# Patient Record
Sex: MALE | Race: WHITE | HISPANIC OR LATINO | Employment: OTHER | URBAN - METROPOLITAN AREA
[De-identification: names, ages, dates, MRNs, and addresses within clinical notes are randomized per-mention and may not be internally consistent; named-entity substitution may affect disease eponyms.]

---

## 2020-09-17 ENCOUNTER — HOSPITAL ENCOUNTER (INPATIENT)
Facility: HOSPITAL | Age: 55
LOS: 1 days | Discharge: HOME | DRG: 392 | End: 2020-09-19
Attending: EMERGENCY MEDICINE | Admitting: INTERNAL MEDICINE
Payer: MEDICARE

## 2020-09-17 ENCOUNTER — APPOINTMENT (EMERGENCY)
Dept: CT IMAGING | Facility: HOSPITAL | Age: 55
DRG: 392 | End: 2020-09-17
Payer: MEDICARE

## 2020-09-17 DIAGNOSIS — R10.9 ABDOMINAL PAIN: ICD-10-CM

## 2020-09-17 DIAGNOSIS — E86.0 DEHYDRATION: ICD-10-CM

## 2020-09-17 DIAGNOSIS — F12.90 MARIJUANA USE: ICD-10-CM

## 2020-09-17 DIAGNOSIS — R11.2 INTRACTABLE NAUSEA AND VOMITING: Primary | ICD-10-CM

## 2020-09-17 DIAGNOSIS — R10.13 EPIGASTRIC PAIN: ICD-10-CM

## 2020-09-17 DIAGNOSIS — R77.8 ELEVATED TROPONIN: ICD-10-CM

## 2020-09-17 LAB
ALBUMIN SERPL BCP-MCNC: 4.3 G/DL (ref 3.5–5)
ALP SERPL-CCNC: 68 U/L (ref 46–116)
ALT SERPL W P-5'-P-CCNC: 21 U/L (ref 12–78)
ANION GAP SERPL CALCULATED.3IONS-SCNC: 15 MMOL/L (ref 4–13)
APTT PPP: 27 SECONDS (ref 23–37)
AST SERPL W P-5'-P-CCNC: 25 U/L (ref 5–45)
BASE EX.OXY STD BLDV CALC-SCNC: 95.2 % (ref 60–80)
BASE EXCESS BLDV CALC-SCNC: 4.5 MMOL/L
BASOPHILS # BLD AUTO: 0.02 THOUSANDS/ΜL (ref 0–0.1)
BASOPHILS NFR BLD AUTO: 0 % (ref 0–1)
BETA-HYDROXYBUTYRATE: 1.6 MMOL/L
BILIRUB SERPL-MCNC: 0.52 MG/DL (ref 0.2–1)
BUN SERPL-MCNC: 10 MG/DL (ref 5–25)
CALCIUM SERPL-MCNC: 9.4 MG/DL (ref 8.3–10.1)
CHLORIDE SERPL-SCNC: 100 MMOL/L (ref 100–108)
CO2 SERPL-SCNC: 24 MMOL/L (ref 21–32)
CREAT SERPL-MCNC: 0.94 MG/DL (ref 0.6–1.3)
EOSINOPHIL # BLD AUTO: 0.01 THOUSAND/ΜL (ref 0–0.61)
EOSINOPHIL NFR BLD AUTO: 0 % (ref 0–6)
ERYTHROCYTE [DISTWIDTH] IN BLOOD BY AUTOMATED COUNT: 12.2 % (ref 11.6–15.1)
GFR SERPL CREATININE-BSD FRML MDRD: 92 ML/MIN/1.73SQ M
GLUCOSE SERPL-MCNC: 128 MG/DL (ref 65–140)
GLUCOSE SERPL-MCNC: 141 MG/DL (ref 65–140)
HCO3 BLDV-SCNC: 25.4 MMOL/L (ref 24–30)
HCT VFR BLD AUTO: 42.9 % (ref 36.5–49.3)
HGB BLD-MCNC: 14.7 G/DL (ref 12–17)
IMM GRANULOCYTES # BLD AUTO: 0.02 THOUSAND/UL (ref 0–0.2)
IMM GRANULOCYTES NFR BLD AUTO: 0 % (ref 0–2)
INR PPP: 1.05 (ref 0.84–1.19)
LACTATE SERPL-SCNC: 1.9 MMOL/L (ref 0.5–2)
LIPASE SERPL-CCNC: 74 U/L (ref 73–393)
LYMPHOCYTES # BLD AUTO: 0.87 THOUSANDS/ΜL (ref 0.6–4.47)
LYMPHOCYTES NFR BLD AUTO: 11 % (ref 14–44)
MAGNESIUM SERPL-MCNC: 1.6 MG/DL (ref 1.6–2.6)
MCH RBC QN AUTO: 31.7 PG (ref 26.8–34.3)
MCHC RBC AUTO-ENTMCNC: 34.3 G/DL (ref 31.4–37.4)
MCV RBC AUTO: 93 FL (ref 82–98)
MONOCYTES # BLD AUTO: 0.44 THOUSAND/ΜL (ref 0.17–1.22)
MONOCYTES NFR BLD AUTO: 6 % (ref 4–12)
NEUTROPHILS # BLD AUTO: 6.55 THOUSANDS/ΜL (ref 1.85–7.62)
NEUTS SEG NFR BLD AUTO: 83 % (ref 43–75)
NRBC BLD AUTO-RTO: 0 /100 WBCS
NT-PROBNP SERPL-MCNC: 743 PG/ML
O2 CT BLDV-SCNC: 20.8 ML/DL
PCO2 BLDV: 28.2 MM HG (ref 42–50)
PH BLDV: 7.57 [PH] (ref 7.3–7.4)
PLATELET # BLD AUTO: 225 THOUSANDS/UL (ref 149–390)
PMV BLD AUTO: 10 FL (ref 8.9–12.7)
PO2 BLDV: 77.3 MM HG (ref 35–45)
POTASSIUM SERPL-SCNC: 3.5 MMOL/L (ref 3.5–5.3)
PROT SERPL-MCNC: 8.4 G/DL (ref 6.4–8.2)
PROTHROMBIN TIME: 13.8 SECONDS (ref 11.6–14.5)
RBC # BLD AUTO: 4.63 MILLION/UL (ref 3.88–5.62)
SODIUM SERPL-SCNC: 139 MMOL/L (ref 136–145)
TROPONIN I SERPL-MCNC: 0.05 NG/ML
WBC # BLD AUTO: 7.91 THOUSAND/UL (ref 4.31–10.16)

## 2020-09-17 PROCEDURE — 87040 BLOOD CULTURE FOR BACTERIA: CPT | Performed by: PHYSICIAN ASSISTANT

## 2020-09-17 PROCEDURE — G1004 CDSM NDSC: HCPCS

## 2020-09-17 PROCEDURE — 85610 PROTHROMBIN TIME: CPT | Performed by: PHYSICIAN ASSISTANT

## 2020-09-17 PROCEDURE — 96372 THER/PROPH/DIAG INJ SC/IM: CPT

## 2020-09-17 PROCEDURE — 83880 ASSAY OF NATRIURETIC PEPTIDE: CPT | Performed by: PHYSICIAN ASSISTANT

## 2020-09-17 PROCEDURE — 99285 EMERGENCY DEPT VISIT HI MDM: CPT

## 2020-09-17 PROCEDURE — 71275 CT ANGIOGRAPHY CHEST: CPT

## 2020-09-17 PROCEDURE — 74177 CT ABD & PELVIS W/CONTRAST: CPT

## 2020-09-17 PROCEDURE — 93005 ELECTROCARDIOGRAM TRACING: CPT

## 2020-09-17 PROCEDURE — 83735 ASSAY OF MAGNESIUM: CPT | Performed by: PHYSICIAN ASSISTANT

## 2020-09-17 PROCEDURE — 83605 ASSAY OF LACTIC ACID: CPT | Performed by: PHYSICIAN ASSISTANT

## 2020-09-17 PROCEDURE — 82948 REAGENT STRIP/BLOOD GLUCOSE: CPT

## 2020-09-17 PROCEDURE — 82010 KETONE BODYS QUAN: CPT | Performed by: PHYSICIAN ASSISTANT

## 2020-09-17 PROCEDURE — 82805 BLOOD GASES W/O2 SATURATION: CPT | Performed by: PHYSICIAN ASSISTANT

## 2020-09-17 PROCEDURE — 96361 HYDRATE IV INFUSION ADD-ON: CPT

## 2020-09-17 PROCEDURE — 96374 THER/PROPH/DIAG INJ IV PUSH: CPT

## 2020-09-17 PROCEDURE — 36415 COLL VENOUS BLD VENIPUNCTURE: CPT | Performed by: PHYSICIAN ASSISTANT

## 2020-09-17 PROCEDURE — 84484 ASSAY OF TROPONIN QUANT: CPT | Performed by: PHYSICIAN ASSISTANT

## 2020-09-17 PROCEDURE — 80053 COMPREHEN METABOLIC PANEL: CPT | Performed by: PHYSICIAN ASSISTANT

## 2020-09-17 PROCEDURE — 85730 THROMBOPLASTIN TIME PARTIAL: CPT | Performed by: PHYSICIAN ASSISTANT

## 2020-09-17 PROCEDURE — 85025 COMPLETE CBC W/AUTO DIFF WBC: CPT | Performed by: PHYSICIAN ASSISTANT

## 2020-09-17 PROCEDURE — C9113 INJ PANTOPRAZOLE SODIUM, VIA: HCPCS | Performed by: PHYSICIAN ASSISTANT

## 2020-09-17 PROCEDURE — 87147 CULTURE TYPE IMMUNOLOGIC: CPT | Performed by: PHYSICIAN ASSISTANT

## 2020-09-17 PROCEDURE — 96375 TX/PRO/DX INJ NEW DRUG ADDON: CPT

## 2020-09-17 PROCEDURE — 83690 ASSAY OF LIPASE: CPT | Performed by: PHYSICIAN ASSISTANT

## 2020-09-17 RX ORDER — PANTOPRAZOLE SODIUM 40 MG/1
40 INJECTION, POWDER, FOR SOLUTION INTRAVENOUS ONCE
Status: COMPLETED | OUTPATIENT
Start: 2020-09-17 | End: 2020-09-17

## 2020-09-17 RX ORDER — OLANZAPINE 10 MG/1
3.5 INJECTION, POWDER, LYOPHILIZED, FOR SOLUTION INTRAMUSCULAR ONCE
Status: COMPLETED | OUTPATIENT
Start: 2020-09-18 | End: 2020-09-17

## 2020-09-17 RX ORDER — ONDANSETRON 2 MG/ML
4 INJECTION INTRAMUSCULAR; INTRAVENOUS ONCE
Status: COMPLETED | OUTPATIENT
Start: 2020-09-17 | End: 2020-09-17

## 2020-09-17 RX ORDER — OLANZAPINE 10 MG/1
2.5 INJECTION, POWDER, LYOPHILIZED, FOR SOLUTION INTRAMUSCULAR ONCE
Status: DISCONTINUED | OUTPATIENT
Start: 2020-09-17 | End: 2020-09-17

## 2020-09-17 RX ORDER — SUCRALFATE 1 G/1
1 TABLET ORAL ONCE
Status: COMPLETED | OUTPATIENT
Start: 2020-09-17 | End: 2020-09-17

## 2020-09-17 RX ORDER — MORPHINE SULFATE 4 MG/ML
4 INJECTION, SOLUTION INTRAMUSCULAR; INTRAVENOUS ONCE
Status: COMPLETED | OUTPATIENT
Start: 2020-09-17 | End: 2020-09-17

## 2020-09-17 RX ADMIN — WATER 2.1 ML: 1 INJECTION INTRAMUSCULAR; INTRAVENOUS; SUBCUTANEOUS at 23:48

## 2020-09-17 RX ADMIN — PANTOPRAZOLE SODIUM 40 MG: 40 INJECTION, POWDER, FOR SOLUTION INTRAVENOUS at 21:29

## 2020-09-17 RX ADMIN — MORPHINE SULFATE 4 MG: 4 INJECTION INTRAVENOUS at 22:41

## 2020-09-17 RX ADMIN — OLANZAPINE 3.5 MG: 10 INJECTION, POWDER, FOR SOLUTION INTRAMUSCULAR at 23:48

## 2020-09-17 RX ADMIN — ONDANSETRON 4 MG: 2 INJECTION INTRAMUSCULAR; INTRAVENOUS at 21:29

## 2020-09-17 RX ADMIN — IOHEXOL 100 ML: 350 INJECTION, SOLUTION INTRAVENOUS at 23:23

## 2020-09-17 RX ADMIN — SODIUM CHLORIDE 1000 ML: 0.9 INJECTION, SOLUTION INTRAVENOUS at 21:27

## 2020-09-17 RX ADMIN — SUCRALFATE 1 G: 1 TABLET ORAL at 22:13

## 2020-09-17 RX ADMIN — SODIUM CHLORIDE 1000 ML: 0.9 INJECTION, SOLUTION INTRAVENOUS at 22:15

## 2020-09-18 PROBLEM — E10.9 TYPE 1 DIABETES MELLITUS (HCC): Status: ACTIVE | Noted: 2020-09-18

## 2020-09-18 PROBLEM — R10.13 EPIGASTRIC PAIN: Status: ACTIVE | Noted: 2020-09-18

## 2020-09-18 PROBLEM — R11.2 INTRACTABLE NAUSEA AND VOMITING: Status: ACTIVE | Noted: 2020-09-18

## 2020-09-18 LAB
ANION GAP SERPL CALCULATED.3IONS-SCNC: 11 MMOL/L (ref 4–13)
ATRIAL RATE: 50 BPM
BACTERIA UR QL AUTO: ABNORMAL /HPF
BILIRUB UR QL STRIP: NEGATIVE
BUN SERPL-MCNC: 7 MG/DL (ref 5–25)
CALCIUM SERPL-MCNC: 8.1 MG/DL (ref 8.3–10.1)
CHLORIDE SERPL-SCNC: 101 MMOL/L (ref 100–108)
CLARITY UR: CLEAR
CO2 SERPL-SCNC: 24 MMOL/L (ref 21–32)
COLOR UR: ABNORMAL
CREAT SERPL-MCNC: 0.8 MG/DL (ref 0.6–1.3)
ERYTHROCYTE [DISTWIDTH] IN BLOOD BY AUTOMATED COUNT: 12.1 % (ref 11.6–15.1)
GFR SERPL CREATININE-BSD FRML MDRD: 102 ML/MIN/1.73SQ M
GLUCOSE P FAST SERPL-MCNC: 150 MG/DL (ref 65–99)
GLUCOSE SERPL-MCNC: 127 MG/DL (ref 65–140)
GLUCOSE SERPL-MCNC: 150 MG/DL (ref 65–140)
GLUCOSE SERPL-MCNC: 185 MG/DL (ref 65–140)
GLUCOSE SERPL-MCNC: 206 MG/DL (ref 65–140)
GLUCOSE SERPL-MCNC: 93 MG/DL (ref 65–140)
GLUCOSE UR STRIP-MCNC: NEGATIVE MG/DL
HCT VFR BLD AUTO: 39.8 % (ref 36.5–49.3)
HGB BLD-MCNC: 13.3 G/DL (ref 12–17)
HGB UR QL STRIP.AUTO: ABNORMAL
KETONES UR STRIP-MCNC: ABNORMAL MG/DL
LEUKOCYTE ESTERASE UR QL STRIP: NEGATIVE
MCH RBC QN AUTO: 31.8 PG (ref 26.8–34.3)
MCHC RBC AUTO-ENTMCNC: 33.4 G/DL (ref 31.4–37.4)
MCV RBC AUTO: 95 FL (ref 82–98)
NITRITE UR QL STRIP: NEGATIVE
NON-SQ EPI CELLS URNS QL MICRO: ABNORMAL /HPF
P AXIS: 28 DEGREES
PH UR STRIP.AUTO: 7.5 [PH]
PLATELET # BLD AUTO: 183 THOUSANDS/UL (ref 149–390)
PMV BLD AUTO: 10.1 FL (ref 8.9–12.7)
POTASSIUM SERPL-SCNC: 3.9 MMOL/L (ref 3.5–5.3)
PR INTERVAL: 144 MS
PROT UR STRIP-MCNC: NEGATIVE MG/DL
QRS AXIS: -60 DEGREES
QRSD INTERVAL: 104 MS
QT INTERVAL: 460 MS
QTC INTERVAL: 419 MS
RBC # BLD AUTO: 4.18 MILLION/UL (ref 3.88–5.62)
RBC #/AREA URNS AUTO: ABNORMAL /HPF
SODIUM SERPL-SCNC: 136 MMOL/L (ref 136–145)
SP GR UR STRIP.AUTO: 1.01 (ref 1–1.03)
T WAVE AXIS: -28 DEGREES
TROPONIN I SERPL-MCNC: 0.04 NG/ML
TROPONIN I SERPL-MCNC: 0.04 NG/ML
UROBILINOGEN UR QL STRIP.AUTO: 0.2 E.U./DL
VENTRICULAR RATE: 50 BPM
WBC # BLD AUTO: 7.12 THOUSAND/UL (ref 4.31–10.16)
WBC #/AREA URNS AUTO: ABNORMAL /HPF

## 2020-09-18 PROCEDURE — 84484 ASSAY OF TROPONIN QUANT: CPT | Performed by: NURSE PRACTITIONER

## 2020-09-18 PROCEDURE — 84484 ASSAY OF TROPONIN QUANT: CPT | Performed by: PHYSICIAN ASSISTANT

## 2020-09-18 PROCEDURE — 93010 ELECTROCARDIOGRAM REPORT: CPT | Performed by: INTERNAL MEDICINE

## 2020-09-18 PROCEDURE — 85027 COMPLETE CBC AUTOMATED: CPT | Performed by: NURSE PRACTITIONER

## 2020-09-18 PROCEDURE — 81001 URINALYSIS AUTO W/SCOPE: CPT | Performed by: PHYSICIAN ASSISTANT

## 2020-09-18 PROCEDURE — 80048 BASIC METABOLIC PNL TOTAL CA: CPT | Performed by: NURSE PRACTITIONER

## 2020-09-18 PROCEDURE — 36415 COLL VENOUS BLD VENIPUNCTURE: CPT | Performed by: PHYSICIAN ASSISTANT

## 2020-09-18 PROCEDURE — 82948 REAGENT STRIP/BLOOD GLUCOSE: CPT

## 2020-09-18 PROCEDURE — 99219 PR INITIAL OBSERVATION CARE/DAY 50 MINUTES: CPT | Performed by: INTERNAL MEDICINE

## 2020-09-18 RX ORDER — SUCRALFATE 1 G/1
1 TABLET ORAL
Status: DISCONTINUED | OUTPATIENT
Start: 2020-09-18 | End: 2020-09-19 | Stop reason: HOSPADM

## 2020-09-18 RX ORDER — ONDANSETRON 2 MG/ML
4 INJECTION INTRAMUSCULAR; INTRAVENOUS EVERY 6 HOURS PRN
Status: DISCONTINUED | OUTPATIENT
Start: 2020-09-18 | End: 2020-09-19 | Stop reason: HOSPADM

## 2020-09-18 RX ORDER — MAGNESIUM HYDROXIDE/ALUMINUM HYDROXICE/SIMETHICONE 120; 1200; 1200 MG/30ML; MG/30ML; MG/30ML
30 SUSPENSION ORAL EVERY 6 HOURS PRN
Status: DISCONTINUED | OUTPATIENT
Start: 2020-09-18 | End: 2020-09-19 | Stop reason: HOSPADM

## 2020-09-18 RX ORDER — LIDOCAINE HYDROCHLORIDE 20 MG/ML
15 SOLUTION OROPHARYNGEAL ONCE
Status: COMPLETED | OUTPATIENT
Start: 2020-09-18 | End: 2020-09-18

## 2020-09-18 RX ORDER — SODIUM CHLORIDE 9 MG/ML
125 INJECTION, SOLUTION INTRAVENOUS CONTINUOUS
Status: DISCONTINUED | OUTPATIENT
Start: 2020-09-18 | End: 2020-09-19 | Stop reason: HOSPADM

## 2020-09-18 RX ORDER — SUCRALFATE ORAL 1 G/10ML
1000 SUSPENSION ORAL EVERY 6 HOURS SCHEDULED
Status: DISCONTINUED | OUTPATIENT
Start: 2020-09-18 | End: 2020-09-18 | Stop reason: CLARIF

## 2020-09-18 RX ORDER — MAGNESIUM HYDROXIDE/ALUMINUM HYDROXICE/SIMETHICONE 120; 1200; 1200 MG/30ML; MG/30ML; MG/30ML
30 SUSPENSION ORAL ONCE
Status: COMPLETED | OUTPATIENT
Start: 2020-09-18 | End: 2020-09-18

## 2020-09-18 RX ADMIN — SUCRALFATE 1 G: 1 TABLET ORAL at 21:31

## 2020-09-18 RX ADMIN — SODIUM CHLORIDE 125 ML/HR: 0.9 INJECTION, SOLUTION INTRAVENOUS at 03:01

## 2020-09-18 RX ADMIN — SODIUM CHLORIDE 125 ML/HR: 0.9 INJECTION, SOLUTION INTRAVENOUS at 11:11

## 2020-09-18 RX ADMIN — SUCRALFATE 1 G: 1 TABLET ORAL at 06:19

## 2020-09-18 RX ADMIN — SODIUM CHLORIDE 125 ML/HR: 0.9 INJECTION, SOLUTION INTRAVENOUS at 18:45

## 2020-09-18 RX ADMIN — SUCRALFATE 1 G: 1 TABLET ORAL at 17:10

## 2020-09-18 RX ADMIN — ALUMINUM HYDROXIDE, MAGNESIUM HYDROXIDE, AND SIMETHICONE 30 ML: 200; 200; 20 SUSPENSION ORAL at 00:26

## 2020-09-18 RX ADMIN — LIDOCAINE HYDROCHLORIDE 15 ML: 20 SOLUTION ORAL; TOPICAL at 00:26

## 2020-09-18 RX ADMIN — SUCRALFATE 1 G: 1 TABLET ORAL at 11:08

## 2020-09-18 NOTE — ASSESSMENT & PLAN NOTE
· Presentation: Patient reports intractable nausea and vomiting began around 1800 last night  Patient denies sick contacts or food intake of possible cause  · Suspect in the setting of marijuana use  · IV hydration  · Antiemetics  · Pain control  · Monitor electrolytes and replace

## 2020-09-18 NOTE — PLAN OF CARE
Problem: Potential for Falls  Goal: Patient will remain free of falls  Description: INTERVENTIONS:  - Assess patient frequently for physical needs  -  Identify cognitive and physical deficits and behaviors that affect risk of falls    -  [place] fall precautions as indicated by assessment   - Educate patient/family on patient safety including physical limitations  - Instruct patient to call for assistance with activity based on assessment  - Modify environment to reduce risk of injury  - Consider OT/PT consult to assist with strengthening/mobility  Outcome: Progressing

## 2020-09-18 NOTE — PLAN OF CARE
Problem: Potential for Falls  Goal: Patient will remain free of falls  Description: INTERVENTIONS:  - Assess patient frequently for physical needs  -  Identify cognitive and physical deficits and behaviors that affect risk of falls    -  [place] fall precautions as indicated by assessment   - Educate patient/family on patient safety including physical limitations  - Instruct patient to call for assistance with activity based on assessment  - Modify environment to reduce risk of injury  - Consider OT/PT consult to assist with strengthening/mobility  Outcome: Progressing

## 2020-09-18 NOTE — ASSESSMENT & PLAN NOTE
Lab Results   Component Value Date    HGBA1C 8 4 (H) 09/14/2020       Recent Labs     09/17/20 2105   POCGLU 128       Blood Sugar Average: Last 72 hrs:  (P) 128   · Patient has insulin pump on person  · Have patient fill out initial assessment of insulin pump form  · Hypoglycemia protocol  · QID accu-checks

## 2020-09-18 NOTE — H&P
[facility]    H&P- José Torres 1965, 50 y o  male MRN: [id number]    Unit/Bed#: S -01 Encounter: [id number]    Primary Care Provider: No primary care provider on file  Date and time admitted to hospital: 9/17/2020  8:37 PM        * Intractable nausea and vomiting  Assessment & Plan  · Presentation: Patient reports intractable nausea and vomiting began around 1800 last night  Patient denies sick contacts or food intake of possible cause  · Suspect in the setting of marijuana use  · IV hydration  · Antiemetics  · Pain control  · Monitor electrolytes and replace  Epigastric pain  Assessment & Plan  · ABG non acidotic  Not in DKA  · CT of abdomen pelvis:  "No evidence of pulmonary embolism  No significant lymphadenopathy within the chest, abdomen or pelvis  Mild urinary bladder wall thickening which is likely due to the underdistention however correlate with urinalysis    · Pain control  Type 1 diabetes mellitus [facility])  Assessment & Plan  Lab Results   Component Value Date    HGBA1C 8 4 (H) 09/14/2020       Recent Labs     09/17/20  2105   POCGLU 128       Blood Sugar Average: Last 72 hrs:  (P) 128   · Patient has insulin pump on person  · Have patient fill out initial assessment of insulin pump form  · Hypoglycemia protocol  · QID accu-checks  VTE Prophylaxis: low risk  / reason for no mechanical VTE prophylaxis low risk   Code Status: Full  POLST: POLST form is not discussed and not completed at this time  Anticipated Length of Stay:  Patient will be admitted on an Observation basis with an anticipated length of stay of  < 2 midnights  Justification for Hospital Stay: Pain control, antiemetics, IV hydration    Total Time for Visit, including Counseling / Coordination of Care: 45 minutes  Greater than 50% of this total time spent on direct patient counseling and coordination of care  Chief Complaint:   Nausea, vomiting, and epigastric pain      History of Present Illness:    Donna Rg is a 50 y o  male with a past medical history of non Hodgkin's lymphoma and type 1 diabetes on a insulin pump who presents with nausea, vomiting and epigastric pain  Patient reports intractable nausea and vomiting began around 1800 last night  Patient denies sick contacts or food intake as possible cause  The patient was unable to tolerate oral intake in the ER  Patient will be admitted for IV hydration, antiemetics, and pain control  Review of Systems:    Review of Systems   Constitutional: Positive for chills  Negative for fever  Respiratory: Negative for cough and shortness of breath  Cardiovascular: Negative for chest pain  Gastrointestinal: Positive for abdominal pain, nausea and vomiting  Neurological: Negative for dizziness, light-headedness and numbness  All other systems reviewed and are negative  Past Medical and Surgical History:     History reviewed  No pertinent past medical history  History reviewed  No pertinent surgical history  Meds/Allergies:    Prior to Admission medications    Not on File     I have reviewed home medications with patient personally  Allergies:    Allergies   Allergen Reactions    Shellfish-Derived Products Hives       Social History:     Marital Status:    Occupation:  Unknown  Patient Pre-hospital Living Situation:  Private residence  Patient Pre-hospital Level of Mobility:  Independent  Patient Pre-hospital Diet Restrictions:  1st diabetic  Substance Use History:   Social History     Substance and Sexual Activity   Alcohol Use None     Social History     Tobacco Use   Smoking Status Never Smoker   Smokeless Tobacco Never Used     Social History     Substance and Sexual Activity   Drug Use Yes    Types: Marijuana    Comment: last use 2 days ago       Family History:    non-contributory    Physical Exam:     Vitals:   Blood Pressure: 159/68 (09/18/20 0110)  Pulse: (!) 49 (09/18/20 0110)  Temperature: 98 3 °F (36 8 °C) (09/18/20 0110)  Temp Source: Oral (09/18/20 0110)  Respirations: 16 (09/18/20 0110)  Height: 5' 6" (167 6 cm) (09/18/20 0030)  Weight - Scale: 72 6 kg (160 lb 0 9 oz) (09/18/20 0030)  SpO2: 95 % (09/18/20 0110)    Physical Exam  Vitals signs and nursing note reviewed  Constitutional:       General: He is in acute distress  Appearance: Normal appearance  HENT:      Head: Normocephalic and atraumatic  Eyes:      General: No scleral icterus  Conjunctiva/sclera: Conjunctivae normal    Cardiovascular:      Rate and Rhythm: Normal rate and regular rhythm  Pulses: Normal pulses  Heart sounds: Normal heart sounds  No murmur  Pulmonary:      Effort: Pulmonary effort is normal  No respiratory distress  Breath sounds: Normal breath sounds  No wheezing, rhonchi or rales  Abdominal:      General: Bowel sounds are normal  There is no distension  Tenderness: There is abdominal tenderness in the epigastric area  Musculoskeletal:         General: No swelling or deformity  Skin:     General: Skin is dry  Capillary Refill: Capillary refill takes 2 to 3 seconds  Neurological:      Mental Status: He is alert and oriented to person, place, and time  Additional Data:     Lab Results: I have personally reviewed pertinent reports        Results from last 7 days   Lab Units 09/17/20 2109   WBC Thousand/uL 7 91   HEMOGLOBIN g/dL 14 7   HEMATOCRIT % 42 9   PLATELETS Thousands/uL 225   NEUTROS PCT % 83*   LYMPHS PCT % 11*   MONOS PCT % 6   EOS PCT % 0     Results from last 7 days   Lab Units 09/17/20 2109   SODIUM mmol/L 139   POTASSIUM mmol/L 3 5   CHLORIDE mmol/L 100   CO2 mmol/L 24   BUN mg/dL 10   CREATININE mg/dL 0 94   ANION GAP mmol/L 15*   CALCIUM mg/dL 9 4   ALBUMIN g/dL 4 3   TOTAL BILIRUBIN mg/dL 0 52   ALK PHOS U/L 68   ALT U/L 21   AST U/L 25   GLUCOSE RANDOM mg/dL 141*     Results from last 7 days   Lab Units 09/17/20 2108   INR 1 05     Results from last 7 days   Lab Units 09/17/20  2105   POC GLUCOSE mg/dl 128     Results from last 7 days   Lab Units 09/14/20  1248   HEMOGLOBIN A1C % 8 4*     Results from last 7 days   Lab Units 09/17/20  2108   LACTIC ACID mmol/L 1 9       Imaging: I have personally reviewed pertinent reports  CT pe study w abdomen pelvis w contrast   Final Result by Cierra Chirinos MD (09/17 2328)      1  No evidence of pulmonary embolism  2   No significant lymphadenopathy within the chest, abdomen, or pelvis  3   Mild urinary bladder wall thickening which is likely due to underdistention however correlate with urinalysis  Workstation performed: [id number]             EKG, Pathology, and Other Studies Reviewed on Admission:   · EKG: None    Allscripts / Epic Records Reviewed: Yes     ** Please Note: This note has been constructed using a voice recognition system   **

## 2020-09-18 NOTE — UTILIZATION REVIEW
Initial Clinical Review    Admitted OBS status on  9/18  @  0022      CHANGED to INPT status on   9/18  @  1657  Due to need for continued hospital services     Inpatient Admission  Once      Transfer Service: Hospitalist       Question  Answer    Admitting Physician  YESI Javier    Level of Care  Med Surg    Estimated length of stay  More than 2 Midnights    Certification  I certify that inpatient services are medically necessary for this patient for a duration of greater than two midnights  See H&P and MD Progress Notes for additional information about the patient's course of treatment  ED Arrival Information     Expected Arrival Acuity Means of Arrival Escorted By Service Admission Type    - 9/17/2020 20:29 Urgent Walk-In Self Hospitalist Urgent    Arrival Complaint    VOMITING 2XDAY,WEAKNESS        Chief Complaint   Patient presents with    Epigastric Pain     Pt reports epigastric pain that shoots to his back and is burning  Reports Nausea and vomiting x2days   Vomiting     Assessment/Plan:       50 y o  male with a past medical history of non Hodgkin's lymphoma and type 1 diabetes on a insulin pump who presents with nausea, vomiting and epigastric pain  Patient reports intractable nausea and vomiting began around 1800 last night  Patient denies sick contacts or food intake as possible cause  The patient was unable to tolerate oral intake in the ER  Patient will be admitted for IV hydration, antiemetics, and pain control  * Intractable nausea and vomiting  Assessment & Plan  · Presentation: Patient reports intractable nausea and vomiting began around 1800 last night  Patient denies sick contacts or food intake of possible cause  · Suspect in the setting of marijuana use  · IV hydration  · Antiemetics  · Pain control  · Monitor electrolytes and replace  Epigastric pain  Assessment & Plan  · ABG non acidotic  Not in DKA    · CT of abdomen pelvis:  "No evidence of pulmonary embolism  No significant lymphadenopathy within the chest, abdomen or pelvis  Mild urinary bladder wall thickening which is likely due to the underdistention however correlate with urinalysis    · Pain control    Type 1 diabetes mellitus [facility])  Assessment & Plan        Lab Results   Component Value Date     HGBA1C 8 4 (H) 09/14/2020         Recent Labs     09/17/20  2105   POCGLU 128         Blood Sugar Average: Last 72 hrs:  (P) 128   · Patient has insulin pump on person  · Have patient fill out initial assessment of insulin pump form  · Hypoglycemia protocol  · QID accu-checks     Justification for Hospital Stay: Pain control, antiemetics, IV hydration          9/18  @  1635   IVF cont @  125 cc/hr - minimal po intake         ED Triage Vitals   Temperature Pulse Respirations Blood Pressure SpO2   09/17/20 2045 09/17/20 2045 09/17/20 2045 09/17/20 2045 09/17/20 2045   98 1 °F (36 7 °C) (!) 49 18 (!) 171/74 100 %      Temp Source Heart Rate Source Patient Position - Orthostatic VS BP Location FiO2 (%)   09/17/20 2045 09/17/20 2045 09/17/20 2242 09/17/20 2045 --   Oral Monitor Lying Right arm       Pain Score       09/17/20 2241       Worst Possible Pain          Wt Readings from Last 1 Encounters:   09/18/20 72 6 kg (160 lb 0 9 oz)     Additional Vital Signs:   09/18/20 0030   98 5 °F (36 9 °C)   54Abnormal     18   119/62      99 %   None (Room air)   Sitting    09/17/20 2242      53Abnormal     18   122/58      100 %          09/18/20 0657   98 8 °F (37 1 °C)   53Abnormal        111/56   81   96 %           09/16 0701   09/17 0700  09/17 0701   09/18 0700  09/18 0701   09/19 0700    I V  (mL/kg)    1020 8 (14 1)    IV Piggyback   2000     Total Intake(mL/kg)   2000 (27 5)  1020 8 (14 1)    Urine (mL/kg/hr)   250     Total Output   250     Net   +1750  +1020 8          Pertinent Labs/Diagnostic Test Results:   9/17  ekg -  Sinus bradycardia    9/17  ctap -   1   No evidence of pulmonary embolism      2   No significant lymphadenopathy within the chest, abdomen, or pelvis  3   Mild urinary bladder wall thickening which is likely due to underdistention however correlate with urinalysis         Results from last 7 days   Lab Units 09/18/20  0431 09/17/20 2109   WBC Thousand/uL 7 12 7 91   HEMOGLOBIN g/dL 13 3 14 7   HEMATOCRIT % 39 8 42 9   PLATELETS Thousands/uL 183 225   NEUTROS ABS Thousands/µL  --  6 55         Results from last 7 days   Lab Units 09/18/20  0431 09/17/20  2109   SODIUM mmol/L 136 139   POTASSIUM mmol/L 3 9 3 5   CHLORIDE mmol/L 101 100   CO2 mmol/L 24 24   ANION GAP mmol/L 11 15*   BUN mg/dL 7 10   CREATININE mg/dL 0 80 0 94   EGFR ml/min/1 73sq m 102 92   CALCIUM mg/dL 8 1* 9 4   MAGNESIUM mg/dL  --  1 6     Results from last 7 days   Lab Units 09/17/20 2109   AST U/L 25   ALT U/L 21   ALK PHOS U/L 68   TOTAL PROTEIN g/dL 8 4*   ALBUMIN g/dL 4 3   TOTAL BILIRUBIN mg/dL 0 52     Results from last 7 days   Lab Units 09/18/20  0708 09/17/20  2105   POC GLUCOSE mg/dl 127 128     Results from last 7 days   Lab Units 09/18/20 0431 09/17/20 2109   GLUCOSE RANDOM mg/dL 150* 141*         Results from last 7 days   Lab Units 09/14/20  1248   HEMOGLOBIN A1C % 8 4*   EAG mg/dL 194*     BETA-HYDROXYBUTYRATE   Date Value Ref Range Status   09/17/2020 1 6 (H) <0 6 mmol/L Final          Results from last 7 days   Lab Units 09/17/20 2108   PH LYRIC  7 573*   PCO2 LYRIC mm Hg 28 2*   PO2 LYRIC mm Hg 77 3*   HCO3 LYRIC mmol/L 25 4   BASE EXC LYRIC mmol/L 4 5   O2 CONTENT LYRIC ml/dL 20 8   O2 HGB, VENOUS % 95 2*             Results from last 7 days   Lab Units 09/18/20  0431 09/18/20  0035 09/17/20 2132   TROPONIN I ng/mL 0 04 0 04 0 05*         Results from last 7 days   Lab Units 09/17/20 2108   PROTIME seconds 13 8   INR  1 05   PTT seconds 27             Results from last 7 days   Lab Units 09/17/20  2108   LACTIC ACID mmol/L 1 9             Results from last 7 days   Lab Units 09/17/20  2109   NT-PRO BNP pg/mL 743*             Results from last 7 days   Lab Units 09/17/20  2109   LIPASE u/L 74             Results from last 7 days   Lab Units 09/18/20  0019   CLARITY UA  Clear   COLOR UA  Light Yellow   SPEC GRAV UA  1 010   PH UA  7 5   GLUCOSE UA mg/dl Negative   KETONES UA mg/dl 15 (1+)*   BLOOD UA  Trace-Intact*   PROTEIN UA mg/dl Negative   NITRITE UA  Negative   BILIRUBIN UA  Negative   UROBILINOGEN UA E U /dl 0 2   LEUKOCYTES UA  Negative   WBC UA /hpf None Seen   RBC UA /hpf 0-1*   BACTERIA UA /hpf None Seen   EPITHELIAL CELLS WET PREP /hpf None Seen     Results from last 7 days   Lab Units 09/17/20  2209 09/17/20 2132   BLOOD CULTURE  Received in Microbiology Lab  Culture in Progress  Received in Microbiology Lab  Culture in Progress       ED Treatment:   Medication Administration from 09/17/2020 2029 to 09/18/2020 0109       Date/Time Order Dose Route Action     09/17/2020 2129 ondansetron (ZOFRAN) injection 4 mg 4 mg Intravenous Given     09/17/2020 2127 sodium chloride 0 9 % bolus 1,000 mL 1,000 mL Intravenous New Bag     09/17/2020 2129 pantoprazole (PROTONIX) injection 40 mg 40 mg Intravenous Given     09/17/2020 2213 sucralfate (CARAFATE) tablet 1 g 1 g Oral Given     09/17/2020 2215 sodium chloride 0 9 % bolus 1,000 mL 1,000 mL Intravenous New Bag     09/17/2020 2241 morphine (PF) 4 mg/mL injection 4 mg 4 mg Intravenous Given     09/17/2020 2348 sterile water injection **ADS Override Pull** 2 1 mL  Given     09/17/2020 2348 OLANZapine (ZyPREXA) IM injection 3 5 mg 3 5 mg Intramuscular Given     09/18/2020 0026 aluminum-magnesium hydroxide-simethicone (MYLANTA) 200-200-20 mg/5 mL oral suspension 30 mL 30 mL Oral Given     09/18/2020 0026 Lidocaine Viscous HCl (XYLOCAINE) 2 % mucosal solution 15 mL 15 mL Swish & Swallow Given        Admitting Diagnosis: Dehydration [E86 0]  Vomiting [R11 10]  Epigastric pain [R10 13]  Abdominal pain [R10 9]  Elevated troponin [R79 89]  Intractable nausea and vomiting [R11 2]  Marijuana use [F12 90]  Age/Sex: 50 y o  male  Admission Orders:  accucks qid w/SSI:  Ambulate q shift;   Clear liquid diet;   IVF    Scheduled Medications:  sucralfate, 1 g, Oral, 4x Daily (AC & HS)      Continuous IV Infusions:  sodium chloride, 125 mL/hr, Intravenous, Continuous      PRN Meds:  aluminum-magnesium hydroxide-simethicone, 30 mL, Oral, Q6H PRN  insulin aspart, 1 mL, Subcutaneous Insulin Pump, Daily PRN  morphine injection, 2 mg, Intravenous, Q3H PRN  ondansetron, 4 mg, Intravenous, Q6H PRN        Network Utilization Review Department  [email or web address]  ATTENTION: Please call with any questions or concerns to [phone] and carefully listen to the prompts so that you are directed to the right person  All voicemails are confidential   Lars Shin all requests for admission clinical reviews, approved or denied determinations and any other requests to dedicated fax number below belonging to the campus where the patient is receiving treatment   List of dedicated fax numbers for the Facilities:  [address] [place] [address] DENIALS (Administrative/Medical Necessity) [phone]   [facility] [address] [place] [address] (Maternity/NICU/Pediatrics) [phone]   [facility] [phone]   [facility] [phone]   Darrius Patricia [phone]   Yoselin Pozo [phone]   [address] [facility] [address] [facility] [phone]   [facility] [phone]   [address] [facility] [address]  [address] [facility] [address] [facility] [phone]

## 2020-09-18 NOTE — ASSESSMENT & PLAN NOTE
· ABG non acidotic  Not in DKA  · CT of abdomen pelvis:  "No evidence of pulmonary embolism  No significant lymphadenopathy within the chest, abdomen or pelvis  Mild urinary bladder wall thickening which is likely due to the underdistention however correlate with urinalysis    · Pain control

## 2020-09-19 VITALS
SYSTOLIC BLOOD PRESSURE: 106 MMHG | DIASTOLIC BLOOD PRESSURE: 60 MMHG | RESPIRATION RATE: 16 BRPM | HEIGHT: 66 IN | BODY MASS INDEX: 25.72 KG/M2 | TEMPERATURE: 98.6 F | WEIGHT: 160.05 LBS | HEART RATE: 63 BPM | OXYGEN SATURATION: 95 %

## 2020-09-19 PROBLEM — R11.2 INTRACTABLE NAUSEA AND VOMITING: Status: RESOLVED | Noted: 2020-09-18 | Resolved: 2020-09-19

## 2020-09-19 PROBLEM — R10.13 EPIGASTRIC PAIN: Status: RESOLVED | Noted: 2020-09-18 | Resolved: 2020-09-19

## 2020-09-19 PROBLEM — F12.90 MARIJUANA USE: Status: ACTIVE | Noted: 2020-09-19

## 2020-09-19 PROBLEM — R78.81 POSITIVE BLOOD CULTURE: Status: ACTIVE | Noted: 2020-09-19

## 2020-09-19 PROBLEM — R10.9 ABDOMINAL PAIN: Status: ACTIVE | Noted: 2020-09-19

## 2020-09-19 LAB
ANION GAP SERPL CALCULATED.3IONS-SCNC: 4 MMOL/L (ref 4–13)
BASOPHILS # BLD AUTO: 0.02 THOUSANDS/ΜL (ref 0–0.1)
BASOPHILS NFR BLD AUTO: 0 % (ref 0–1)
BUN SERPL-MCNC: 9 MG/DL (ref 5–25)
CALCIUM SERPL-MCNC: 7.6 MG/DL (ref 8.3–10.1)
CHLORIDE SERPL-SCNC: 108 MMOL/L (ref 100–108)
CO2 SERPL-SCNC: 27 MMOL/L (ref 21–32)
CREAT SERPL-MCNC: 0.87 MG/DL (ref 0.6–1.3)
EOSINOPHIL # BLD AUTO: 0.04 THOUSAND/ΜL (ref 0–0.61)
EOSINOPHIL NFR BLD AUTO: 1 % (ref 0–6)
ERYTHROCYTE [DISTWIDTH] IN BLOOD BY AUTOMATED COUNT: 12.2 % (ref 11.6–15.1)
GFR SERPL CREATININE-BSD FRML MDRD: 99 ML/MIN/1.73SQ M
GLUCOSE SERPL-MCNC: 131 MG/DL (ref 65–140)
GLUCOSE SERPL-MCNC: 141 MG/DL (ref 65–140)
GLUCOSE SERPL-MCNC: 97 MG/DL (ref 65–140)
HCT VFR BLD AUTO: 37.5 % (ref 36.5–49.3)
HGB BLD-MCNC: 12.4 G/DL (ref 12–17)
IMM GRANULOCYTES # BLD AUTO: 0.01 THOUSAND/UL (ref 0–0.2)
IMM GRANULOCYTES NFR BLD AUTO: 0 % (ref 0–2)
LYMPHOCYTES # BLD AUTO: 2.06 THOUSANDS/ΜL (ref 0.6–4.47)
LYMPHOCYTES NFR BLD AUTO: 35 % (ref 14–44)
MCH RBC QN AUTO: 31.6 PG (ref 26.8–34.3)
MCHC RBC AUTO-ENTMCNC: 33.1 G/DL (ref 31.4–37.4)
MCV RBC AUTO: 96 FL (ref 82–98)
MONOCYTES # BLD AUTO: 0.43 THOUSAND/ΜL (ref 0.17–1.22)
MONOCYTES NFR BLD AUTO: 7 % (ref 4–12)
NEUTROPHILS # BLD AUTO: 3.3 THOUSANDS/ΜL (ref 1.85–7.62)
NEUTS SEG NFR BLD AUTO: 57 % (ref 43–75)
NRBC BLD AUTO-RTO: 0 /100 WBCS
PLATELET # BLD AUTO: 175 THOUSANDS/UL (ref 149–390)
PMV BLD AUTO: 9.9 FL (ref 8.9–12.7)
POTASSIUM SERPL-SCNC: 3.9 MMOL/L (ref 3.5–5.3)
RBC # BLD AUTO: 3.92 MILLION/UL (ref 3.88–5.62)
SODIUM SERPL-SCNC: 139 MMOL/L (ref 136–145)
WBC # BLD AUTO: 5.86 THOUSAND/UL (ref 4.31–10.16)

## 2020-09-19 PROCEDURE — 82948 REAGENT STRIP/BLOOD GLUCOSE: CPT

## 2020-09-19 PROCEDURE — 80048 BASIC METABOLIC PNL TOTAL CA: CPT | Performed by: INTERNAL MEDICINE

## 2020-09-19 PROCEDURE — 99285 EMERGENCY DEPT VISIT HI MDM: CPT | Performed by: PHYSICIAN ASSISTANT

## 2020-09-19 PROCEDURE — 99238 HOSP IP/OBS DSCHRG MGMT 30/<: CPT | Performed by: INTERNAL MEDICINE

## 2020-09-19 PROCEDURE — 85025 COMPLETE CBC W/AUTO DIFF WBC: CPT | Performed by: INTERNAL MEDICINE

## 2020-09-19 RX ORDER — ONDANSETRON 4 MG/1
4 TABLET, ORALLY DISINTEGRATING ORAL EVERY 8 HOURS PRN
Qty: 15 TABLET | Refills: 0 | Status: SHIPPED | OUTPATIENT
Start: 2020-09-19 | End: 2021-05-06 | Stop reason: HOSPADM

## 2020-09-19 RX ORDER — SIMETHICONE 180 MG
180 CAPSULE ORAL EVERY 12 HOURS PRN
Qty: 10 CAPSULE | Refills: 0 | Status: SHIPPED | OUTPATIENT
Start: 2020-09-19 | End: 2020-09-24

## 2020-09-19 RX ORDER — PANTOPRAZOLE SODIUM 20 MG/1
20 TABLET, DELAYED RELEASE ORAL DAILY
Qty: 30 TABLET | Refills: 0 | Status: SHIPPED | OUTPATIENT
Start: 2020-09-19 | End: 2021-05-06 | Stop reason: HOSPADM

## 2020-09-19 RX ADMIN — SUCRALFATE 1 G: 1 TABLET ORAL at 05:25

## 2020-09-19 RX ADMIN — SODIUM CHLORIDE 125 ML/HR: 0.9 INJECTION, SOLUTION INTRAVENOUS at 02:07

## 2020-09-19 NOTE — DISCHARGE SUMMARY
Discharge- You Formosa 1965, 50 y o  male MRN: [id number]    Unit/Bed#: S -01 Encounter: [id number]    Primary Care Provider: No primary care provider on file  Date and time admitted to hospital: 9/17/2020  8:37 PM        * Intractable nausea and vomiting  Assessment & Plan  Improved with IV hydration, antiemetics and marijuana abstinence  He is ok to be discharged home and follow up with a primary care doctor and GI of his preference  Abdominal pain  Assessment & Plan  He did have dyspeptic symptoms with nausea, vomiting and flatulence  They have significantly improved as above  May benefit form follow up but since he is stable this can be done as an outpatient  Marijuana use  Assessment & Plan  This can potentially be contributing to his nausea symptoms of admission  He was oriented to stop  Type 1 diabetes mellitus (HCC)  Assessment & Plan    Stable, patient controls his own insulin pump  No signs of acidosis  Positive blood culture  Assessment & Plan  He did have 1 out of 2 blood cultures collected on 09/17/2020 positive for gram-positive cocci in clusters and Gram-negative rods, the other blood culture however has remained negative to date  Patient is afebrile, nontoxic, there is no white count, there is no heart murmur  The positive culture seems to be a contaminant at this point given his clinical picture  He is aware of the results and that he should monitor himself closely for any signs of sepsis, but still requests discharge  Discharging Physician / Practitioner: Nitin Perea MD  PCP: No primary care provider on file    Admission Date:   Admission Orders (From admission, onward)     Ordered        09/18/20 1657  Inpatient Admission  Once         09/18/20 0022  Place in Observation (expected length of stay for this patient is less than two midnights)  Once                   Discharge Date: 09/19/20    Resolved Problems  Date Reviewed: 9/18/2020 Resolved    Epigastric pain 9/19/2020     Resolved by  Rg Brizuela MD              Significant Findings / Test Results:   · 1 out of 2 blood cultures positive for gram-positive cocci in clusters and Gram-negative rods, the other blood culture however has remained negative to date, however patient has no signs of sepsis, no fever, no murmur, nontoxic, no white count  Most likely a contaminant given the clinical picture  Patient wishes to be discharged regardless of the results  Test Results Pending at Discharge (will require follow up): · Final blood culture results  Reason for Admission: Intractable nausea and vomiting  HPI: Kingsley Morgan is a 50 y o  male with a past medical history of non Hodgkin's lymphoma and type 1 diabetes on a insulin pump who presents with nausea, vomiting and epigastric pain  Patient reports intractable nausea and vomiting began around 1800 last night  Patient denies sick contacts or food intake as possible cause  The patient was unable to tolerate oral intake in the ER  Patient will be admitted for IV hydration, antiemetics, and pain control  Hospital Course:     Patient was treated symptomatically with IV metoclopramide and IV hydration  Marijuana cessation was achieved while the hospital   With that his symptoms improved significantly  He was able to tolerate diet without any significant nausea vomiting  Of note, patient remained afebrile throughout the hospitalization  1 out of 2 blood cultures collected during admission workup came back positive for gram-positive cocci in clusters and Gram-negative rods, the other blood culture however has remained negative to date  Patient is afebrile, nontoxic, there is no white count, there is no heart murmur  Since there was no other sign of active infection no antibiotic treatment was pursued and the culture was deemed a contaminant due to its polymicrobial nature    He is aware of the results and that he should monitor himself closely for any signs of sepsis  On 09/19/2020 in the morning he feels much better, able to tolerate diet  Nontoxic, exam normal  He asked if he could go home  As per request he is being discharged home but oriented to come back to the hospital if any signs of sepsis appear or if he deteriorates  He is supposed to follow up with primary care physician and gastroenterologist as an outpatient  PRN medicines pantoprazole, simethicone and zofran have been provided regarding his initial abdominal discomfort  He is recommended to abstain from marijuana  Cassandra Servin is a 50 y o  male patient who originally presented to the hospital on 9/17/2020 due to nausea and vomiting    The patient, initially admitted to the hospital as inpatient, was discharged earlier than expected given the following: significant clinical improvement       Please see above list of diagnoses and related plan for additional information  Condition at Discharge: good     Discharge Day Visit / Exam:     Subjective:  Patient feels much better, tolerating diet, no nausea, vomiting, diarrhea, constipation  Vitals: Blood Pressure: 106/60 (09/19/20 0727)  Pulse: 63 (09/19/20 0727)  Temperature: 98 6 °F (37 °C) (09/19/20 0727)  Temp Source: Oral (09/19/20 0727)  Respirations: 16 (09/19/20 0727)  Height: 5' 6" (167 6 cm) (09/18/20 0030)  Weight - Scale: 72 6 kg (160 lb 0 9 oz) (09/18/20 0030)  SpO2: 95 % (09/19/20 0727)  Exam:   Physical Exam  Constitutional:       General: He is not in acute distress  Appearance: Normal appearance  He is normal weight  He is not ill-appearing, toxic-appearing or diaphoretic  HENT:      Head: Normocephalic and atraumatic  Right Ear: External ear normal       Left Ear: External ear normal       Nose: Nose normal  No congestion or rhinorrhea  Mouth/Throat:      Mouth: Mucous membranes are moist       Pharynx: Oropharynx is clear   No oropharyngeal exudate or posterior oropharyngeal erythema  Eyes:      General: No scleral icterus  Right eye: No discharge  Left eye: No discharge  Extraocular Movements: Extraocular movements intact  Conjunctiva/sclera: Conjunctivae normal       Pupils: Pupils are equal, round, and reactive to light  Neck:      Musculoskeletal: Normal range of motion  No neck rigidity or muscular tenderness  Vascular: No carotid bruit  Cardiovascular:      Rate and Rhythm: Normal rate and regular rhythm  Pulses: Normal pulses  Heart sounds: Normal heart sounds  No murmur  No friction rub  No gallop  Pulmonary:      Effort: Pulmonary effort is normal  No respiratory distress  Breath sounds: Normal breath sounds  No stridor  No wheezing or rhonchi  Abdominal:      General: Abdomen is flat  Bowel sounds are normal  There is no distension  Palpations: Abdomen is soft  There is no mass  Tenderness: There is no abdominal tenderness  There is no right CVA tenderness, left CVA tenderness, guarding or rebound  Musculoskeletal: Normal range of motion  General: No swelling  Lymphadenopathy:      Cervical: No cervical adenopathy  Skin:     General: Skin is warm and dry  Capillary Refill: Capillary refill takes less than 2 seconds  Coloration: Skin is not jaundiced  Findings: No bruising or rash  Neurological:      General: No focal deficit present  Mental Status: He is alert and oriented to person, place, and time  Mental status is at baseline  Cranial Nerves: No cranial nerve deficit  Sensory: No sensory deficit  Motor: No weakness  Psychiatric:         Mood and Affect: Mood normal            Discharge instructions/Information to patient and family:   See after visit summary for information provided to patient and family  Provisions for Follow-Up Care:  See after visit summary for information related to follow-up care and any pertinent home health orders  Disposition:     Home    For Discharges to [facility] SNF:   · Not Applicable to this Patient - Not Applicable to this Patient    Planned Readmission: No     Discharge Statement:  I spent 30 minutes discharging the patient  This time was spent on the day of discharge  I had direct contact with the patient on the day of discharge  Greater than 50% of the total time was spent examining patient, answering all patient questions, arranging and discussing plan of care with patient as well as directly providing post-discharge instructions  Additional time then spent on discharge activities  Discharge Medications:  See after visit summary for reconciled discharge medications provided to patient and family        ** Please Note: This note has been constructed using a voice recognition system **

## 2020-09-19 NOTE — ASSESSMENT & PLAN NOTE
He did have dyspeptic symptoms with nausea, vomiting and flatulence  They have significantly improved as above  May benefit form follow up but since he is stable this can be done as an outpatient

## 2020-09-19 NOTE — ASSESSMENT & PLAN NOTE
Improved with IV hydration, antiemetics and marijuana abstinence  He is ok to be discharged home and follow up with a primary care doctor and GI of his preference

## 2020-09-19 NOTE — ASSESSMENT & PLAN NOTE
He did have 1 out of 2 blood cultures collected on 09/17/2020 positive for gram-positive cocci in clusters and Gram-negative rods, the other blood culture however has remained negative to date  Patient is afebrile, nontoxic, there is no white count, there is no heart murmur  The positive culture seems to be a contaminant at this point given his clinical picture  He is aware of the results and that he should monitor himself closely for any signs of sepsis, but still requests discharge

## 2020-09-19 NOTE — PLAN OF CARE
Problem: Potential for Falls  Goal: Patient will remain free of falls  Description: INTERVENTIONS:  - Assess patient frequently for physical needs  -  Identify cognitive and physical deficits and behaviors that affect risk of falls    -  [place] fall precautions as indicated by assessment   - Educate patient/family on patient safety including physical limitations  - Instruct patient to call for assistance with activity based on assessment  - Modify environment to reduce risk of injury  - Consider OT/PT consult to assist with strengthening/mobility  Outcome: Progressing     Problem: PAIN - ADULT  Goal: Verbalizes/displays adequate comfort level or baseline comfort level  Description: Interventions:  - Encourage patient to monitor pain and request assistance  - Assess pain using appropriate pain scale  - Administer analgesics based on type and severity of pain and evaluate response  - Implement non-pharmacological measures as appropriate and evaluate response  - Consider cultural and social influences on pain and pain management  - Notify physician/advanced practitioner if interventions unsuccessful or patient reports new pain  Outcome: Progressing     Problem: INFECTION - ADULT  Goal: Absence or prevention of progression during hospitalization  Description: INTERVENTIONS:  - Assess and monitor for signs and symptoms of infection  - Monitor lab/diagnostic results  - Monitor all insertion sites, i e  indwelling lines, tubes, and drains  - Monitor endotracheal if appropriate and nasal secretions for changes in amount and color  - [place] appropriate cooling/warming therapies per order  - Administer medications as ordered  - Instruct and encourage patient and family to use good hand hygiene technique  - Identify and instruct in appropriate isolation precautions for identified infection/condition  Outcome: Progressing  Goal: Absence of fever/infection during neutropenic period  Description: INTERVENTIONS:  - Monitor WBC    Outcome: Progressing     Problem: SAFETY ADULT  Goal: Maintain or return to baseline ADL function  Description: INTERVENTIONS:  -  Assess patient's ability to carry out ADLs; assess patient's baseline for ADL function and identify physical deficits which impact ability to perform ADLs (bathing, care of mouth/teeth, toileting, grooming, dressing, etc )  - Assess/evaluate cause of self-care deficits   - Assess range of motion  - Assess patient's mobility; develop plan if impaired  - Assess patient's need for assistive devices and provide as appropriate  - Encourage maximum independence but intervene and supervise when necessary  - Involve family in performance of ADLs  - Assess for home care needs following discharge   - Consider OT consult to assist with ADL evaluation and planning for discharge  - Provide patient education as appropriate  Outcome: Progressing  Goal: Maintain or return mobility status to optimal level  Description: INTERVENTIONS:  - Assess patient's baseline mobility status (ambulation, transfers, stairs, etc )    - Identify cognitive and physical deficits and behaviors that affect mobility  - Identify mobility aids required to assist with transfers and/or ambulation (gait belt, sit-to-stand, lift, walker, cane, etc )  - [place] fall precautions as indicated by assessment  - Record patient progress and toleration of activity level on Mobility SBAR; progress patient to next Phase/Stage  - Instruct patient to call for assistance with activity based on assessment  - Consider rehabilitation consult to assist with strengthening/weightbearing, etc   Outcome: Progressing     Problem: DISCHARGE PLANNING  Goal: Discharge to home or other facility with appropriate resources  Description: INTERVENTIONS:  - Identify barriers to discharge w/patient and caregiver  - Arrange for needed discharge resources and transportation as appropriate  - Identify discharge learning needs (meds, wound care, etc )  - Arrange for interpretive services to assist at discharge as needed  - Refer to Case Management Department for coordinating discharge planning if the patient needs post-hospital services based on physician/advanced practitioner order or complex needs related to functional status, cognitive ability, or social support system  Outcome: Progressing     Problem: Knowledge Deficit  Goal: Patient/family/caregiver demonstrates understanding of disease process, treatment plan, medications, and discharge instructions  Description: Complete learning assessment and assess knowledge base    Interventions:  - Provide teaching at level of understanding  - Provide teaching via preferred learning methods  Outcome: Progressing

## 2020-09-19 NOTE — DISCHARGE INSTR - AVS FIRST PAGE
Dear Michell Enrique,     It was our pleasure to care for you here at [facility]  It is our hope that we were always able to exceed the expected standards for your care during your stay  You were hospitalized due to intractable nausea and abdominal discomfort  You were cared for on the 4th floor under the service of Patricia Bermudez MD with the [facility]  If you have any questions or concerns related to this hospitalization, you may contact us at [phone]  For follow up as well as medication refills, we recommend that you follow up with your primary care physician  A registered nurse will reach out to you by phone within a few days after your discharge to answer any additional questions that you may have after going home  However, at this time we provide for you here, the most important instructions / recommendations at discharge:     · Notable Medication Adjustments -   · Take pantoprazole 20 mg tablet once a day  May also take Zofran 4 mg by mouth up to every 8 hours as needed for nausea  If flatulence persists may also take simethicone as needed  Scripts sent yo your pharmacy  · Avoid marijuana as it may worsen your nausea  · Important follow up information -   · Follow-up with her primary care physician and a gastroenterologist because recent hospitalization  · Other Instructions -   · Continue to use your insulin pump as prescribed by your endocrinologist  Have a consistent carbohydrate intake  · Please review this entire after visit summary as additional general instructions including medication list, appointments, activity, diet, any pertinent wound care, and other additional recommendations from your care team that may be provided for you        Sincerely,     Patricia Bermudez MD

## 2020-09-21 LAB
BACTERIA BLD CULT: ABNORMAL
BACTERIA BLD CULT: ABNORMAL
GRAM STN SPEC: ABNORMAL
GRAM STN SPEC: ABNORMAL

## 2020-09-22 NOTE — ED PROVIDER NOTES
EMERGENCY MEDICINE NOTE        PATIENT IDENTIFICATION PHYSICIAN/SERVICE INFORMATION   Name: Lidia Sharif  MRN: [id number]  YOB: 1965  Age/Sex: 50 y o  male  Preferred Language: English  Code Status: Prior  Encounter Date: 9/17/2020  Attending Physician: Jazz Souza DO  Admitting Physician: Kristopher Perea MD   Primary Care Physician: No primary care provider on file  Primary Care Phone: None       CHIEF COMPLAINT     Chief Complaint   Patient presents with    Epigastric Pain     Pt reports epigastric pain that shoots to his back and is burning  Reports Nausea and vomiting x2days        Vomiting         HISTORY OF PRESENT ILLNESS       History provided by:  Patient   used: No    Epigastric Pain   Pain location:  Epigastric  Pain quality: aching and burning    Pain radiates to:  Upper back  Pain radiates to the back: yes    Pain severity:  Moderate  Onset quality:  Gradual  Timing:  Constant  Progression:  Waxing and waning  Chronicity:  New  Context: stress (pt admits to stressful argument with son, who recently was released from incarceration)    Context: not breathing, no drug use, not eating, no intercourse, not lifting, no movement, not raising an arm, not at rest and no trauma    Relieved by:  None tried  Worsened by:  Nothing tried  Ineffective treatments:  None tried  Associated symptoms: abdominal pain, anorexia, anxiety, heartburn, nausea and vomiting    Associated symptoms: no AICD problem, no altered mental status, no back pain, no claudication, no cough, no diaphoresis, no dizziness, no dysphagia, no fatigue, no fever, no headache, no lower extremity edema, no near-syncope, no numbness, no orthopnea, no palpitations, no PND, no shortness of breath, no syncope and no weakness    Risk factors: hypertension and male sex    Risk factors: no aortic disease, no coronary artery disease, no diabetes mellitus, no Emmanuel-Danlos syndrome, no high cholesterol, no immobilization, no Marfan's syndrome, not obese, no prior DVT/PE, no smoking and no surgery    Vomiting   Associated symptoms: abdominal pain    Associated symptoms: no arthralgias, no chills, no cough, no diarrhea, no fever, no headaches and no sore throat          PAST MEDICAL AND SURGICAL HISTORY     History reviewed  No pertinent past medical history  History reviewed  No pertinent surgical history  History reviewed  No pertinent family history  E-Cigarette/Vaping    E-Cigarette Use Never User      E-Cigarette/Vaping Substances    Nicotine No     THC Yes     CBD No      Social History     Tobacco Use    Smoking status: Never Smoker    Smokeless tobacco: Never Used   Substance Use Topics    Alcohol use: Not on file    Drug use: Yes     Types: Marijuana     Comment: last use 2 days ago         ALLERGIES     Allergies   Allergen Reactions    Shellfish-Derived Products Hives         HOME MEDICATIONS     None         REVIEW OF SYSTEMS     Review of Systems   Constitutional: Negative for activity change, appetite change, chills, diaphoresis, fatigue and fever  HENT: Negative for congestion, drooling, ear discharge, ear pain, facial swelling, postnasal drip, rhinorrhea, sinus pressure, sinus pain, sore throat, trouble swallowing and voice change  Eyes: Negative for discharge and visual disturbance  Respiratory: Negative for apnea, cough, choking, chest tightness, shortness of breath, wheezing and stridor  Cardiovascular: Negative for chest pain, palpitations, orthopnea, claudication, leg swelling, syncope, PND and near-syncope  Gastrointestinal: Positive for abdominal pain, anorexia, heartburn, nausea and vomiting  Negative for abdominal distention, anal bleeding, blood in stool, constipation, diarrhea and rectal pain  Endocrine: Negative for polydipsia, polyphagia and polyuria     Genitourinary: Negative for decreased urine volume, difficulty urinating, dysuria, flank pain, frequency, genital sores, hematuria and urgency  Musculoskeletal: Negative for arthralgias, back pain, joint swelling and neck pain  Skin: Negative for rash and wound  Neurological: Negative for dizziness, weakness, light-headedness, numbness and headaches  Hematological: Negative for adenopathy  Psychiatric/Behavioral: The patient is not nervous/anxious  All other systems reviewed and are negative  PHYSICAL EXAMINATION     ED Triage Vitals   Temperature Pulse Respirations Blood Pressure SpO2   09/17/20 2045 09/17/20 2045 09/17/20 2045 09/17/20 2045 09/17/20 2045   98 1 °F (36 7 °C) (!) 49 18 (!) 171/74 100 %      Temp Source Heart Rate Source Patient Position - Orthostatic VS BP Location FiO2 (%)   09/17/20 2045 09/17/20 2045 09/17/20 2242 09/17/20 2045 --   Oral Monitor Lying Right arm       Pain Score       09/17/20 2241       Worst Possible Pain         Wt Readings from Last 3 Encounters:   09/18/20 72 6 kg (160 lb 0 9 oz)   06/03/15 82 8 kg (182 lb 7 oz)   04/02/15 82 7 kg (182 lb 4 9 oz)         Physical Exam  Vitals signs and nursing note reviewed  Constitutional:       General: He is not in acute distress  Appearance: He is well-developed and normal weight  He is not ill-appearing, toxic-appearing or diaphoretic  HENT:      Head: Normocephalic and atraumatic  Mouth/Throat:      Mouth: Mucous membranes are moist    Eyes:      Conjunctiva/sclera: Conjunctivae normal       Pupils: Pupils are equal, round, and reactive to light  Neck:      Musculoskeletal: Normal range of motion and neck supple  Vascular: No carotid bruit, hepatojugular reflux or JVD  Cardiovascular:      Rate and Rhythm: Normal rate and regular rhythm  No extrasystoles are present  Chest Wall: PMI is not displaced  Pulses: Normal pulses  Radial pulses are 2+ on the right side and 2+ on the left side  Dorsalis pedis pulses are 2+ on the right side and 2+ on the left side          Posterior tibial pulses are 2+ on the right side and 2+ on the left side  Heart sounds: Normal heart sounds  No murmur  No friction rub  No gallop  Pulmonary:      Effort: Pulmonary effort is normal  No respiratory distress  Breath sounds: Normal breath sounds  No stridor  No wheezing or rales  Chest:      Chest wall: No tenderness  Abdominal:      General: Bowel sounds are normal  There is no distension  Palpations: Abdomen is soft  Abdomen is not rigid  There is no mass  Tenderness: There is abdominal tenderness (epigastric, ruq)  There is no right CVA tenderness, left CVA tenderness, guarding or rebound  Negative signs include Kay's sign and McBurney's sign  Hernia: No hernia is present  Comments: Negative Kay's  Negative Appendiceal signs (Psoas, Rovsing's, Obturator)  Negative Peritoneal Signs   Musculoskeletal: Normal range of motion  Skin:     General: Skin is warm and dry  Capillary Refill: Capillary refill takes less than 2 seconds  Findings: No rash  Neurological:      General: No focal deficit present  Mental Status: He is alert and oriented to person, place, and time             DIAGNOSTIC RESULTS     Laboratory results:    Labs Reviewed   CBC AND DIFFERENTIAL - Abnormal       Result Value Ref Range Status    WBC 7 91  4 31 - 10 16 Thousand/uL Final    RBC 4 63  3 88 - 5 62 Million/uL Final    Hemoglobin 14 7  12 0 - 17 0 g/dL Final    Hematocrit 42 9  36 5 - 49 3 % Final    MCV 93  82 - 98 fL Final    MCH 31 7  26 8 - 34 3 pg Final    MCHC 34 3  31 4 - 37 4 g/dL Final    RDW 12 2  11 6 - 15 1 % Final    MPV 10 0  8 9 - 12 7 fL Final    Platelets 016  459 - 390 Thousands/uL Final    nRBC 0  /100 WBCs Final    Neutrophils Relative 83 (*) 43 - 75 % Final    Immat GRANS % 0  0 - 2 % Final    Lymphocytes Relative 11 (*) 14 - 44 % Final    Monocytes Relative 6  4 - 12 % Final    Eosinophils Relative 0  0 - 6 % Final    Basophils Relative 0  0 - 1 % Final Neutrophils Absolute 6 55  1 85 - 7 62 Thousands/µL Final    Immature Grans Absolute 0 02  0 00 - 0 20 Thousand/uL Final    Lymphocytes Absolute 0 87  0 60 - 4 47 Thousands/µL Final    Monocytes Absolute 0 44  0 17 - 1 22 Thousand/µL Final    Eosinophils Absolute 0 01  0 00 - 0 61 Thousand/µL Final    Basophils Absolute 0 02  0 00 - 0 10 Thousands/µL Final   COMPREHENSIVE METABOLIC PANEL - Abnormal    Sodium 139  136 - 145 mmol/L Final    Potassium 3 5  3 5 - 5 3 mmol/L Final    Chloride 100  100 - 108 mmol/L Final    CO2 24  21 - 32 mmol/L Final    ANION GAP 15 (*) 4 - 13 mmol/L Final    BUN 10  5 - 25 mg/dL Final    Creatinine 0 94  0 60 - 1 30 mg/dL Final    Comment: Standardized to IDMS reference method    Glucose 141 (*) 65 - 140 mg/dL Final    Comment: If the patient is fasting, the ADA then defines impaired fasting glucose as > 100 mg/dL and diabetes as > or equal to 123 mg/dL  Specimen collection should occur prior to Sulfasalazine administration due to the potential for falsely depressed results  Specimen collection should occur prior to Sulfapyridine administration due to the potential for falsely elevated results  Calcium 9 4  8 3 - 10 1 mg/dL Final    AST 25  5 - 45 U/L Final    Comment: Specimen collection should occur prior to Sulfasalazine administration due to the potential for falsely depressed results  ALT 21  12 - 78 U/L Final    Comment: Specimen collection should occur prior to Sulfasalazine administration due to the potential for falsely depressed results  Alkaline Phosphatase 68  46 - 116 U/L Final    Total Protein 8 4 (*) 6 4 - 8 2 g/dL Final    Albumin 4 3  3 5 - 5 0 g/dL Final    Total Bilirubin 0 52  0 20 - 1 00 mg/dL Final    Comment: Use of this assay is not recommended for patients undergoing treatment with eltrombopag due to the potential for falsely elevated results      eGFR 92  ml/min/1 73sq m Final    Narrative:     [place] guidelines for Chronic Kidney Disease (CKD):     Stage 1 with normal or high GFR (GFR > 90 mL/min/1 73 square meters)    Stage 2 Mild CKD (GFR = 60-89 mL/min/1 73 square meters)    Stage 3A Moderate CKD (GFR = 45-59 mL/min/1 73 square meters)    Stage 3B Moderate CKD (GFR = 30-44 mL/min/1 73 square meters)    Stage 4 Severe CKD (GFR = 15-29 mL/min/1 73 square meters)    Stage 5 End Stage CKD (GFR <15 mL/min/1 73 square meters)  Note: GFR calculation is accurate only with a steady state creatinine   BLOOD GAS, VENOUS - Abnormal    pH, [place] 7 573 (*) 7 300 - 7 400 Final    pCO2, [place] 28 2 (*) 42 0 - 50 0 mm Hg Final    pO2, [place] 77 3 (*) 35 0 - 45 0 mm Hg Final    HCO3, [place] 25 4  24 - 30 mmol/L Final    Base Excess, [place] 4 5  mmol/L Final    O2 Content, [place] 20 8  ml/dL Final    O2 HGB, VENOUS 95 2 (*) 60 0 - 80 0 % Final   BETA HYDROXYBUTYRATE - Abnormal    BETA-HYDROXYBUTYRATE 1 6 (*) <0 6 mmol/L Final   TROPONIN I - Abnormal    Troponin I 0 05 (*) <=0 04 ng/mL Final    Comment: Siemens Chemistry analyzer 99% cutoff is > 0 04 ng/mL in network labs     o cTnI 99% cutoff is useful only when applied to patients in the clinical setting of myocardial ischemia   o cTnI 99% cutoff should be interpreted in the context of clinical history, ECG findings and possibly cardiac imaging to establish correct diagnosis  o cTnI 99% cutoff may be suggestive but clearly not indicative of a coronary event without the clinical setting of myocardial ischemia       URINALYSIS WITH REFLEX TO SCOPE - Abnormal    Color, UA Light Yellow   Final    Clarity, UA Clear   Final    Specific [place], UA 1 010  1 003 - 1 030 Final    pH, UA 7 5  4 5, 5 0, 5 5, 6 0, 6 5, 7 0, 7 5, 8 0 Final    Leukocytes, UA Negative  Negative Final    Nitrite, UA Negative  Negative Final    Protein, UA Negative  Negative mg/dl Final    Glucose, UA Negative  Negative mg/dl Final    Ketones, UA 15 (1+) (*) Negative mg/dl Final    Urobilinogen, UA 0 2  0 2, 1 0 E U /dl E U /dl Final Bilirubin, UA Negative  Negative Final    Blood, UA Trace-Intact (*) Negative Final   NT-BNP PRO (BRAIN NATRIURETIC PEPTIDE) - Abnormal    NT-proBNP 743 (*) <125 pg/mL Final   URINE MICROSCOPIC - Abnormal    RBC, UA 0-1 (*) None Seen, 0-5 /hpf Final    WBC, UA None Seen  None Seen, 0-5, 5-55, 5-65 /hpf Final    Epithelial Cells None Seen  None Seen, Occasional /hpf Final    Bacteria, UA None Seen  None Seen, Occasional /hpf Final   LIPASE - Normal    Lipase 74  73 - 393 u/L Final   LACTIC ACID, PLASMA - Normal    LACTIC ACID 1 9  0 5 - 2 0 mmol/L Final    Narrative:     Result may be elevated if tourniquet was used during collection  MAGNESIUM - Normal    Magnesium 1 6  1 6 - 2 6 mg/dL Final   PROTIME-INR - Normal    Protime 13 8  11 6 - 14 5 seconds Final    INR 1 05  0 84 - 1 19 Final   APTT - Normal    PTT 27  23 - 37 seconds Final    Comment: Therapeutic Heparin Range =  60-90 seconds   TROPONIN I - Normal    Troponin I 0 04  <=0 04 ng/mL Final    Comment: Siemens Chemistry analyzer 99% cutoff is > 0 04 ng/mL in network labs     o cTnI 99% cutoff is useful only when applied to patients in the clinical setting of myocardial ischemia   o cTnI 99% cutoff should be interpreted in the context of clinical history, ECG findings and possibly cardiac imaging to establish correct diagnosis  o cTnI 99% cutoff may be suggestive but clearly not indicative of a coronary event without the clinical setting of myocardial ischemia  POCT GLUCOSE - Normal    POC Glucose 128  65 - 140 mg/dl Final       All labs reviewed and utilized in the medical decision making process    Radiology results:    CT pe study w abdomen pelvis w contrast   Final Result      1  No evidence of pulmonary embolism  2   No significant lymphadenopathy within the chest, abdomen, or pelvis  3   Mild urinary bladder wall thickening which is likely due to underdistention however correlate with urinalysis        Workstation performed: [id number] All radiology studies independently viewed by me and interpreted by the radiologist       PROCEDURES     ECG 12 Lead Documentation Only    Date/Time: 9/19/2020 8:45 PM  Performed by: Colby Parham PA-C  Authorized by: Colby Parham PA-C     Indications / Diagnosis:  Epigastric pain  ECG reviewed by me, the ED Provider: yes    Patient location:  ED  Previous ECG:     Previous ECG:  Unavailable    Comparison to cardiac monitor: Yes    Interpretation:     Interpretation: abnormal    Rate:     ECG rate assessment: normal    Rhythm:     Rhythm: sinus rhythm    Ectopy:     Ectopy: none    QRS:     QRS axis:  Left    QRS intervals:  Normal  Conduction:     Conduction: normal    ST segments:     ST segments:  Non-specific  T waves:     T waves: normal            Invasive Devices     None                 ASSESSMENT AND PLAN     MDM    Initial ED assessment:  Prince Campbell is a 50 y o  male with significant PMH for HTN who presents with Abdominal Pain  Vitals signs reviewed and WNL  Physical examination is remarkable for ttp ruq, epigastric region    Initial Ddx  includes but is not limited to:    appendicitis, gastroenteritis, gastritis, PUD, GERD, gastroparesis, hepatitis, pancreatitis, colitis, enteritis, diverticulitis, food poisoning, mesenteric adenitis, mesenteric ischemia, IBD, IBS, ileus, bowel obstruction, volvulus, internal hernia, cholecystitis, biliary colic, choledocholithiasis, perforated viscus, splenic etiology, constipation, AAA, testicular torsion, renal colic, pyelonephritis, UTI; doubt cardiac etiology  Initial ED plan:   Plan will be to perform diagnostic testing of Blood labs, Urinalysis, EKG, XR of chest and RUQ and treat symptomatically   Final ED summary/disposition: ADMIT MEDICINE: Discussed course of care with Patient and Family with Permission who is agreeable to admission for further eval, treatment   Called inpatient   [facility] Hospitalist who is aware of the patient, case discussed including HPI, pertinent PMH, ED Course, and workup   Hospitalist agreed with plan and will accept for admission/observation under the medicine service    MDM  Reviewed: previous chart, nursing note and vitals  Interpretation: ECG, labs, x-ray and ultrasound          ED COURSE OF CARE AND REASSESSMENT                                          Medications   ondansetron (ZOFRAN) injection 4 mg (4 mg Intravenous Given 9/17/20 2129)   sodium chloride 0 9 % bolus 1,000 mL (0 mL Intravenous Stopped 9/17/20 2348)   pantoprazole (PROTONIX) injection 40 mg (40 mg Intravenous Given 9/17/20 2129)   sucralfate (CARAFATE) tablet 1 g (1 g Oral Given 9/17/20 2213)   sodium chloride 0 9 % bolus 1,000 mL (0 mL Intravenous Stopped 9/18/20 0028)   morphine (PF) 4 mg/mL injection 4 mg (4 mg Intravenous Given 9/17/20 2241)   iohexol (OMNIPAQUE) 350 MG/ML injection (MULTI-DOSE) 100 mL (100 mL Intravenous Given 9/17/20 2323)   sterile water injection **ADS Override Pull** (2 1 mL  Given 9/17/20 2348)   OLANZapine (ZyPREXA) IM injection 3 5 mg (3 5 mg Intramuscular Given 9/17/20 2348)   aluminum-magnesium hydroxide-simethicone (MYLANTA) 200-200-20 mg/5 mL oral suspension 30 mL (30 mL Oral Given 9/18/20 0026)   Lidocaine Viscous HCl (XYLOCAINE) 2 % mucosal solution 15 mL (15 mL Swish & Swallow Given 9/18/20 0026)         FINAL IMPRESSION     Final diagnoses:   Intractable nausea and vomiting   Abdominal pain   Elevated troponin   Marijuana use   Dehydration         DISPOSITION AND PLANNING     Time reflects when diagnosis was documented in both MDM as applicable and the Disposition within this note     Time User Action Codes Description Comment    9/18/2020 12:16 AM Autumn Callander Add [R11 2] Intractable nausea and vomiting     9/18/2020 12:16 AM Autumn Callander Add [R10 9] Abdominal pain     9/18/2020 12:16 AM Autumn Callander Add [R79 89] Elevated troponin     9/18/2020 12:16 AM Autumn Callander Add [F12 90] Marijuana use     9/18/2020 12:17 AM Alyx Garnica Add [E86 0] Dehydration     9/19/2020  9:57 AM Jackito Amanda Recinos Necessary Add [R10 13] Epigastric pain       ED Disposition     ED Disposition Condition Date/Time Comment    Admit Stable Fri Sep 18, 2020 12:17 AM Case was discussed with [facility] and the patient's admission status was agreed to be Admission Status: observation status to the service of Dr Herminio Neri   Follow-up Information     Follow up With Specialties Details Why Contact Laurent Beaulieu MD Internal Medicine Follow up Follow up with primary care physician, I recommend Dr Zac Beaulieu if you do not have one [address]  [address]      Kat Lafleur MD Gastroenterology Follow up Follow up with a gastroenterologist, I recommend Dr Carmen Flynn if you dont have one [address]  [address] [facility]  [phone]                Via Monty Holman MD   [facility] [address]  [phone]    Follow up  Follow up with primary care physician, I recommend Dr Zac Beaulieu if you do not have one    Kat Lafleur MD  [address]    [place] [address]  [phone]    Follow up  Follow up with a gastroenterologist, I recommend Dr Carmen Flynn if you dont have one        DISCHARGE MEDICATIONS     Discharge Medication List as of 9/19/2020 10:52 AM      START taking these medications    Details   ondansetron (ZOFRAN-ODT) 4 mg disintegrating tablet Take 1 tablet (4 mg total) by mouth every 8 (eight) hours as needed for nausea or vomiting for up to 5 days, Starting Sat 9/19/2020, Until Thu 9/24/2020, Normal      pantoprazole (PROTONIX) 20 mg tablet Take 1 tablet (20 mg total) by mouth daily, Starting Sat 9/19/2020, Until Mon 10/19/2020, Normal      simethicone (MYLICON,GAS-X) 812 MG capsule Take 1 capsule (180 mg total) by mouth every 12 (twelve) hours as needed for flatulence for up to 5 days, Starting Sat 9/19/2020, Until u 9/24/2020, Normal             Outpatient Discharge Orders   Discharge Diet     Activity as tolerated       PDMP Review     None          ROSELIA Rivas, [place]  09/22/20 1975 Patient/Caregiver provided printed discharge information.

## 2020-09-23 LAB — BACTERIA BLD CULT: NORMAL

## 2020-09-23 NOTE — PHYSICIAN ADVISOR
Current patient class: Inpatient  The patient is currently on Hospital Day: 3 at [facility]      This patient was originally admitted to the hospital under observation class  After admission the patient was reevaluated and determined to require further hospitalization  The patient was then documented to require at least a 2nd midnight in the hospital  As such the patient was then expected to satisfy the 2 midnight benchmark and was therefore eligible for inpatient admission  After review of the relevant documentation, labs, vital signs and test results, the patient is appropriate for INPATIENT ADMISSION  Admission to the hospital as an inpatient is a complex decision making process which requires the practitioner to consider the patients presenting complaint, history and physical examination and all relevant testing  With this in mind, in this case, the patient was deemed appropriate for INPATIENT ADMISSION  After review of the documentation and testing available at the time of the admission I concur with this clinical determination of medical necessity  Rationale is as follows: The patient is a 21-year-old male who began care in the emergency department on September 17, 2020  He has type 1 diabetes with an insulin pump and reported intractable nausea and vomiting  The initial plan was for observation along with antiemetics and intravenous fluids  He was evaluated again in the early afternoon on September 18, 2020  Plan was to attempt a clear liquid diet and advanced as tolerated  It was felt that he required continued hospital stay at least for a second midnight  He was changed to inpatient class  He ultimately improved and it was recommended he abstain from marijuana use  He was also having some signs of dyspepsia  1/2 blood cultures were positive  He was evaluated for this however it was felt to be contamination based on his clinical picture    The discharge summary indicates that it was positive for both Gram-positive cocci and Gram-negative rods however the other blood cultures were negative  Based on all of these factors it was appropriate to hospitalize the patient for the length of time noted  He was hospitalized for two midnights given his symptoms in the setting of uncontrolled type 1 diabetes along with the need to exclude true bacteremia  The patients vitals on arrival were   ED Triage Vitals   Temperature Pulse Respirations Blood Pressure SpO2   09/17/20 2045 09/17/20 2045 09/17/20 2045 09/17/20 2045 09/17/20 2045   98 1 °F (36 7 °C) (!) 49 18 (!) 171/74 100 %      Temp Source Heart Rate Source Patient Position - Orthostatic VS BP Location FiO2 (%)   09/17/20 2045 09/17/20 2045 09/17/20 2242 09/17/20 2045 --   Oral Monitor Lying Right arm       Pain Score       09/17/20 2241       Worst Possible Pain           History reviewed  No pertinent past medical history  History reviewed  No pertinent surgical history  The patient was admitted to the hospital at 95 76 89 on 9/18/20 for the following diagnosis:  Dehydration [E86 0]  Vomiting [R11 10]  Epigastric pain [R10 13]  Abdominal pain [R10 9]  Elevated troponin [R79 89]  Intractable nausea and vomiting [R11 2]  Marijuana use [F12 90]    Consults have been placed to:   None    Vitals:    09/18/20 0657 09/18/20 1455 09/18/20 2143 09/19/20 0727   BP: 111/56 117/53 99/53 106/60   BP Location: Right arm Right arm Right arm Right arm   Pulse: (!) 53 68 61 63   Resp:  18 17 16   Temp: 98 8 °F (37 1 °C) 98 1 °F (36 7 °C) 98 4 °F (36 9 °C) 98 6 °F (37 °C)   TempSrc: Oral Oral Oral Oral   SpO2: 96% 96% 96% 95%   Weight:       Height:           Most recent labs:    No results for input(s): WBC, HGB, HCT, PLT, K, NA, CALCIUM, BUN, CREATININE, LIPASE, AMYLASE, INR, TROPONINI, CKTOTAL, AST, ALT, ALKPHOS, BILITOT in the last 72 hours      Scheduled Meds:  Continuous Infusions:No current facility-administered medications for this encounter  PRN Meds:      Surgical procedures (if appropriate):

## 2020-10-05 ENCOUNTER — HOSPITAL ENCOUNTER (EMERGENCY)
Facility: HOSPITAL | Age: 55
Discharge: HOME | End: 2020-10-05
Attending: EMERGENCY MEDICINE
Payer: MEDICARE

## 2020-10-05 VITALS
WEIGHT: 160 LBS | DIASTOLIC BLOOD PRESSURE: 66 MMHG | RESPIRATION RATE: 18 BRPM | BODY MASS INDEX: 25.82 KG/M2 | SYSTOLIC BLOOD PRESSURE: 112 MMHG | TEMPERATURE: 98.5 F | HEART RATE: 60 BPM | OXYGEN SATURATION: 98 %

## 2020-10-05 DIAGNOSIS — S61.411A LACERATION OF RIGHT HAND: Primary | ICD-10-CM

## 2020-10-05 PROCEDURE — 12002 RPR S/N/AX/GEN/TRNK2.6-7.5CM: CPT | Performed by: EMERGENCY MEDICINE

## 2020-10-05 PROCEDURE — 99282 EMERGENCY DEPT VISIT SF MDM: CPT

## 2020-10-05 PROCEDURE — 99282 EMERGENCY DEPT VISIT SF MDM: CPT | Performed by: EMERGENCY MEDICINE

## 2020-10-05 RX ORDER — LIDOCAINE HYDROCHLORIDE 10 MG/ML
5 INJECTION, SOLUTION EPIDURAL; INFILTRATION; INTRACAUDAL; PERINEURAL ONCE
Status: COMPLETED | OUTPATIENT
Start: 2020-10-05 | End: 2020-10-05

## 2020-10-05 RX ADMIN — LIDOCAINE HYDROCHLORIDE 5 ML: 10 INJECTION, SOLUTION EPIDURAL; INFILTRATION; INTRACAUDAL; PERINEURAL at 08:51

## 2021-05-02 ENCOUNTER — APPOINTMENT (OUTPATIENT)
Dept: RADIOLOGY | Facility: HOSPITAL | Age: 56
DRG: 392 | End: 2021-05-02
Payer: MEDICARE

## 2021-05-02 ENCOUNTER — HOSPITAL ENCOUNTER (INPATIENT)
Facility: HOSPITAL | Age: 56
LOS: 3 days | Discharge: HOME | DRG: 392 | End: 2021-05-06
Attending: EMERGENCY MEDICINE | Admitting: INTERNAL MEDICINE
Payer: MEDICARE

## 2021-05-02 ENCOUNTER — APPOINTMENT (EMERGENCY)
Dept: CT IMAGING | Facility: HOSPITAL | Age: 56
DRG: 392 | End: 2021-05-02
Payer: MEDICARE

## 2021-05-02 DIAGNOSIS — R11.2 NAUSEA & VOMITING: ICD-10-CM

## 2021-05-02 DIAGNOSIS — R10.13 EPIGASTRIC PAIN: ICD-10-CM

## 2021-05-02 DIAGNOSIS — R10.9 ABDOMINAL PAIN: ICD-10-CM

## 2021-05-02 DIAGNOSIS — K57.92 DIVERTICULITIS: ICD-10-CM

## 2021-05-02 DIAGNOSIS — E10.9 TYPE 1 DIABETES MELLITUS (HCC): Primary | ICD-10-CM

## 2021-05-02 DIAGNOSIS — K57.90 DIVERTICULOSIS: ICD-10-CM

## 2021-05-02 DIAGNOSIS — R11.2 INTRACTABLE NAUSEA AND VOMITING: ICD-10-CM

## 2021-05-02 DIAGNOSIS — K29.70 GASTRITIS: ICD-10-CM

## 2021-05-02 PROBLEM — R79.89 ELEVATED TROPONIN LEVEL NOT DUE MYOCARDIAL INFARCTION: Status: ACTIVE | Noted: 2021-05-02

## 2021-05-02 PROBLEM — Z95.820 STATUS POST ANGIOPLASTY WITH STENT: Status: ACTIVE | Noted: 2021-05-02

## 2021-05-02 PROBLEM — E03.9 HYPOTHYROIDISM: Status: ACTIVE | Noted: 2021-05-02

## 2021-05-02 PROBLEM — J44.9 COPD (CHRONIC OBSTRUCTIVE PULMONARY DISEASE) (HCC): Status: ACTIVE | Noted: 2021-05-02

## 2021-05-02 PROBLEM — R77.8 ELEVATED TROPONIN LEVEL NOT DUE MYOCARDIAL INFARCTION: Status: ACTIVE | Noted: 2021-05-02

## 2021-05-02 LAB
ALBUMIN SERPL BCP-MCNC: 4.1 G/DL (ref 3.5–5)
ALP SERPL-CCNC: 78 U/L (ref 46–116)
ALT SERPL W P-5'-P-CCNC: 25 U/L (ref 12–78)
AMPHETAMINES SERPL QL SCN: NEGATIVE
ANION GAP SERPL CALCULATED.3IONS-SCNC: 9 MMOL/L (ref 4–13)
AST SERPL W P-5'-P-CCNC: 21 U/L (ref 5–45)
BARBITURATES UR QL: NEGATIVE
BASOPHILS # BLD AUTO: 0.02 THOUSANDS/ΜL (ref 0–0.1)
BASOPHILS NFR BLD AUTO: 0 % (ref 0–1)
BENZODIAZ UR QL: NEGATIVE
BILIRUB SERPL-MCNC: 0.61 MG/DL (ref 0.2–1)
BUN SERPL-MCNC: 10 MG/DL (ref 5–25)
CALCIUM SERPL-MCNC: 8.9 MG/DL (ref 8.3–10.1)
CHLORIDE SERPL-SCNC: 101 MMOL/L (ref 100–108)
CO2 SERPL-SCNC: 26 MMOL/L (ref 21–32)
COCAINE UR QL: NEGATIVE
CREAT SERPL-MCNC: 0.9 MG/DL (ref 0.6–1.3)
EOSINOPHIL # BLD AUTO: 0.01 THOUSAND/ΜL (ref 0–0.61)
EOSINOPHIL NFR BLD AUTO: 0 % (ref 0–6)
ERYTHROCYTE [DISTWIDTH] IN BLOOD BY AUTOMATED COUNT: 12.1 % (ref 11.6–15.1)
GFR SERPL CREATININE-BSD FRML MDRD: 97 ML/MIN/1.73SQ M
GLUCOSE SERPL-MCNC: 217 MG/DL (ref 65–140)
HCT VFR BLD AUTO: 42.1 % (ref 36.5–49.3)
HGB BLD-MCNC: 14.1 G/DL (ref 12–17)
IMM GRANULOCYTES # BLD AUTO: 0.04 THOUSAND/UL (ref 0–0.2)
IMM GRANULOCYTES NFR BLD AUTO: 1 % (ref 0–2)
LIPASE SERPL-CCNC: 48 U/L (ref 73–393)
LYMPHOCYTES # BLD AUTO: 0.79 THOUSANDS/ΜL (ref 0.6–4.47)
LYMPHOCYTES NFR BLD AUTO: 9 % (ref 14–44)
MCH RBC QN AUTO: 31.6 PG (ref 26.8–34.3)
MCHC RBC AUTO-ENTMCNC: 33.5 G/DL (ref 31.4–37.4)
MCV RBC AUTO: 94 FL (ref 82–98)
METHADONE UR QL: NEGATIVE
MONOCYTES # BLD AUTO: 0.38 THOUSAND/ΜL (ref 0.17–1.22)
MONOCYTES NFR BLD AUTO: 4 % (ref 4–12)
NEUTROPHILS # BLD AUTO: 7.55 THOUSANDS/ΜL (ref 1.85–7.62)
NEUTS SEG NFR BLD AUTO: 86 % (ref 43–75)
NRBC BLD AUTO-RTO: 0 /100 WBCS
OPIATES UR QL SCN: POSITIVE
OXYCODONE+OXYMORPHONE UR QL SCN: NEGATIVE
PCP UR QL: NEGATIVE
PLATELET # BLD AUTO: 234 THOUSANDS/UL (ref 149–390)
PMV BLD AUTO: 10.2 FL (ref 8.9–12.7)
POTASSIUM SERPL-SCNC: 3.6 MMOL/L (ref 3.5–5.3)
PROT SERPL-MCNC: 7.9 G/DL (ref 6.4–8.2)
RBC # BLD AUTO: 4.46 MILLION/UL (ref 3.88–5.62)
SODIUM SERPL-SCNC: 136 MMOL/L (ref 136–145)
THC UR QL: POSITIVE
TROPONIN I SERPL-MCNC: 0.07 NG/ML
TROPONIN I SERPL-MCNC: 0.08 NG/ML
TSH SERPL DL<=0.05 MIU/L-ACNC: 0.94 UIU/ML (ref 0.36–3.74)
WBC # BLD AUTO: 8.79 THOUSAND/UL (ref 4.31–10.16)

## 2021-05-02 PROCEDURE — 96375 TX/PRO/DX INJ NEW DRUG ADDON: CPT

## 2021-05-02 PROCEDURE — 85025 COMPLETE CBC W/AUTO DIFF WBC: CPT | Performed by: EMERGENCY MEDICINE

## 2021-05-02 PROCEDURE — 36415 COLL VENOUS BLD VENIPUNCTURE: CPT | Performed by: EMERGENCY MEDICINE

## 2021-05-02 PROCEDURE — 99285 EMERGENCY DEPT VISIT HI MDM: CPT | Performed by: EMERGENCY MEDICINE

## 2021-05-02 PROCEDURE — 80307 DRUG TEST PRSMV CHEM ANLYZR: CPT | Performed by: FAMILY MEDICINE

## 2021-05-02 PROCEDURE — 80053 COMPREHEN METABOLIC PANEL: CPT | Performed by: EMERGENCY MEDICINE

## 2021-05-02 PROCEDURE — 83690 ASSAY OF LIPASE: CPT | Performed by: EMERGENCY MEDICINE

## 2021-05-02 PROCEDURE — 71045 X-RAY EXAM CHEST 1 VIEW: CPT

## 2021-05-02 PROCEDURE — 74176 CT ABD & PELVIS W/O CONTRAST: CPT

## 2021-05-02 PROCEDURE — 84484 ASSAY OF TROPONIN QUANT: CPT | Performed by: EMERGENCY MEDICINE

## 2021-05-02 PROCEDURE — 99285 EMERGENCY DEPT VISIT HI MDM: CPT

## 2021-05-02 PROCEDURE — 84443 ASSAY THYROID STIM HORMONE: CPT | Performed by: EMERGENCY MEDICINE

## 2021-05-02 PROCEDURE — 96365 THER/PROPH/DIAG IV INF INIT: CPT

## 2021-05-02 PROCEDURE — G1004 CDSM NDSC: HCPCS

## 2021-05-02 PROCEDURE — 93005 ELECTROCARDIOGRAM TRACING: CPT

## 2021-05-02 PROCEDURE — 96361 HYDRATE IV INFUSION ADD-ON: CPT

## 2021-05-02 RX ORDER — LEVOTHYROXINE SODIUM 0.1 MG/1
100 TABLET ORAL
Status: DISCONTINUED | OUTPATIENT
Start: 2021-05-03 | End: 2021-05-06 | Stop reason: HOSPADM

## 2021-05-02 RX ORDER — ALBUTEROL SULFATE 90 UG/1
2 AEROSOL, METERED RESPIRATORY (INHALATION) EVERY 6 HOURS PRN
COMMUNITY
Start: 2020-10-07 | End: 2021-10-07

## 2021-05-02 RX ORDER — UMECLIDINIUM 62.5 UG/1
1 AEROSOL, POWDER ORAL DAILY
COMMUNITY
Start: 2021-04-19

## 2021-05-02 RX ORDER — PANTOPRAZOLE SODIUM 40 MG/1
40 INJECTION, POWDER, FOR SOLUTION INTRAVENOUS DAILY
Status: DISCONTINUED | OUTPATIENT
Start: 2021-05-03 | End: 2021-05-03

## 2021-05-02 RX ORDER — MAGNESIUM HYDROXIDE/ALUMINUM HYDROXICE/SIMETHICONE 120; 1200; 1200 MG/30ML; MG/30ML; MG/30ML
30 SUSPENSION ORAL EVERY 6 HOURS PRN
Status: DISCONTINUED | OUTPATIENT
Start: 2021-05-02 | End: 2021-05-06 | Stop reason: HOSPADM

## 2021-05-02 RX ORDER — MORPHINE SULFATE 4 MG/ML
4 INJECTION, SOLUTION INTRAMUSCULAR; INTRAVENOUS ONCE
Status: COMPLETED | OUTPATIENT
Start: 2021-05-02 | End: 2021-05-02

## 2021-05-02 RX ORDER — ASPIRIN 81 MG/1
81 TABLET, CHEWABLE ORAL DAILY
Status: DISCONTINUED | OUTPATIENT
Start: 2021-05-03 | End: 2021-05-06 | Stop reason: HOSPADM

## 2021-05-02 RX ORDER — LEVOTHYROXINE SODIUM 0.1 MG/1
100 TABLET ORAL DAILY
COMMUNITY

## 2021-05-02 RX ORDER — HYDROMORPHONE HCL/PF 1 MG/ML
0.5 SYRINGE (ML) INJECTION ONCE
Status: COMPLETED | OUTPATIENT
Start: 2021-05-02 | End: 2021-05-02

## 2021-05-02 RX ORDER — METOCLOPRAMIDE HYDROCHLORIDE 5 MG/ML
10 INJECTION INTRAMUSCULAR; INTRAVENOUS ONCE
Status: COMPLETED | OUTPATIENT
Start: 2021-05-02 | End: 2021-05-02

## 2021-05-02 RX ORDER — ACETAMINOPHEN 325 MG/1
650 TABLET ORAL EVERY 6 HOURS PRN
Status: DISCONTINUED | OUTPATIENT
Start: 2021-05-02 | End: 2021-05-06 | Stop reason: HOSPADM

## 2021-05-02 RX ORDER — ASPIRIN 81 MG/1
81 TABLET, CHEWABLE ORAL DAILY
COMMUNITY

## 2021-05-02 RX ORDER — ALBUTEROL SULFATE 90 UG/1
2 AEROSOL, METERED RESPIRATORY (INHALATION) EVERY 6 HOURS PRN
Status: DISCONTINUED | OUTPATIENT
Start: 2021-05-02 | End: 2021-05-06 | Stop reason: HOSPADM

## 2021-05-02 RX ORDER — DICYCLOMINE HCL 20 MG
20 TABLET ORAL 2 TIMES DAILY
COMMUNITY
Start: 2021-01-11

## 2021-05-02 RX ORDER — CALCIUM CARBONATE 200(500)MG
1000 TABLET,CHEWABLE ORAL DAILY PRN
Status: DISCONTINUED | OUTPATIENT
Start: 2021-05-02 | End: 2021-05-06 | Stop reason: HOSPADM

## 2021-05-02 RX ORDER — DICYCLOMINE HCL 20 MG
20 TABLET ORAL 2 TIMES DAILY
Status: DISCONTINUED | OUTPATIENT
Start: 2021-05-03 | End: 2021-05-04

## 2021-05-02 RX ORDER — DIPHENHYDRAMINE HYDROCHLORIDE 50 MG/ML
50 INJECTION INTRAMUSCULAR; INTRAVENOUS ONCE
Status: COMPLETED | OUTPATIENT
Start: 2021-05-02 | End: 2021-05-02

## 2021-05-02 RX ORDER — CIPROFLOXACIN 2 MG/ML
400 INJECTION, SOLUTION INTRAVENOUS EVERY 12 HOURS
Status: DISCONTINUED | OUTPATIENT
Start: 2021-05-02 | End: 2021-05-03

## 2021-05-02 RX ORDER — METOCLOPRAMIDE HYDROCHLORIDE 5 MG/ML
10 INJECTION INTRAMUSCULAR; INTRAVENOUS EVERY 6 HOURS PRN
Status: DISCONTINUED | OUTPATIENT
Start: 2021-05-03 | End: 2021-05-06 | Stop reason: HOSPADM

## 2021-05-02 RX ORDER — CLOPIDOGREL BISULFATE 75 MG/1
75 TABLET ORAL DAILY
COMMUNITY
Start: 2020-12-23 | End: 2021-12-23

## 2021-05-02 RX ORDER — ONDANSETRON 2 MG/ML
4 INJECTION INTRAMUSCULAR; INTRAVENOUS ONCE
Status: COMPLETED | OUTPATIENT
Start: 2021-05-02 | End: 2021-05-02

## 2021-05-02 RX ADMIN — HYDROMORPHONE HYDROCHLORIDE 0.5 MG: 1 INJECTION, SOLUTION INTRAMUSCULAR; INTRAVENOUS; SUBCUTANEOUS at 19:58

## 2021-05-02 RX ADMIN — MORPHINE SULFATE 4 MG: 4 INJECTION INTRAVENOUS at 18:36

## 2021-05-02 RX ADMIN — DIPHENHYDRAMINE HYDROCHLORIDE 50 MG: 50 INJECTION, SOLUTION INTRAMUSCULAR; INTRAVENOUS at 18:35

## 2021-05-02 RX ADMIN — METRONIDAZOLE 500 MG: 500 INJECTION, SOLUTION INTRAVENOUS at 22:30

## 2021-05-02 RX ADMIN — FAMOTIDINE 20 MG: 10 INJECTION, SOLUTION INTRAVENOUS at 18:48

## 2021-05-02 RX ADMIN — SODIUM CHLORIDE 1000 ML: 0.9 INJECTION, SOLUTION INTRAVENOUS at 19:59

## 2021-05-02 RX ADMIN — ONDANSETRON 4 MG: 2 INJECTION INTRAMUSCULAR; INTRAVENOUS at 19:58

## 2021-05-02 RX ADMIN — CIPROFLOXACIN 400 MG: 2 INJECTION, SOLUTION INTRAVENOUS at 21:13

## 2021-05-02 RX ADMIN — METOCLOPRAMIDE HYDROCHLORIDE 10 MG: 5 INJECTION INTRAMUSCULAR; INTRAVENOUS at 18:37

## 2021-05-02 RX ADMIN — SODIUM CHLORIDE, SODIUM LACTATE, POTASSIUM CHLORIDE, AND CALCIUM CHLORIDE 1000 ML: .6; .31; .03; .02 INJECTION, SOLUTION INTRAVENOUS at 23:10

## 2021-05-02 RX ADMIN — SODIUM CHLORIDE 1000 ML: 0.9 INJECTION, SOLUTION INTRAVENOUS at 18:35

## 2021-05-03 PROBLEM — E83.42 HYPOMAGNESEMIA: Status: ACTIVE | Noted: 2021-05-03

## 2021-05-03 LAB
ANION GAP SERPL CALCULATED.3IONS-SCNC: 9 MMOL/L (ref 4–13)
ATRIAL RATE: 59 BPM
ATRIAL RATE: 69 BPM
BUN SERPL-MCNC: 10 MG/DL (ref 5–25)
CALCIUM SERPL-MCNC: 7.7 MG/DL (ref 8.3–10.1)
CHLORIDE SERPL-SCNC: 105 MMOL/L (ref 100–108)
CO2 SERPL-SCNC: 27 MMOL/L (ref 21–32)
CREAT SERPL-MCNC: 0.83 MG/DL (ref 0.6–1.3)
ERYTHROCYTE [DISTWIDTH] IN BLOOD BY AUTOMATED COUNT: 12.6 % (ref 11.6–15.1)
GFR SERPL CREATININE-BSD FRML MDRD: 100 ML/MIN/1.73SQ M
GLUCOSE P FAST SERPL-MCNC: 156 MG/DL (ref 65–99)
GLUCOSE SERPL-MCNC: 156 MG/DL (ref 65–140)
HCT VFR BLD AUTO: 37.5 % (ref 36.5–49.3)
HGB BLD-MCNC: 12.3 G/DL (ref 12–17)
MAGNESIUM SERPL-MCNC: 1.3 MG/DL (ref 1.6–2.6)
MCH RBC QN AUTO: 31.5 PG (ref 26.8–34.3)
MCHC RBC AUTO-ENTMCNC: 32.8 G/DL (ref 31.4–37.4)
MCV RBC AUTO: 96 FL (ref 82–98)
P AXIS: 55 DEGREES
P AXIS: 69 DEGREES
PHOSPHATE SERPL-MCNC: 3.4 MG/DL (ref 2.7–4.5)
PLATELET # BLD AUTO: 184 THOUSANDS/UL (ref 149–390)
PMV BLD AUTO: 9.9 FL (ref 8.9–12.7)
POTASSIUM SERPL-SCNC: 3.6 MMOL/L (ref 3.5–5.3)
PR INTERVAL: 134 MS
PR INTERVAL: 142 MS
QRS AXIS: -32 DEGREES
QRS AXIS: -50 DEGREES
QRSD INTERVAL: 100 MS
QRSD INTERVAL: 104 MS
QT INTERVAL: 382 MS
QT INTERVAL: 430 MS
QTC INTERVAL: 401 MS
QTC INTERVAL: 415 MS
RBC # BLD AUTO: 3.91 MILLION/UL (ref 3.88–5.62)
SODIUM SERPL-SCNC: 141 MMOL/L (ref 136–145)
T WAVE AXIS: 19 DEGREES
T WAVE AXIS: 2 DEGREES
TROPONIN I SERPL-MCNC: 0.08 NG/ML
VENTRICULAR RATE: 56 BPM
VENTRICULAR RATE: 66 BPM
WBC # BLD AUTO: 7.74 THOUSAND/UL (ref 4.31–10.16)

## 2021-05-03 PROCEDURE — 99222 1ST HOSP IP/OBS MODERATE 55: CPT | Performed by: INTERNAL MEDICINE

## 2021-05-03 PROCEDURE — 84100 ASSAY OF PHOSPHORUS: CPT | Performed by: FAMILY MEDICINE

## 2021-05-03 PROCEDURE — 84484 ASSAY OF TROPONIN QUANT: CPT | Performed by: FAMILY MEDICINE

## 2021-05-03 PROCEDURE — C9113 INJ PANTOPRAZOLE SODIUM, VIA: HCPCS | Performed by: FAMILY MEDICINE

## 2021-05-03 PROCEDURE — 85027 COMPLETE CBC AUTOMATED: CPT | Performed by: FAMILY MEDICINE

## 2021-05-03 PROCEDURE — 36415 COLL VENOUS BLD VENIPUNCTURE: CPT | Performed by: FAMILY MEDICINE

## 2021-05-03 PROCEDURE — 93010 ELECTROCARDIOGRAM REPORT: CPT | Performed by: INTERNAL MEDICINE

## 2021-05-03 PROCEDURE — 83735 ASSAY OF MAGNESIUM: CPT | Performed by: FAMILY MEDICINE

## 2021-05-03 PROCEDURE — 80048 BASIC METABOLIC PNL TOTAL CA: CPT | Performed by: FAMILY MEDICINE

## 2021-05-03 PROCEDURE — 99223 1ST HOSP IP/OBS HIGH 75: CPT | Performed by: INTERNAL MEDICINE

## 2021-05-03 RX ORDER — CLOPIDOGREL BISULFATE 75 MG/1
75 TABLET ORAL DAILY
Status: DISCONTINUED | OUTPATIENT
Start: 2021-05-03 | End: 2021-05-06 | Stop reason: HOSPADM

## 2021-05-03 RX ORDER — MAGNESIUM SULFATE HEPTAHYDRATE 40 MG/ML
2 INJECTION, SOLUTION INTRAVENOUS ONCE
Status: COMPLETED | OUTPATIENT
Start: 2021-05-03 | End: 2021-05-03

## 2021-05-03 RX ORDER — PANTOPRAZOLE SODIUM 40 MG/1
40 INJECTION, POWDER, FOR SOLUTION INTRAVENOUS DAILY
Status: DISCONTINUED | OUTPATIENT
Start: 2021-05-04 | End: 2021-05-04

## 2021-05-03 RX ORDER — PANTOPRAZOLE SODIUM 40 MG/1
40 INJECTION, POWDER, FOR SOLUTION INTRAVENOUS DAILY
Status: DISCONTINUED | OUTPATIENT
Start: 2021-05-03 | End: 2021-05-03

## 2021-05-03 RX ADMIN — IPRATROPIUM BROMIDE 1 PUFF: 17 AEROSOL, METERED RESPIRATORY (INHALATION) at 09:30

## 2021-05-03 RX ADMIN — DICYCLOMINE HYDROCHLORIDE 20 MG: 20 TABLET ORAL at 09:30

## 2021-05-03 RX ADMIN — ASPIRIN 81 MG: 81 TABLET, CHEWABLE ORAL at 09:30

## 2021-05-03 RX ADMIN — DICYCLOMINE HYDROCHLORIDE 20 MG: 20 TABLET ORAL at 18:19

## 2021-05-03 RX ADMIN — CLOPIDOGREL BISULFATE 75 MG: 75 TABLET ORAL at 09:30

## 2021-05-03 RX ADMIN — ANTACID TABLETS 1000 MG: 500 TABLET, CHEWABLE ORAL at 11:07

## 2021-05-03 RX ADMIN — MAGNESIUM SULFATE HEPTAHYDRATE 2 G: 40 INJECTION, SOLUTION INTRAVENOUS at 08:10

## 2021-05-03 RX ADMIN — METOCLOPRAMIDE HYDROCHLORIDE 10 MG: 5 INJECTION INTRAMUSCULAR; INTRAVENOUS at 22:35

## 2021-05-03 RX ADMIN — ANTACID TABLETS 1000 MG: 500 TABLET, CHEWABLE ORAL at 02:40

## 2021-05-03 RX ADMIN — LEVOTHYROXINE SODIUM 100 MCG: 100 TABLET ORAL at 05:20

## 2021-05-03 RX ADMIN — METOCLOPRAMIDE HYDROCHLORIDE 10 MG: 5 INJECTION INTRAMUSCULAR; INTRAVENOUS at 11:07

## 2021-05-03 RX ADMIN — ALUMINA, MAGNESIA, AND SIMETHICONE ORAL SUSPENSION REGULAR STRENGTH 30 ML: 1200; 1200; 120 SUSPENSION ORAL at 11:07

## 2021-05-03 RX ADMIN — PANTOPRAZOLE SODIUM 40 MG: 40 INJECTION, POWDER, FOR SOLUTION INTRAVENOUS at 01:20

## 2021-05-03 NOTE — ASSESSMENT & PLAN NOTE
POA   Unclear etiology at this time but some suspicion of relation to chronic THC use (last use 4/30)    S/p 2L NS, Dilaudid, Zofran, Reglan, Benadryl in ED     Improved early in the am but had returned closer to lunch time   UDS positive for opiates, THC    Plan:  · AM CBC, BMP   · Additional 1L bolus LR over 4hrs  · Bentyl and Reglan   · IV protonix 40mg   · Mylanta PRN    · Diet clears, will advance as tolerated  · GI consulted, appreciate recs

## 2021-05-03 NOTE — ASSESSMENT & PLAN NOTE
Lab Results   Component Value Date    WBC 8 79 05/02/2021     POA: most predominant in mid epigastric area  Described as "burning" 8/10  PE is not characteristic of diverticular origin - BS+ normoactive in all 4 quadrants, pain is generalized epigastric area, no tenderness lower abdomen  Patient having regular BMs w/ mild mucus and no bleeding or dark stools  Etiology: suspected to be secondary to cessation of marijuana (last use 4/30) rather than diverticulitis  Plan:   · S/p 2L NS, Dilaudid, Zofran, Reglan, Benadryl in ED   · CT A/P: "Colonic diverticulosis with minimal inflammatory changes around the sigmoid colon which may represent acute diverticulitis   If not already performed recently, colonoscopy after the acute illness is recommended to rule out possibility of colon cancer mimicking diverticulitis "     Plan:  · Received 1 dose of cipro and flagyl in ED, will d/c at this time and will continue to monitor clincially  · Serial abdominal exams  · IV PPI, bentyl and reglan  · Rpt labs in am - monitor WBC, vitals

## 2021-05-03 NOTE — ASSESSMENT & PLAN NOTE
Hx of stent placement in Dec 2019  Home med asprin 81mg daily    · Will continue ASA, plavix  · VTE: Lovenox

## 2021-05-03 NOTE — ASSESSMENT & PLAN NOTE
CT A/P: "Colonic diverticulosis with minimal inflammatory changes around the sigmoid colon which may represent acute diverticulitis   If not already performed recently, colonoscopy after the acute illness is recommended to rule out possibility of colon cancer mimicking diverticulitis "   · Follows regularly with GI - Dr Ella canada/ [facility]  · Afebrile - clinical suspicion for diverticulitis low    Plan:  · As above  · Serial abdominal exams

## 2021-05-03 NOTE — CONSULTS
Consultation - [facility]  Elizabeth Gastelum 50 y o  male MRN: [id number]  Unit/Bed#: ED 06 Encounter: [id number]        Inpatient consult to gastroenterology  Consult performed by: Danica Hope PA-C  Consult ordered by: Citlali Alaniz MD          Reason for Consult / Principal Problem:     ASSESSMENT and PLAN:      1  Nausea, vomiting  2  Epigastric pain  Patient reports 4 year history epigastric abdominal pain after eating  He presented to the ER this time due to the nausea and vomiting  He does report having a gastric emptying study in being diagnosed gastroparesis  He reports he has been admitted to the hospital 4 times in the past few years for this  He is not on any medications such as Zofran or Reglan at home but was encouraged to eat small meals  Likely secondary to gastroparesis, marijuana use,  Less likely peptic ulcer disease, gastritis  CBC and CMP unremarkable  Patient is on Plavix for stent placement 11/2020     -Continue supportive care with Zofran, Reglan   -Continue Protonix BID   -EGD can likely be performed outpatient as his vital signs are stable, no evidence of bleeding at this time  Will reasses tomorrow  3  Diverticulitis  Patient had CT abdomen and pelvis without contrast which was notable for "colonic diverticulosis with minimal inflammatory change around the sigmoid colon which may represent acute diverticulitis "  He was started on Cipro Flagyl however this has since been discontinued  Patient denies any change in bowel habits, left lower quadrant pain  Patient had a colonoscopy in 2018 with unknown results      -Patient encouraged to follow up outpatient for colonoscopy in 6-8 weeks   -Continue low residue diet as tolerated    -Patient can increase fiber intake in the next coming week     -------------------------------------------------------------------------------------------------------------------    HPI:     This is a 47-year-old male with past medical history of large B-cell lymphoma treated with chemotherapy, GERD, gastroparesis, recent stent placement on Plavix and aspirin who presented to the emergency room for nausea and vomiting  His vital signs were stable on arrival   CBC and CMP unremarkable  Troponin was slightly elevated at 0 08 on arrival   GI was consulted for nausea, vomiting  Patient reports that his symptoms have been going on for 4 years  He reports that every time he eats he gets increased burping followed by vomiting  This happens every time he eats with solids and liquids  He has followed with a GI provider at [facility] performed endoscopy 2018 which showed some inflammation but without ulcers  He does remember having a gastric emptying study test and being told to eat small meals throughout the day  Does not report any medications like Zofran or Reglan at home  He does take Bentyl 20 mg twice a day  He also takes Protonix 40 mg once a day  He reports being hospitalized 4 times for the symptoms of nausea and vomiting in the past few years  He denies any hematemesis  He takes Aleve 2 times a day for the past 2 years for arthritis  He reports 5 lb weight loss in the past week due to decreased oral intake  He reports his bowel movements are normal and with occasional constipation  He goes every other day with some straining and bloating  He had recent stent placement in November 2020 was placed on Plavix  Chart review shows there is a cardiology request to hold his Plavix which was denied  He stated he needed to be on Plavix for 6 months until around May 2021  He reports daily marijuana use for the past 4 years  He denies any alcohol use  Denies any family history of GI malignancies  Abdominal surgeries significant for hernia repair  Last endoscopy and colonoscopy were in 2018  REVIEW OF SYSTEMS:    CONSTITUTIONAL: +five pound weight loss in 1 week due to decreased appetite   Denies any fever, chills, or rigors  HEENT: No earache or tinnitus  Denies hearing loss or visual disturbances  CARDIOVASCULAR: No chest pain or palpitations  RESPIRATORY: Denies any cough, hemoptysis, shortness of breath or dyspnea on exertion  GASTROINTESTINAL: As noted in the History of Present Illness  GENITOURINARY: No problems with urination  Denies any hematuria or dysuria  NEUROLOGIC: No dizziness or vertigo, denies headaches  MUSCULOSKELETAL: Denies any muscle or joint pain  SKIN: Denies skin rashes or itching  ENDOCRINE: Denies excessive thirst  Denies intolerance to heat or cold  PSYCHOSOCIAL: Denies depression or anxiety  Denies any recent memory loss  Historical Information   No past medical history on file  No past surgical history on file  Social History   Social History     Substance and Sexual Activity   Alcohol Use None     Social History     Substance and Sexual Activity   Drug Use Yes    Types: Marijuana    Comment: last use 2 days ago     Social History     Tobacco Use   Smoking Status Never Smoker   Smokeless Tobacco Never Used     No family history on file      Meds/Allergies     (Not in a hospital admission)    Current Facility-Administered Medications   Medication Dose Route Frequency    acetaminophen (TYLENOL) tablet 650 mg  650 mg Oral Q6H PRN    albuterol (PROVENTIL HFA,VENTOLIN HFA) inhaler 2 puff  2 puff Inhalation Q6H PRN    aluminum-magnesium hydroxide-simethicone (MYLANTA) oral suspension 30 mL  30 mL Oral Q6H PRN    aspirin chewable tablet 81 mg  81 mg Oral Daily    calcium carbonate (TUMS) chewable tablet 1,000 mg  1,000 mg Oral Daily PRN    clopidogrel (PLAVIX) tablet 75 mg  75 mg Oral Daily    dicyclomine (BENTYL) tablet 20 mg  20 mg Oral BID    enoxaparin (LOVENOX) subcutaneous injection 40 mg  40 mg Subcutaneous Daily    insulin aspart (NovoLOG) FOR PUMP REFILLS 1 Units  1 Units Subcutaneous Insulin Pump Daily PRN    ipratropium (ATROVENT HFA) inhaler 1 puff  1 puff Inhalation Daily    levothyroxine tablet 100 mcg  100 mcg Oral Early Morning    metoclopramide (REGLAN) injection 10 mg  10 mg Intravenous Q6H PRN    [START ON 5/4/2021] pantoprazole (PROTONIX) injection 40 mg  40 mg Intravenous Daily    PATIENT MAINTAINED INSULIN PUMP 1 each  1 each Subcutaneous Q8H       Allergies   Allergen Reactions    Shellfish-Derived Products - Food Allergy Hives           Objective     Blood pressure 123/58, pulse 64, temperature 97 5 °F (36 4 °C), temperature source Oral, resp  rate 18, height 5' 5" (1 651 m), weight 72 6 kg (160 lb), SpO2 98 %  Intake/Output Summary (Last 24 hours) at 5/3/2021 1453  Last data filed at 5/3/2021 1020  Gross per 24 hour   Intake 3350 ml   Output --   Net 3350 ml         PHYSICAL EXAM:      General Appearance:   Alert, cooperative, no distress, appears stated age    HEENT:   Normocephalic, atraumatic, anicteric, no oropharyngeal thrush present      Neck:  Supple, symmetrical, trachea midline, no adenopathy;    thyroid: no enlargement/tenderness/nodules; no carotid  bruit or JVD    Lungs:   Clear to auscultation bilaterally; no rales, rhonchi or wheezing; respirations unlabored    Heart[de-identified]   S1 and S2 normal; regular rate and rhythm; no murmur, rub, or gallop  Abdomen:   +TTP in upper abdomen   Soft, non-distended; normal bowel sounds; no masses, no organomegaly    Genitalia:   Deferred    Rectal:   Deferred    Extremities:  No cyanosis, clubbing or edema    Pulses:  2+ and symmetric all extremities    Skin:  Skin color, texture, turgor normal, no rashes or lesions    Lymph nodes:  No palpable cervical, axillary or inguinal lymphadenopathy        Lab Results:   Results from last 7 days   Lab Units 05/03/21  0520 05/02/21  1853   WBC Thousand/uL 7 74 8 79   HEMOGLOBIN g/dL 12 3 14 1   HEMATOCRIT % 37 5 42 1   PLATELETS Thousands/uL 184 234   NEUTROS PCT %  --  86*   LYMPHS PCT %  --  9*   MONOS PCT %  --  4   EOS PCT %  --  0     Results from last 7 days   Lab Units 05/03/21  0520 05/02/21  1853   POTASSIUM mmol/L 3 6 3 6   CHLORIDE mmol/L 105 101   CO2 mmol/L 27 26   BUN mg/dL 10 10   CREATININE mg/dL 0 83 0 90   CALCIUM mg/dL 7 7* 8 9   ALK PHOS U/L  --  78   ALT U/L  --  25   AST U/L  --  21         Results from last 7 days   Lab Units 05/02/21  1853   LIPASE u/L 48*       Imaging Studies: I have personally reviewed pertinent imaging studies  Ct Abdomen Pelvis Wo Contrast    Result Date: 5/2/2021  Impression: Colonic diverticulosis with minimal inflammatory changes around the sigmoid colon which may represent acute diverticulitis  If not already performed recently, colonoscopy after the acute illness is recommended to rule out possibility of colon cancer mimicking diverticulitis  Workstation performed: [id number]     Xr Chest Portable    Result Date: 5/3/2021  Impression: No acute cardiopulmonary disease  Workstation performed: [id number]           Patient was seen and examined by Dr Anita Rivera  All fuentes medical decisions were made by Dr Anita Rivera  Thank you for allowing us to participate in the care of this present patient  We will follow-up with you closely

## 2021-05-03 NOTE — PROGRESS NOTES
[facility]  Progress Note - Donna Rg 1965, 50 y o  male MRN: [id number]  Unit/Bed#: ED 06 Encounter: [id number]  Primary Care Provider: No primary care provider on file  Date and time admitted to hospital: 5/2/2021  5:55 PM    Abdominal pain  Assessment & Plan  Lab Results   Component Value Date    WBC 7 74 05/03/2021     POA: most predominant in mid epigastric area (extends from epigsatrum to umbilicus, midline)  Described as "burning" 8/10  PE is not characteristic of diverticular origin - BS+ normoactive in all 4 quadrants, pain is generalized epigastric area, no tenderness lower abdomen  Patient having regular BMs w/ mild mucus and no bleeding or dark stools  · S/p 2L NS, Dilaudid, Zofran, Reglan, Benadryl, cipro, flagyl in ED   · CT A/P: "Colonic diverticulosis with minimal inflammatory changes around the sigmoid colon which may represent acute diverticulitis "   · DDx: cessation THC, gastroparesis complication, gastritis/enteritis, atypical diverticular presentation, DBCL sequela, GERD, ulcers    Plan:  · Received 1 dose of cipro and flagyl in ED - b/c suspicion for diverticulitis lower, will d/c at this time and will continue to monitor off abx for now  · Serial abdominal exams  · IV PPI, bentyl and reglan  · Rpt am labs - monitor WBC, vitals  · Given DCBL hx, would want to r/o sequela, and GI consulted, appreciate recs    * Intractable nausea and vomiting  Assessment & Plan  POA   Unclear etiology at this time but some suspicion of relation to chronic THC use (last use 4/30)    S/p 2L NS, Dilaudid, Zofran, Reglan, Benadryl in ED     Improved early in the am but had returned closer to lunch time   UDS positive for opiates, THC    Plan:  · AM CBC, BMP   · Additional 1L bolus LR over 4hrs  · Bentyl and Reglan   · IV protonix 40mg   · Mylanta PRN    · Diet clears, will advance as tolerated  · GI consulted, appreciate recs    Hypomagnesemia  Assessment & Plan   Monitor, replete prn    COPD (chronic obstructive pulmonary disease) (HCC)  Assessment & Plan  Hx of COPD  Home medications albuterol and incurse ellipta  Patient c/o of mild chest tightness but states he hasnt used his albuterol today to "open his lungs"   CXR WNL    Plan:  · Continue albuterol inhaler   · Atrovent ordered as incruse ellipta non formulary       Elevated troponin level not due myocardial infarction  Assessment & Plan  POA: Initial troponin 0 08  Patient w/o CP, SOB  Feels chest tightness but w/ COPD for which he uses albuterol the end Ellipta inhalers daily (had not used on admission when feeling this tightness)   Does have history of CAD s/p stent placement for which he takes aspirin and Plavix daily   EKG on admission shows NSR incomplete right bundle branch, no ST elevations or depressions different from previous EKG in September 2020   Trops stable - unlikely from MI - more likely consequence of ongoing primary conditions   CXR wnl    Plan:   · Trend troponin and repeat EKG prn   · Cont  home AspirinPlavix  · Continue to monitor patient clinically for signs and symptoms suggestive of ACS    Diverticulosis  Assessment & Plan  CT A/P: "Colonic diverticulosis with minimal inflammatory changes around the sigmoid colon which may represent acute diverticulitis  If not already performed recently, colonoscopy after the acute illness is recommended to rule out possibility of colon cancer mimicking diverticulitis "   · Follows regularly with GI - Dr Sharyn Santa w/ [facility]  · Afebrile - clinical suspicion for diverticulitis low    Plan:  · As above  · Serial abdominal exams       Hypothyroidism  Assessment & Plan  Lab Results   Component Value Date    [id number] 0 943 05/02/2021     Home med levothyroxine 100mcg  · Will continue home meds    Status post angioplasty with stent  Assessment & Plan  Hx of stent placement in Dec 2019  Home med asprin 81mg daily    · Will continue ASA, plavix  · VTE: Lovenox     Marijuana use  Assessment & Plan  POA: patient w/ hx of daily marijuana use for his anxiety/PTSD  Stopped smoking the day prior to presentation ()    Type 1 diabetes mellitus ([facility] [address] )  Assessment & Plan  Lab Results   Component Value Date    HGBA1C 7 9 (H) 2021     Blood Sugar Average: Last 72 hrs:   Patient with home insulin pump which he manages    Monitor blood sugars    VTE Pharmacologic Prophylaxis:   Pharmacologic: Enoxaparin (Lovenox)  Mechanical VTE Prophylaxis in Place: Yes  Discussions with Specialists or Other Care Team Provider: CM, nursing  Education and Discussions with Family / Patient: patient updated at bedside and agreeable to all plans  Declined offer to update family  Current Length of Stay: 0 day(s)  Current Patient Status: Observation   Discharge Plan / Estimated Discharge Date: patient requires continued IP management for the above including anti-emetics, GI consultation, possible EGD, fluid repletion  Code Status: Level 1 - Full Code    Subjective:   Patient seen and examined  No critical events overnight  Denies CP, SOB, F/C, HA, dizziness  Overall stable but still experiencing significant midline epigastric pain which radiates down to umbilicus     Objective:   Vitals:   Temp (24hrs), Av 5 °F (36 4 °C), Min:97 5 °F (36 4 °C), Max:97 5 °F (36 4 °C)    Temp:  [97 5 °F (36 4 °C)] 97 5 °F (36 4 °C)  HR:  [50-66] 64  Resp:  [16-22] 18  BP: (107-177)/(53-74) 109/53  SpO2:  [95 %-100 %] 97 %  Body mass index is 26 63 kg/m²  Input and Output Summary (last 24 hours): Intake/Output Summary (Last 24 hours) at 5/3/2021 1346  Last data filed at 5/3/2021 1020  Gross per 24 hour   Intake 3350 ml   Output --   Net 3350 ml     Physical Exam:   Physical Exam  Vitals signs reviewed  Constitutional:       General: He is not in acute distress  Appearance: Normal appearance  He is well-developed  He is ill-appearing (mildly)   He is not toxic-appearing or diaphoretic  HENT:      Head: Normocephalic and atraumatic  Right Ear: External ear normal       Left Ear: External ear normal       Nose: Nose normal  No congestion or rhinorrhea  Mouth/Throat:      Mouth: Mucous membranes are moist       Pharynx: Oropharynx is clear  Eyes:      General: No scleral icterus  Right eye: No discharge  Left eye: No discharge  Extraocular Movements: Extraocular movements intact  Conjunctiva/sclera: Conjunctivae normal    Cardiovascular:      Rate and Rhythm: Normal rate and regular rhythm  Heart sounds: Normal heart sounds  No murmur  No friction rub  No gallop  Pulmonary:      Effort: Pulmonary effort is normal  No respiratory distress  Breath sounds: Normal breath sounds  No stridor  No wheezing, rhonchi or rales  Abdominal:      General: There is no distension  Palpations: Abdomen is soft  Tenderness: There is abdominal tenderness (midline epigastric, w/ radiation down to umbilicus)  There is no guarding or rebound  Comments: Bowel sounds mildly hyperactive   Musculoskeletal:         General: No swelling or deformity  Right lower leg: No edema  Left lower leg: No edema  Skin:     General: Skin is warm and dry  Coloration: Skin is pale  Skin is not jaundiced  Neurological:      General: No focal deficit present  Mental Status: He is alert and oriented to person, place, and time  Motor: No weakness     Psychiatric:         Mood and Affect: Mood normal      Additional Data:   Labs:  Results from last 7 days   Lab Units 05/03/21  0520 05/02/21  1853   WBC Thousand/uL 7 74 8 79   HEMOGLOBIN g/dL 12 3 14 1   HEMATOCRIT % 37 5 42 1   PLATELETS Thousands/uL 184 234   NEUTROS PCT %  --  86*   LYMPHS PCT %  --  9*   MONOS PCT %  --  4   EOS PCT %  --  0     Results from last 7 days   Lab Units 05/03/21  0520 05/02/21  1853   POTASSIUM mmol/L 3 6 3 6   CHLORIDE mmol/L 105 101   CO2 mmol/L 27 26 BUN mg/dL 10 10   CREATININE mg/dL 0 83 0 90   CALCIUM mg/dL 7 7* 8 9   ALK PHOS U/L  --  78   ALT U/L  --  25   AST U/L  --  21     * I Have Reviewed All Lab Data Listed Above  * Additional Pertinent Lab Tests Reviewed: All [place] Admission Reviewed    Imaging:  Imaging Reports Reviewed Today Include: CXR, CT  Imaging Personally Reviewed by Myself Includes:  CXR, CT  XR chest portable   Final Result by Shin Tong MD (05/03 1325)      No acute cardiopulmonary disease  Workstation performed: [id number]         CT abdomen pelvis wo contrast   Final Result by Rae Sheehan DO (05/02 2025)      Colonic diverticulosis with minimal inflammatory changes around the sigmoid colon which may represent acute diverticulitis  If not already performed recently, colonoscopy after the acute illness is recommended to rule out possibility of colon cancer mimicking diverticulitis              Workstation performed: [id number]           Recent Cultures (last 7 days):       Last 24 Hours Medication List:   Current Facility-Administered Medications   Medication Dose Route Frequency Provider Last Rate    acetaminophen  650 mg Oral Q6H PRN Alee Reynoso MD      albuterol  2 puff Inhalation Q6H PRN Alee Reynoso MD      aluminum-magnesium hydroxide-simethicone  30 mL Oral Q6H PRN Alee Reynoso MD      aspirin  81 mg Oral Daily Alee Reynoso MD      calcium carbonate  1,000 mg Oral Daily PRN Alee Reynoso MD      clopidogrel  75 mg Oral Daily Alee Reynoso MD      dicyclomine  20 mg Oral BID Alee Reynoso MD      enoxaparin  40 mg Subcutaneous Daily Alee Reynoso MD      insulin aspart  1 Units Subcutaneous Insulin Pump Daily PRN Alee Reynoso MD      ipratropium  1 puff Inhalation Daily Alee Reynoso MD      levothyroxine  100 mcg Oral Early Morning Alee Reynoso MD      metoclopramide  10 mg Intravenous Q6H PRN Alee Reynoso MD      [START ON 5/4/2021] pantoprazole  40 mg Intravenous Daily Chris Sheppard PA-C      patient maintained insulin pump  1 each Subcutaneous Terri Holbrook MD       Today, Patient Was Seen By: Vanda Calixto MD    ** Please Note: This note has been constructed using a voice recognition system   **

## 2021-05-03 NOTE — QUICK NOTE
Senior Resident Supervision Note - Admission  [facility] Internal Medicine Residency    Patient Information:     Name: Lawyer Gutierres    MRN: [id number]  Age / Sex: 50 y o  male  Unit/Bed #: @DBLINK (WILMERI,99328)  @ Encounter: [id number]    Senior Resident Statement:    Patient seen and examined personally  History, assessment, and plan detailed below as well as all orders were reviewed with PGY1 resident, Dr Migue Don  I agree with the assessment and plan with the following additions / corrections:     29-year-old male presenting to emergency department with acute onset epigastric pain rated at 10/10 described as stabbing, burning, radiating to midsternum  Also having nausea and vomiting since 1 day  PMHx significant for type 2 diabetes, CAD status post stenting x3, PUD, GERD, large B-cell lymphoma, gastritis, hyperlipidemia, PTSD with regular marijuana use  Denies any prior symptoms similar to presentation  No prior history of abdominal surgery and no recent use of any medications were antibiotics  In the ED, patient was noted to have mild troponin elevation but no chest pain, only mild chest tightness  Stated that he had chicken soup earlier along with SPAM sandwich and has been having intractable vomiting ever since  History of marijuana use and prior notes have indicated that patient has been encouraged to stop/reduce use of cannabis    Patient admitted to [facility] for observation management of nausea vomiting  Ct Abdomen Pelvis Wo Contrast    Result Date: 5/2/2021  Impression: Colonic diverticulosis with minimal inflammatory changes around the sigmoid colon which may represent acute diverticulitis  If not already performed recently, colonoscopy after the acute illness is recommended to rule out possibility of colon cancer mimicking diverticulitis      Results from last 7 days   Lab Units 05/02/21  1853   SODIUM mmol/L 136   POTASSIUM mmol/L 3 6   CHLORIDE mmol/L 101   CO2 mmol/L 26 BUN mg/dL 10   CREATININE mg/dL 0 90   CALCIUM mg/dL 8 9   ALK PHOS U/L 78   ALT U/L 25   AST U/L 21     Results from last 7 days   Lab Units 05/02/21  1853   WBC Thousand/uL 8 79   HEMOGLOBIN g/dL 14 1   HEMATOCRIT % 42 1   PLATELETS Thousands/uL 234     See Resident H&P for details      Assessment/Plan: Principal Problem:    Intractable nausea and vomiting  Active Problems:    Type 1 diabetes mellitus (HCC)    Abdominal pain    Marijuana use    Status post angioplasty with stent    Hypothyroidism    Diverticulosis    Elevated troponin level not due myocardial infarction    COPD (chronic obstructive pulmonary disease) (HCC)    #Intractable Nausea Vomiting  · No LLQ abd pain on exam; questionable diverticulitis on CT but no clinical findings  · Started on abx in the ED  · Will monitor for worsening of symptoms  · Hold off abx for now and will resume if symptoms worsening  · Reglan, bentyl for symptomatic management; avoid opioids in case there's concern for gastroparesis  · IV PPI, IV Fluid hydration along with CLD, advancing diet as tolerated    #DM1  · Insulin pump management  · Regular BM, less likely diabetic gastroparesis  · Recommend outpatient follow-up endocrinology    #Troponin elevation  Recent Labs     05/02/21  1853 05/02/21  2229   TROPONINI 0 08* 0 07*       Disposition:  OBSERVATION    Expected LOS: <2 [facility],

## 2021-05-03 NOTE — ASSESSMENT & PLAN NOTE
POA: Initial troponin 0 08  Patient is not complaining of chest pain or shortness of breath  States that he feels chest tightness but does have history of COPD for which he uses albuterol the end Ellipta inhalers daily that he has not used today  Does have history of CAD s/p stent placement for which he takes aspirin and Plavix daily    EKG on admission shows NSR incomplete right bundle branch, no ST elevations or depressions different from previous EKG in September 2020    Trop: 0 07 current from 0 08 @ admission     Plan:   · Will trend troponins and EKGs, rpts pending   · F/u CXR  · Cont  home AspirinPlavix  · Continue to monitor patient clinically for signs and symptoms suggestive of ACS

## 2021-05-03 NOTE — ASSESSMENT & PLAN NOTE
POA: Initial troponin 0 08  Patient w/o CP, SOB  Feels chest tightness but w/ COPD for which he uses albuterol the end Ellipta inhalers daily (had not used on admission when feeling this tightness)   Does have history of CAD s/p stent placement for which he takes aspirin and Plavix daily     EKG on admission shows NSR incomplete right bundle branch, no ST elevations or depressions different from previous EKG in September 2020   Trops stable - unlikely from MI - more likely consequence of ongoing primary conditions   CXR wnl    Plan:   · Trend troponin and repeat EKG prn   · Cont  home AspirinPlavix  · Continue to monitor patient clinically for signs and symptoms suggestive of ACS

## 2021-05-03 NOTE — ASSESSMENT & PLAN NOTE
Hx of COPD  Home medications albuterol and incurse ellipta  Patient c/o of mild chest tightness but states he hasnt used his albuterol today to "open his lungs"     CXR WNL    Plan:  · Continue albuterol inhaler   · Atrovent ordered as incruse ellipta non formulary

## 2021-05-03 NOTE — ED PROVIDER NOTES
History  Chief Complaint   Patient presents with    Vomiting     Patient reports vomiting and abdominal pain starting yesterday  Also reports chest pain, along with SOB and cough  Got first Pfizer vaccine about 1 month ago , recent cardiac stents placed x 2 weeks ago  HPI     74-year-old male presenting to the emergency department with acute onset of stabbing burning 10/10 epigastric pain radiating to the mid sternum associated with intractable nausea and vomiting since yesterday  Past history significant for diabetes, possible gastroparesis, CAD status post 3 cardiac stent placements 11/2020, peptic ulcer disease, GERD, large B-cell lymphoma, gastritis, hyperlipidemia, PTSD  Patient is unable to complete full history given significant amount of vomiting and abdominal pain  Reports no fever, no diarrhea  Has never had pain or vomiting like this in past   No history of abdominal surgeries  No recent antibiotics  Prior to Admission Medications   Prescriptions Last Dose Informant Patient Reported? Taking?   ondansetron (ZOFRAN-ODT) 4 mg disintegrating tablet   No No   Sig: Take 1 tablet (4 mg total) by mouth every 8 (eight) hours as needed for nausea or vomiting for up to 5 days   pantoprazole (PROTONIX) 20 mg tablet   No No   Sig: Take 1 tablet (20 mg total) by mouth daily      Facility-Administered Medications: None       No past medical history on file  No past surgical history on file  No family history on file  I have reviewed and agree with the history as documented  E-Cigarette/Vaping    E-Cigarette Use Never User      E-Cigarette/Vaping Substances    Nicotine No     THC Yes     CBD No      Social History     Tobacco Use    Smoking status: Never Smoker    Smokeless tobacco: Never Used   Substance Use Topics    Alcohol use: Not on file    Drug use: Yes     Types: Marijuana     Comment: last use 2 days ago       Review of Systems   Constitutional: Positive for chills and fever  Negative for activity change  HENT: Negative for sneezing and sore throat  Eyes: Negative for pain  Respiratory: Negative for apnea, cough, choking, chest tightness, shortness of breath, wheezing and stridor  Cardiovascular: Positive for chest pain  Negative for palpitations and leg swelling  Gastrointestinal: Positive for abdominal pain, nausea and vomiting  Negative for abdominal distention, anal bleeding, blood in stool, constipation, diarrhea and rectal pain  Genitourinary: Negative for dysuria and hematuria  Musculoskeletal: Negative for back pain, gait problem, myalgias, neck pain and neck stiffness  Skin: Negative for color change, pallor, rash and wound  Neurological: Negative for dizziness, tremors, seizures, syncope, facial asymmetry, speech difficulty, weakness, light-headedness, numbness and headaches  Physical Exam  Physical Exam  Vitals signs and nursing note reviewed  Constitutional:       General: He is in acute distress  Appearance: He is well-developed  He is ill-appearing and toxic-appearing  He is not diaphoretic  Comments: Actively vomiting during examination   HENT:      Head: Normocephalic and atraumatic  Right Ear: External ear normal       Left Ear: External ear normal       Nose: Nose normal       Mouth/Throat:      Mouth: Mucous membranes are dry  Eyes:      General:         Right eye: No discharge  Left eye: No discharge  Extraocular Movements: Extraocular movements intact  Conjunctiva/sclera: Conjunctivae normal       Pupils: Pupils are equal, round, and reactive to light  Neck:      Musculoskeletal: Normal range of motion and neck supple  Cardiovascular:      Rate and Rhythm: Regular rhythm  Bradycardia present  Heart sounds: Normal heart sounds  No friction rub  No gallop  Pulmonary:      Effort: Pulmonary effort is normal  No respiratory distress  Breath sounds: Normal breath sounds  No stridor   No wheezing, rhonchi or rales  Chest:      Chest wall: No tenderness  Abdominal:      General: Bowel sounds are normal       Palpations: Abdomen is soft  Tenderness: There is abdominal tenderness  There is guarding  There is no rebound  Musculoskeletal: Normal range of motion  General: No tenderness or deformity  Skin:     General: Skin is warm and dry  Capillary Refill: Capillary refill takes less than 2 seconds  Coloration: Skin is pale  Neurological:      Mental Status: He is alert and oriented to person, place, and time           Vital Signs  ED Triage Vitals [05/02/21 1752]   Temperature Pulse Respirations Blood Pressure SpO2   97 5 °F (36 4 °C) (!) 50 22 (!) 177/74 100 %      Temp Source Heart Rate Source Patient Position - Orthostatic VS BP Location FiO2 (%)   Oral Monitor Sitting Left arm --      Pain Score       Worst Possible Pain           Vitals:    05/02/21 1752 05/02/21 1930 05/02/21 1946 05/02/21 2000   BP: (!) 177/74 136/64 133/62 126/60   Pulse: (!) 50  58 56   Patient Position - Orthostatic VS: Sitting Lying Lying Lying         Visual Acuity      ED Medications  Medications   sodium chloride 0 9 % bolus 1,000 mL (1,000 mL Intravenous New Bag 5/2/21 1959)   sodium chloride 0 9 % bolus 1,000 mL (0 mL Intravenous Stopped 5/2/21 1951)   metoclopramide (REGLAN) injection 10 mg (10 mg Intravenous Given 5/2/21 1837)   diphenhydrAMINE (BENADRYL) injection 50 mg (50 mg Intravenous Given 5/2/21 1835)   famotidine (PEPCID) injection 20 mg (20 mg Intravenous Given 5/2/21 1848)   morphine (PF) 4 mg/mL injection 4 mg (4 mg Intravenous Given 5/2/21 1836)   ondansetron (ZOFRAN) injection 4 mg (4 mg Intravenous Given 5/2/21 1958)   HYDROmorphone (DILAUDID) injection 0 5 mg (0 5 mg Intravenous Given 5/2/21 1958)       Diagnostic Studies  Results Reviewed     Procedure Component Value Units Date/Time    Troponin I repeat in 3hrs [[id number]]     Lab Status: No result Specimen: Blood TSH [[id number]]  (Normal) Collected: 05/02/21 1853    Lab Status: Final result Specimen: Blood from Arm, Right Updated: 05/02/21 1928     TSH 3RD GENERATON 0 943 uIU/mL     Narrative:      Patients undergoing fluorescein dye angiography may retain small amounts of fluorescein in the body for 48-72 hours post procedure  Samples containing fluorescein can produce falsely depressed TSH values  If the patient had this procedure,a specimen should be resubmitted post fluorescein clearance        Lipase [[id number]]  (Abnormal) Collected: 05/02/21 1853    Lab Status: Final result Specimen: Blood from Arm, Right Updated: 05/02/21 1928     Lipase 48 u/L     Troponin I [[id number]]  (Abnormal) Collected: 05/02/21 1853    Lab Status: Final result Specimen: Blood from Arm, Right Updated: 05/02/21 1921     Troponin I 0 08 ng/mL     Comprehensive metabolic panel [[id number]]  (Abnormal) Collected: 05/02/21 1853    Lab Status: Final result Specimen: Blood from Arm, Right Updated: 05/02/21 1919     Sodium 136 mmol/L      Potassium 3 6 mmol/L      Chloride 101 mmol/L      CO2 26 mmol/L      ANION GAP 9 mmol/L      BUN 10 mg/dL      Creatinine 0 90 mg/dL      Glucose 217 mg/dL      Calcium 8 9 mg/dL      AST 21 U/L      ALT 25 U/L      Alkaline Phosphatase 78 U/L      Total Protein 7 9 g/dL      Albumin 4 1 g/dL      Total Bilirubin 0 61 mg/dL      eGFR 97 ml/min/1 73sq m     Narrative:      [facility] guidelines for Chronic Kidney Disease (CKD):     Stage 1 with normal or high GFR (GFR > 90 mL/min/1 73 square meters)    Stage 2 Mild CKD (GFR = 60-89 mL/min/1 73 square meters)    Stage 3A Moderate CKD (GFR = 45-59 mL/min/1 73 square meters)    Stage 3B Moderate CKD (GFR = 30-44 mL/min/1 73 square meters)    Stage 4 Severe CKD (GFR = 15-29 mL/min/1 73 square meters)    Stage 5 End Stage CKD (GFR <15 mL/min/1 73 square meters)  Note: GFR calculation is accurate only with a steady state creatinine    CBC and differential [[id number]]  (Abnormal) Collected: 05/02/21 1853    Lab Status: Final result Specimen: Blood from Arm, Right Updated: 05/02/21 1903     WBC 8 79 Thousand/uL      RBC 4 46 Million/uL      Hemoglobin 14 1 g/dL      Hematocrit 42 1 %      MCV 94 fL      MCH 31 6 pg      MCHC 33 5 g/dL      RDW 12 1 %      MPV 10 2 fL      Platelets 850 Thousands/uL      nRBC 0 /100 WBCs      Neutrophils Relative 86 %      Immat GRANS % 1 %      Lymphocytes Relative 9 %      Monocytes Relative 4 %      Eosinophils Relative 0 %      Basophils Relative 0 %      Neutrophils Absolute 7 55 Thousands/µL      Immature Grans Absolute 0 04 Thousand/uL      Lymphocytes Absolute 0 79 Thousands/µL      Monocytes Absolute 0 38 Thousand/µL      Eosinophils Absolute 0 01 Thousand/µL      Basophils Absolute 0 02 Thousands/µL                  CT abdomen pelvis wo contrast    (Results Pending)              Procedures  Procedures         ED Course  ED Course as of May 03 2305   Sun May 02, 2021   230 50year old male presenting to the emergency department with acute onset of intractable nausea, vomiting, abdominal pain as since yesterday  Vital signs upon arrival notable for hypertension and bradycardia  Physical exam is remarkable for an acutely distressed male actively vomiting during examination, unable to complete full history due to discomfort  Abdomen soft, but tender globally on palpation, guarding, no rebound  No evidence of acute abdomen  Morphine, Benadryl, Reglan ordered for pain and nausea with 1 L of normal saline bolus  Differential diagnosis includes bowel obstruction, gastroenteritis, gastritis, peptic ulcer disease, pancreatitis, gastroparesis, ACS  Lab work and imaging was ordered  1935 Troponin I(!): 0 08 2006 Upon reassessment, patient continued to have pain and nausea  0 5 mg IV Dilaudid ordered for pain along with 4 mg IV Zofran for nausea      CBC, CMP, TSH, lipase were grossly unremarkable with exception of hyperglycemia  Troponin was noted to be elevated  Repeat troponin ordered  2007 Glucose, Random(!): 217   2007 CT abdomen pelvis pending  Patient reports significant allergy to IV contrast and so a noncontrast scan was ordered  2038 Colonic diverticulosis with minimal inflammatory changes around the sigmoid colon which may represent acute diverticulitis      If not already performed recently, colonoscopy after the acute illness is recommended to rule out possibility of colon cancer mimicking diverticulitis  CT abdomen pelvis wo contrast       Patient was admitted to general medicine for intractable n/v/abd pain and diverticulitis  Antibiotics ordered for patient  SBIRT 22yo+      Most Recent Value   SBIRT (24 yo +)   In order to provide better care to our patients, we are screening all of our patients for alcohol and drug use  Would it be okay to ask you these screening questions? Yes Filed at: 05/02/2021 1953   Initial Alcohol Screen: US AUDIT-C    1  How often do you have a drink containing alcohol?  0 Filed at: 05/02/2021 1953   2  How many drinks containing alcohol do you have on a typical day you are drinking? 0 Filed at: 05/02/2021 1953   3a  Male UNDER 65: How often do you have five or more drinks on one occasion? 0 Filed at: 05/02/2021 1953   3b  FEMALE Any Age, or MALE 65+: How often do you have 4 or more drinks on one occassion? 0 Filed at: 05/02/2021 1953   Audit-C Score  0 Filed at: 05/02/2021 1953   CRISPIN: How many times in the past year have you    Used an illegal drug or used a prescription medication for non-medical reasons? Never Filed at: 05/02/2021 1953                    MDM    Disposition  Final diagnoses:   None     ED Disposition     None      Follow-up Information    None         Patient's Medications   Discharge Prescriptions    No medications on file     No discharge procedures on file      PDMP Review     None          ED Provider  Electronically Signed by           Zoey Mar MD  05/03/21 9672

## 2021-05-03 NOTE — ASSESSMENT & PLAN NOTE
Lab Results   Component Value Date    HGBA1C 7 9 (H) 04/24/2021       No results for input(s): POCGLU in the last 72 hours      Blood Sugar Average: Last 72 hrs:     · Patient with home insulin pump which he manages

## 2021-05-03 NOTE — ED NOTES
Per pt's insulin monitor, BGM is 135  Pt laying on stretcher watching TV in negative distress       Kapil Arteaga RN  05/03/21 1885

## 2021-05-03 NOTE — ASSESSMENT & PLAN NOTE
Lab Results   Component Value Date    [id number] 0 943 05/02/2021     Home med levothyroxine 100mcg    · Will continue home meds

## 2021-05-03 NOTE — ASSESSMENT & PLAN NOTE
Lab Results   Component Value Date    WBC 7 74 05/03/2021     POA: most predominant in mid epigastric area (extends from epigsatrum to umbilicus, midline)  Described as "burning" 8/10  PE is not characteristic of diverticular origin - BS+ normoactive in all 4 quadrants, pain is generalized epigastric area, no tenderness lower abdomen  Patient having regular BMs w/ mild mucus and no bleeding or dark stools     · S/p 2L NS, Dilaudid, Zofran, Reglan, Benadryl, cipro, flagyl in ED   · CT A/P: "Colonic diverticulosis with minimal inflammatory changes around the sigmoid colon which may represent acute diverticulitis "   · DDx: cessation THC, gastroparesis complication, gastritis/enteritis, atypical diverticular presentation, DBCL sequela, GERD, ulcers    Plan:  · Received 1 dose of cipro and flagyl in ED - b/c suspicion for diverticulitis lower, will d/c at this time and will continue to monitor off abx for now  · Serial abdominal exams  · IV PPI, bentyl and reglan  · Rpt am labs - monitor WBC, vitals  · Given DCBL hx, would want to r/o sequela, and GI consulted, appreciate recs

## 2021-05-03 NOTE — H&P
[id number], 50 y o  male MRN: [id number]  Unit/Bed#: ED 06 Encounter: [id number]  Primary Care Provider: No primary care provider on file  Date and time admitted to hospital: 5/2/2021  5:55 PM    * Intractable nausea and vomiting  Assessment & Plan  Etiology: suspected to be secondary to cessation of marijuana (last use 4/30) rather than diverticulitis  S/p 2L NS, Dilaudid, Zofran, Reglan, Benadryl in ED  Plan:  · AM CBC, BMP   · UDS pending   · Additional 1L bolus LR over 4hrs  · Bentyl and Reglan   · IV protonix 40mg   · Mylanta PRN    · Diet clears, will advance as tolerated    Abdominal pain  Assessment & Plan  Lab Results   Component Value Date    WBC 8 79 05/02/2021     POA: most predominant in mid epigastric area  Described as "burning" 8/10  PE is not characteristic of diverticular origin - BS+ normoactive in all 4 quadrants, pain is generalized epigastric area, no tenderness lower abdomen  Patient having regular BMs w/ mild mucus and no bleeding or dark stools  Etiology: suspected to be secondary to cessation of marijuana (last use 4/30) rather than diverticulitis  Plan:   · S/p 2L NS, Dilaudid, Zofran, Reglan, Benadryl in ED   · CT A/P: "Colonic diverticulosis with minimal inflammatory changes around the sigmoid colon which may represent acute diverticulitis  If not already performed recently, colonoscopy after the acute illness is recommended to rule out possibility of colon cancer mimicking diverticulitis "     Plan:  · Received 1 dose of cipro and flagyl in ED, will d/c at this time and will continue to monitor clincially  · Serial abdominal exams  · IV PPI, bentyl and reglan  · Rpt labs in am - monitor WBC, vitals    Elevated troponin level not due myocardial infarction  Assessment & Plan  POA: Initial troponin 0 08  Patient is not complaining of chest pain or shortness of breath    States that he feels chest tightness but does have history of COPD for which he uses albuterol the end Ellipta inhalers daily that he has not used today  Does have history of CAD s/p stent placement for which he takes aspirin and Plavix daily  EKG on admission shows NSR incomplete right bundle branch, no ST elevations or depressions different from previous EKG in September 2020    Trop: 0 07 current from 0 08 @ admission     Plan:   · Will trend troponins and EKGs, rpts pending   · F/u CXR  · Cont  home AspirinPlavix  · Continue to monitor patient clinically for signs and symptoms suggestive of ACS    COPD (chronic obstructive pulmonary disease) ([facility])  Assessment & Plan  Hx of COPD  Home medications albuterol and incurse ellipta  Patient c/o of mild chest tightness but states he hasnt used his albuterol today to "open his lungs"  Plan:  · CXR pending   · Will continue albuterol inhaler   · Atrovent ordered as incruse ellipta non formulary       Diverticulosis  Assessment & Plan  Vitals:    05/02/21 2300   BP: 117/59   Pulse: 58   Resp: 18   Temp:    SpO2: 99%   Afebrile     CT A/P: "Colonic diverticulosis with minimal inflammatory changes around the sigmoid colon which may represent acute diverticulitis  If not already performed recently, colonoscopy after the acute illness is recommended to rule out possibility of colon cancer mimicking diverticulitis "   · See "abdominal pain"  · Follows regularly with GI  · Serial abdominal exams       Hypothyroidism  Assessment & Plan  Lab Results   Component Value Date    [id number] 0 943 05/02/2021     Home med levothyroxine 100mcg  · Will continue     Status post angioplasty with stent  Assessment & Plan  Hx of stent placement in Dec 2019  Home med asprin 81mg daily  · Will continue   · VTE: Lovenox     Marijuana use  Assessment & Plan  POA: patient w/ hx of daily marijuana use for his anxiety/PTSD   Stopped smoking the day prior to presentation (4/30)    Type 1 diabetes mellitus [facility])  Assessment & Plan  Lab Results Component Value Date    HGBA1C 7 9 (H) 04/24/2021       No results for input(s): POCGLU in the last 72 hours  Blood Sugar Average: Last 72 hrs:     · Patient with home insulin pump which he manages     VTE Prophylaxis: Enoxaparin (Lovenox)  / sequential compression device   Code Status: Level 1  POLST: POLST form is not discussed and not completed at this time  Anticipated Length of Stay:  Patient will be admitted on an Observation basis with an anticipated length of stay of  less 2 midnights  Justification for Hospital Stay: intractable n/v w/ elevated trop    Chief Complaint:   Abdominal pain nausea vomiting  History of Present Illness:    Ivet Grover is a 50 y o  male w/ past medical history of type 1 diabetes (has self manage insulin pump), gastroparesis, coronary artery disease status post stent placement December 2020 (asa and plavix), DLBL status post chemo 2016, gastritis, COPD, GERD and daily marijuana use who presents with intractable nausea and vomiting since yesterday morning along with 10/10 burning abdominal pain  Patient states that yesterday morning he ate chicken corn soup - he started to vomit profusely a few hours later but was to eat a Spam sandwich  He continued to vomit and has not stopped since  Vomit is nonbloody nonbilious  Other family members ate soup and are not ill  Of note patient smokes marijuana daily but has not smoked since 04/30  Denies travel history, denies sick contacts  In ED, vital signs remained stable, patient afebrile w/ no leukocytosis  Initial laboratory studies showed troponin of 0 08  EKG EKG showed new incomplete right bundle branch block, no acute evidence of ischemia  Patient is not having chest pain shortness of breath cough  Does report chest tightness however says this is because he has not yet used his daily albuterol to open his chest  CT in the ED showed diverticulosis with possible diverticulitis    Patient required Dilaudid Zofran Reglan and Benadryl to provide even slight relief to his symptoms  Also received 2 L of NS  Also given 1 dose of Cipro and Flagyl  Upon bedside evaluation, patient's physical exam was not indicative of diverticular origin  Pain most predominant mid epigastric area  Repeat troponin trended down 0 07    Will admit to [facility] for monitoring and observation along w/ management of intractable nausea vomiting  No immediate need for tele at this time, will continue to reassess  Review of Systems:    Review of Systems   Constitutional: Positive for fatigue  Negative for fever  Respiratory: Positive for chest tightness  Negative for cough, shortness of breath and wheezing  Cardiovascular: Negative for chest pain, palpitations and leg swelling  Gastrointestinal: Positive for abdominal pain, nausea and vomiting  Negative for blood in stool and diarrhea  Neurological: Negative for seizures and syncope  Chronic lower extremity neuropathies   All other systems reviewed and are negative  Past Medical and Surgical History:     No past medical history on file  No past surgical history on file  Meds/Allergies:    Prior to Admission medications    Medication Sig Start Date End Date Taking?  Authorizing Provider   albuterol (PROVENTIL HFA,VENTOLIN HFA) 90 mcg/act inhaler Inhale 2 puffs every 6 (six) hours as needed 10/7/20 10/7/21 Yes Historical Provider, MD   aspirin 81 mg chewable tablet Chew 81 mg daily   Yes Historical Provider, MD   levothyroxine 100 mcg tablet Take 100 mcg by mouth daily   Yes Historical Provider, MD   dicyclomine (BENTYL) 20 mg tablet Take 20 mg by mouth 2 (two) times a day 1/11/21   Historical Provider, MD   ondansetron (ZOFRAN-ODT) 4 mg disintegrating tablet Take 1 tablet (4 mg total) by mouth every 8 (eight) hours as needed for nausea or vomiting for up to 5 days 9/19/20 9/24/20  Shauna Garg MD   pantoprazole (PROTONIX) 20 mg tablet Take 1 tablet (20 mg total) by mouth daily 9/19/20 10/19/20  Tonya Traylor MD   umeclidinium bromide (Incruse Ellipta) 62 5 mcg/inh AEPB inhaler Inhale 1 Inhaler daily 4/19/21   Historical Provider, MD     I have reviewed home medications with patient personally  Allergies: Allergies   Allergen Reactions    Shellfish-Derived Products - Food Allergy Hives       Social History:     Marital Status:    Patient Pre-hospital Living Situation: w/ family  Patient Pre-hospital Level of Mobility: Mobile  Patient Pre-hospital Diet Restrictions:  None  Substance Use History:  Has not smoked in 15 years, daily marijuana use  Social History     Substance and Sexual Activity   Alcohol Use None     Social History     Tobacco Use   Smoking Status Never Smoker   Smokeless Tobacco Never Used     Social History     Substance and Sexual Activity   Drug Use Yes    Types: Marijuana    Comment: last use 2 days ago       Family History:    No family history on file  Physical Exam:     Vitals:   Blood Pressure: 117/59 (05/02/21 2300)  Pulse: 58 (05/02/21 2300)  Temperature: 97 5 °F (36 4 °C) (05/02/21 1752)  Temp Source: Oral (05/02/21 1752)  Respirations: 18 (05/02/21 2300)  Height: 5' 5" (165 1 cm) (05/02/21 1752)  Weight - Scale: 72 6 kg (160 lb) (05/02/21 1752)  SpO2: 99 % (05/02/21 2300)    Physical Exam  Vitals signs reviewed  Constitutional:       Appearance: He is not ill-appearing or toxic-appearing  Comments: Mild distress 2/2 to nausea   HENT:      Head: Normocephalic and atraumatic  Nose: Nose normal  No rhinorrhea  Mouth/Throat:      Mouth: Mucous membranes are dry  Eyes:      General: No scleral icterus  Comments: Small pupils b/l but equally reactive   Cardiovascular:      Rate and Rhythm: Normal rate and regular rhythm  Pulses: Normal pulses  Heart sounds: Normal heart sounds  No murmur  Pulmonary:      Effort: Pulmonary effort is normal  No respiratory distress        Breath sounds: Normal breath sounds  No wheezing  Abdominal:      General: Bowel sounds are normal  There is no distension  Tenderness: There is abdominal tenderness (upper abdomen , epigastric )  There is no guarding or rebound  Musculoskeletal:      Right lower leg: No edema  Left lower leg: No edema  Skin:     General: Skin is warm  Capillary Refill: Capillary refill takes less than 2 seconds  Coloration: Skin is not jaundiced  Neurological:      Mental Status: He is alert and oriented to person, place, and time  Psychiatric:         Mood and Affect: Mood normal          Behavior: Behavior normal          Additional Data:     Lab Results: I have personally reviewed pertinent reports  Results from last 7 days   Lab Units 05/02/21  1853   WBC Thousand/uL 8 79   HEMOGLOBIN g/dL 14 1   HEMATOCRIT % 42 1   PLATELETS Thousands/uL 234   NEUTROS PCT % 86*   LYMPHS PCT % 9*   MONOS PCT % 4   EOS PCT % 0     Results from last 7 days   Lab Units 05/02/21  1853   POTASSIUM mmol/L 3 6   CHLORIDE mmol/L 101   CO2 mmol/L 26   BUN mg/dL 10   CREATININE mg/dL 0 90   CALCIUM mg/dL 8 9   ALK PHOS U/L 78   ALT U/L 25   AST U/L 21           Imaging: I have personally reviewed pertinent reports  Ct Abdomen Pelvis Wo Contrast    Result Date: 5/2/2021  Narrative: CT ABDOMEN AND PELVIS WITHOUT IV CONTRAST INDICATION:   Nausea/vomiting Abdominal pain, acute, nonlocalized abd pain/n/v  COMPARISON:  CT scan dated September 17, 2020 TECHNIQUE:  CT examination of the abdomen and pelvis was performed without intravenous contrast   Axial, sagittal, and coronal 2D reformatted images were created from the source data and submitted for interpretation  Radiation dose length product (DLP) for this visit:  347 mGy-cm     This examination, like all CT scans performed in the [facility], was performed utilizing techniques to minimize radiation dose exposure, including the use of iterative reconstruction and automated exposure control  Enteric contrast was not administered  FINDINGS: Study is limited due to lack of intravenous and oral contrast to evaluate for solid abdominal organs and vascular structures ABDOMEN LOWER CHEST:  No clinically significant abnormality identified in the visualized lower chest  LIVER/BILIARY TREE:  Unremarkable  GALLBLADDER:  No calcified gallstones  No pericholecystic inflammatory change  SPLEEN:  Unremarkable  PANCREAS:  Unremarkable  ADRENAL GLANDS:  Unremarkable  KIDNEYS/URETERS:  Unremarkable  No hydronephrosis  STOMACH AND BOWEL:  Colonic diverticulosis with minimal inflammatory changes around the sigmoid colon which may represent acute diverticulitis APPENDIX:  No findings to suggest appendicitis  ABDOMINOPELVIC CAVITY:  No ascites  No pneumoperitoneum  No lymphadenopathy  VESSELS:  Unremarkable for patient's age  PELVIS REPRODUCTIVE ORGANS:  Unremarkable for patient's age  URINARY BLADDER:  Unremarkable  ABDOMINAL WALL/INGUINAL REGIONS:  Unremarkable  OSSEOUS STRUCTURES:  No acute fracture or destructive osseous lesion  Spinal degenerative changes are noted  Impression: Colonic diverticulosis with minimal inflammatory changes around the sigmoid colon which may represent acute diverticulitis  If not already performed recently, colonoscopy after the acute illness is recommended to rule out possibility of colon cancer mimicking diverticulitis  Workstation performed: [id number]       EKG, Pathology, and Other Studies Reviewed on Admission:   · EKG: NSR, LAD, incomplete RBB changed from 9/20 ekg    [facility] / Care Everywhere Records Reviewed: Yes     ** Please Note: This note has been constructed using a voice recognition system   **    Gabbie Luke MD  Family Medicine PGY-1  5/3/2021  12:11 AM

## 2021-05-03 NOTE — ASSESSMENT & PLAN NOTE
Vitals:    05/02/21 2300   BP: 117/59   Pulse: 58   Resp: 18   Temp:    SpO2: 99%   Afebrile     CT A/P: "Colonic diverticulosis with minimal inflammatory changes around the sigmoid colon which may represent acute diverticulitis   If not already performed recently, colonoscopy after the acute illness is recommended to rule out possibility of colon cancer mimicking diverticulitis "   · See "abdominal pain"  · Follows regularly with GI  · Serial abdominal exams

## 2021-05-03 NOTE — ED NOTES
Pt sleeping on stretcher with HOB elevated in negative distress  Pt awoken by voice easily, GCS 15  Pt checked BGM via own monitor: Gluse 211  VS  NSR on monitor  Negative complaints at present time       Maurilio Medina RN  05/03/21 [id number]

## 2021-05-03 NOTE — CONSULTS
Consultation - Michell Enrique 50 y o  male MRN: [id number]    Unit/Bed#: ED 06 Encounter: [id number]      Assessment/Plan   1  Type 1 diabetes mellitus on long-term insulin therapy with complications  2  Insulin pump   Poorly controlled with last A1c 7 9%  BG fairly controlled since presentation   - continue insulin pump per current settings for now   - will continue to follow and make chages as needed     3  Gastroparesis  4  Diverticulosis    - care per primary team/GI      CC: Diabetes Consult    History of Present Illness     HPI: Michell Enrique is a 50y o  year old male with Past medical history of type 1 diabetes mellitus, CAD, COPD, B-cell lymphoma status post chemotherapy in 2016,Hypothyroidism, gastroparesis, neuropathy, diabetic retinopathy,  diverticulosis who presented with intractable nausea  Endocrinology has been consulted to help with management of diabetes mellitus  Nausea, vomiting thought to be from marijuana withdrawal    Diagnosed with type 1 diabetes mellitus in 1984    He is on insulin at home  Follows up with Dr Ernie Valderrama at [facility]  Last visit 4/26/21  Using an insulin pump - Tandem with Dexcom since 5/2020   BG variable at home  Looking at dexcom, high when he says pump was interrupted  Currently  and has been flat/ stable for the last few hours  Settings   Basal  Sensitivity CIR  12 am  0 9   1:40   1:8  6am 1 1    1:30    1:4  10 pm   0 9   1:40    1: 8  Basal 24 8 units   BG target 120-130   comfortable with carbohydrate counting knows how to change pump settings       Overall feeling better  Had broth with mashed potatoes so far since presentation  He denies any polyuria, polydipsia, nocturia and blurry vision  He denies stroke but does admit to neuropathy, nephropathy and retinopathy  He denies any hypoglycemia      Inpatient consult to Endocrinology  Consult performed by: Barry Guillen MD  Consult ordered by: Bjorn Bauman MD          Review of Systems    Historical Information   No past medical history on file  No past surgical history on file  Social History   Social History     Substance and Sexual Activity   Alcohol Use None     Social History     Substance and Sexual Activity   Drug Use Yes    Types: Marijuana    Comment: last use 2 days ago     Social History     Tobacco Use   Smoking Status Never Smoker   Smokeless Tobacco Never Used     Family History: No family history on file      Meds/Allergies   Current Facility-Administered Medications   Medication Dose Route Frequency Provider Last Rate Last Admin    acetaminophen (TYLENOL) tablet 650 mg  650 mg Oral Q6H PRN Konrad De La Garza MD        albuterol (PROVENTIL HFA,VENTOLIN HFA) inhaler 2 puff  2 puff Inhalation Q6H PRN Konrad De La Garza MD        aluminum-magnesium hydroxide-simethicone (MYLANTA) oral suspension 30 mL  30 mL Oral Q6H PRN Konrad De La Garza MD   30 mL at 05/03/21 1107    aspirin chewable tablet 81 mg  81 mg Oral Daily Konrad De La Garza MD   81 mg at 05/03/21 0930    calcium carbonate (TUMS) chewable tablet 1,000 mg  1,000 mg Oral Daily PRN Konrad De La Garza MD   1,000 mg at 05/03/21 1107    clopidogrel (PLAVIX) tablet 75 mg  75 mg Oral Daily Konrad De La Garza MD   75 mg at 05/03/21 0930    dicyclomine (BENTYL) tablet 20 mg  20 mg Oral BID Konrad De La Garza MD   20 mg at 05/03/21 0930    enoxaparin (LOVENOX) subcutaneous injection 40 mg  40 mg Subcutaneous Daily Konrad De La Garza MD        insulin aspart (NovoLOG) FOR PUMP REFILLS 1 Units  1 Units Subcutaneous Insulin Pump Daily PRN Konrad De La Garza MD        ipratropium (ATROVENT HFA) inhaler 1 puff  1 puff Inhalation Daily Konrad De La Garza MD   1 puff at 05/03/21 0930    levothyroxine tablet 100 mcg  100 mcg Oral Early Morning oKnrad De La Garza MD   100 mcg at 05/03/21 0520    metoclopramide (REGLAN) injection 10 mg  10 mg Intravenous Q6H PRN Konrad De La Garza MD   10 mg at 05/03/21 1107    [START ON 5/4/2021] pantoprazole (PROTONIX) injection 40 mg 40 mg Intravenous Daily Amrit Sheppard PA-C        PATIENT MAINTAINED INSULIN PUMP 1 each  1 each Subcutaneous Q8H Bjorn Bauman MD   1 each at 05/03/21 0715     Current Outpatient Medications   Medication Sig Dispense Refill    albuterol (PROVENTIL HFA,VENTOLIN HFA) 90 mcg/act inhaler Inhale 2 puffs every 6 (six) hours as needed      aspirin 81 mg chewable tablet Chew 81 mg daily      clopidogrel (PLAVIX) 75 mg tablet Take 75 mg by mouth daily      levothyroxine 100 mcg tablet Take 100 mcg by mouth daily      dicyclomine (BENTYL) 20 mg tablet Take 20 mg by mouth 2 (two) times a day      ondansetron (ZOFRAN-ODT) 4 mg disintegrating tablet Take 1 tablet (4 mg total) by mouth every 8 (eight) hours as needed for nausea or vomiting for up to 5 days 15 tablet 0    pantoprazole (PROTONIX) 20 mg tablet Take 1 tablet (20 mg total) by mouth daily 30 tablet 0    umeclidinium bromide (Incruse Ellipta) 62 5 mcg/inh AEPB inhaler Inhale 1 Inhaler daily       Allergies   Allergen Reactions    Shellfish-Derived Products - Food Allergy Hives       Objective   Vitals: Blood pressure 123/58, pulse 64, temperature 97 5 °F (36 4 °C), temperature source Oral, resp  rate 18, height 5' 5" (1 651 m), weight 72 6 kg (160 lb), SpO2 98 %  Intake/Output Summary (Last 24 hours) at 5/3/2021 1455  Last data filed at 5/3/2021 1020  Gross per 24 hour   Intake 3350 ml   Output --   Net 3350 ml     Invasive Devices     Peripheral Intravenous Line            Peripheral IV 05/02/21 Right Forearm less than 1 day                Physical Exam  Constitutional:       General: He is not in acute distress  Appearance: He is well-developed  He is not diaphoretic  HENT:      Head: Normocephalic and atraumatic  Eyes:      Conjunctiva/sclera: Conjunctivae normal       Pupils: Pupils are equal, round, and reactive to light  Neck:      Musculoskeletal: Normal range of motion and neck supple     Cardiovascular:      Rate and Rhythm: Normal rate and regular rhythm  Heart sounds: Normal heart sounds  No murmur  Pulmonary:      Effort: Pulmonary effort is normal  No respiratory distress  Breath sounds: Normal breath sounds  No wheezing  Abdominal:      General: There is no distension  Palpations: Abdomen is soft  Tenderness: There is no abdominal tenderness  There is no guarding  Skin:     General: Skin is warm and dry  Findings: No erythema or rash  Neurological:      Mental Status: He is alert and oriented to person, place, and time  Psychiatric:         Behavior: Behavior normal          Thought Content: Thought content normal          The history was obtained from the review of the chart, patient  Lab Results:       Lab Results   Component Value Date    WBC 7 74 05/03/2021    HGB 12 3 05/03/2021    HCT 37 5 05/03/2021    MCV 96 05/03/2021     05/03/2021     Lab Results   Component Value Date/Time    BUN 10 05/03/2021 05:20 AM    BUN 12 02/21/2015 10:04 PM     02/21/2015 10:04 PM    K 3 6 05/03/2021 05:20 AM    K 3 8 02/21/2015 10:04 PM     05/03/2021 05:20 AM     02/21/2015 10:04 PM    CO2 27 05/03/2021 05:20 AM    CO2 32 (H) 02/21/2015 10:04 PM    CREATININE 0 83 05/03/2021 05:20 AM    CREATININE 1 03 02/21/2015 10:04 PM    AST 21 05/02/2021 06:53 PM    AST 28 02/21/2015 10:04 PM    ALT 25 05/02/2021 06:53 PM    ALT 51 02/21/2015 10:04 PM    ALB 4 1 05/02/2021 06:53 PM    ALB 3 8 02/21/2015 10:04 PM     No results for input(s): CHOL, HDL, LDL, TRIG, VLDL in the last 72 hours  No results found for: Liudmila Ivey  POC Glucose (mg/dl)   Date Value   09/19/2020 97   09/19/2020 131   09/18/2020 206 (H)   09/18/2020 185 (H)   09/18/2020 93   09/18/2020 127   09/17/2020 128       Imaging Studies: I have personally reviewed pertinent reports  Portions of the record may have been created with voice recognition software    Occasional wrong word or "sound a like" substitutions may have occurred due to the inherent limitations of voice recognition software  Read the chart carefully and recognize, using context, where substitutions have occurred

## 2021-05-03 NOTE — ASSESSMENT & PLAN NOTE
Hx of COPD  Home medications albuterol and incurse ellipta  Patient c/o of mild chest tightness but states he hasnt used his albuterol today to "open his lungs"      Plan:  · CXR pending   · Will continue albuterol inhaler   · Atrovent ordered as incruse ellipta non formulary

## 2021-05-03 NOTE — ASSESSMENT & PLAN NOTE
POA: patient w/ hx of daily marijuana use for his anxiety/PTSD   Stopped smoking the day prior to presentation (4/30)

## 2021-05-03 NOTE — ASSESSMENT & PLAN NOTE
Lab Results   Component Value Date    HGBA1C 7 9 (H) 04/24/2021     Blood Sugar Average: Last 72 hrs:   Patient with home insulin pump which he manages    Monitor blood sugars

## 2021-05-03 NOTE — ASSESSMENT & PLAN NOTE
Etiology: suspected to be secondary to cessation of marijuana (last use 4/30) rather than diverticulitis  S/p 2L NS, Dilaudid, Zofran, Reglan, Benadryl in ED      Plan:  · AM CBC, BMP   · UDS pending   · Additional 1L bolus LR over 4hrs  · Bentyl and Reglan   · IV protonix 40mg   · Mylanta PRN    · Diet clears, will advance as tolerated

## 2021-05-03 NOTE — ASSESSMENT & PLAN NOTE
Lab Results   Component Value Date    [id number] 0 943 05/02/2021     Home med levothyroxine 100mcg    · Will continue

## 2021-05-04 LAB
ANION GAP SERPL CALCULATED.3IONS-SCNC: 10 MMOL/L (ref 4–13)
BUN SERPL-MCNC: 8 MG/DL (ref 5–25)
CALCIUM SERPL-MCNC: 8 MG/DL (ref 8.3–10.1)
CHLORIDE SERPL-SCNC: 105 MMOL/L (ref 100–108)
CO2 SERPL-SCNC: 27 MMOL/L (ref 21–32)
CREAT SERPL-MCNC: 0.85 MG/DL (ref 0.6–1.3)
ERYTHROCYTE [DISTWIDTH] IN BLOOD BY AUTOMATED COUNT: 12.5 % (ref 11.6–15.1)
GFR SERPL CREATININE-BSD FRML MDRD: 99 ML/MIN/1.73SQ M
GLUCOSE SERPL-MCNC: 134 MG/DL (ref 65–140)
GLUCOSE SERPL-MCNC: 176 MG/DL (ref 65–140)
GLUCOSE SERPL-MCNC: 74 MG/DL (ref 65–140)
HCT VFR BLD AUTO: 40.1 % (ref 36.5–49.3)
HGB BLD-MCNC: 13.4 G/DL (ref 12–17)
MAGNESIUM SERPL-MCNC: 1.7 MG/DL (ref 1.6–2.6)
MCH RBC QN AUTO: 31.9 PG (ref 26.8–34.3)
MCHC RBC AUTO-ENTMCNC: 33.4 G/DL (ref 31.4–37.4)
MCV RBC AUTO: 96 FL (ref 82–98)
PLATELET # BLD AUTO: 192 THOUSANDS/UL (ref 149–390)
PMV BLD AUTO: 10.1 FL (ref 8.9–12.7)
POTASSIUM SERPL-SCNC: 3.6 MMOL/L (ref 3.5–5.3)
RBC # BLD AUTO: 4.2 MILLION/UL (ref 3.88–5.62)
SODIUM SERPL-SCNC: 142 MMOL/L (ref 136–145)
WBC # BLD AUTO: 7.35 THOUSAND/UL (ref 4.31–10.16)

## 2021-05-04 PROCEDURE — 82948 REAGENT STRIP/BLOOD GLUCOSE: CPT

## 2021-05-04 PROCEDURE — 80048 BASIC METABOLIC PNL TOTAL CA: CPT | Performed by: PSYCHIATRY & NEUROLOGY

## 2021-05-04 PROCEDURE — 83735 ASSAY OF MAGNESIUM: CPT | Performed by: INTERNAL MEDICINE

## 2021-05-04 PROCEDURE — 99232 SBSQ HOSP IP/OBS MODERATE 35: CPT | Performed by: INTERNAL MEDICINE

## 2021-05-04 PROCEDURE — 0DB78ZX EXCISION OF STOMACH, PYLORUS, VIA NATURAL OR ARTIFICIAL OPENING ENDOSCOPIC, DIAGNOSTIC: ICD-10-PCS | Performed by: INTERNAL MEDICINE

## 2021-05-04 PROCEDURE — 85027 COMPLETE CBC AUTOMATED: CPT | Performed by: INTERNAL MEDICINE

## 2021-05-04 PROCEDURE — C9113 INJ PANTOPRAZOLE SODIUM, VIA: HCPCS | Performed by: PSYCHIATRY & NEUROLOGY

## 2021-05-04 RX ORDER — CIPROFLOXACIN 2 MG/ML
400 INJECTION, SOLUTION INTRAVENOUS EVERY 12 HOURS
Status: DISCONTINUED | OUTPATIENT
Start: 2021-05-04 | End: 2021-05-06

## 2021-05-04 RX ORDER — LORAZEPAM 2 MG/ML
0.5 INJECTION INTRAMUSCULAR EVERY 6 HOURS PRN
Status: DISCONTINUED | OUTPATIENT
Start: 2021-05-04 | End: 2021-05-06 | Stop reason: HOSPADM

## 2021-05-04 RX ORDER — SUCRALFATE 1 G/1
1 TABLET ORAL
Status: DISCONTINUED | OUTPATIENT
Start: 2021-05-04 | End: 2021-05-06 | Stop reason: HOSPADM

## 2021-05-04 RX ORDER — METRONIDAZOLE 500 MG/1
500 TABLET ORAL EVERY 8 HOURS SCHEDULED
Status: DISCONTINUED | OUTPATIENT
Start: 2021-05-04 | End: 2021-05-06

## 2021-05-04 RX ORDER — PANTOPRAZOLE SODIUM 40 MG/1
40 INJECTION, POWDER, FOR SOLUTION INTRAVENOUS EVERY 12 HOURS SCHEDULED
Status: DISCONTINUED | OUTPATIENT
Start: 2021-05-04 | End: 2021-05-05

## 2021-05-04 RX ADMIN — SUCRALFATE 1 G: 1 TABLET ORAL at 07:42

## 2021-05-04 RX ADMIN — LORAZEPAM 0.5 MG: 2 INJECTION INTRAMUSCULAR; INTRAVENOUS at 17:04

## 2021-05-04 RX ADMIN — IPRATROPIUM BROMIDE 1 PUFF: 17 AEROSOL, METERED RESPIRATORY (INHALATION) at 11:57

## 2021-05-04 RX ADMIN — SUCRALFATE 1 G: 1 TABLET ORAL at 21:00

## 2021-05-04 RX ADMIN — METOCLOPRAMIDE HYDROCHLORIDE 10 MG: 5 INJECTION INTRAMUSCULAR; INTRAVENOUS at 08:53

## 2021-05-04 RX ADMIN — CIPROFLOXACIN 400 MG: 2 INJECTION, SOLUTION INTRAVENOUS at 20:59

## 2021-05-04 RX ADMIN — SUCRALFATE 1 G: 1 TABLET ORAL at 15:52

## 2021-05-04 RX ADMIN — METRONIDAZOLE 500 MG: 500 TABLET ORAL at 14:08

## 2021-05-04 RX ADMIN — PANTOPRAZOLE SODIUM 40 MG: 40 INJECTION, POWDER, FOR SOLUTION INTRAVENOUS at 08:59

## 2021-05-04 RX ADMIN — METRONIDAZOLE 500 MG: 500 TABLET ORAL at 21:00

## 2021-05-04 RX ADMIN — PANTOPRAZOLE SODIUM 40 MG: 40 INJECTION, POWDER, FOR SOLUTION INTRAVENOUS at 21:00

## 2021-05-04 RX ADMIN — LORAZEPAM 0.5 MG: 2 INJECTION INTRAMUSCULAR; INTRAVENOUS at 23:05

## 2021-05-04 RX ADMIN — METRONIDAZOLE 500 MG: 500 TABLET ORAL at 07:42

## 2021-05-04 RX ADMIN — ASPIRIN 81 MG: 81 TABLET, CHEWABLE ORAL at 08:58

## 2021-05-04 RX ADMIN — LEVOTHYROXINE SODIUM 100 MCG: 100 TABLET ORAL at 07:41

## 2021-05-04 RX ADMIN — SUCRALFATE 1 G: 1 TABLET ORAL at 12:36

## 2021-05-04 RX ADMIN — CIPROFLOXACIN 400 MG: 2 INJECTION, SOLUTION INTRAVENOUS at 07:44

## 2021-05-04 RX ADMIN — ENOXAPARIN SODIUM 40 MG: 40 INJECTION SUBCUTANEOUS at 08:59

## 2021-05-04 RX ADMIN — CLOPIDOGREL BISULFATE 75 MG: 75 TABLET ORAL at 08:59

## 2021-05-04 NOTE — ASSESSMENT & PLAN NOTE
CT A/P: "Colonic diverticulosis with minimal inflammatory changes around the sigmoid colon which may represent acute diverticulitis   If not already performed recently, colonoscopy after the acute illness is recommended to rule out possibility of colon cancer mimicking diverticulitis "   · Follows regularly with GI - Dr Bee canada/ [facility]  · Afebrile - clinical suspicion for diverticulitis low    Plan:  · As above  · Serial abdominal exams

## 2021-05-04 NOTE — ASSESSMENT & PLAN NOTE
Lab Results   Component Value Date    WBC 7 74 05/03/2021     POA: most predominant in mid epigastric area (extends from epigsatrum to umbilicus, midline)  Described as "burning" 8/10  PE is not characteristic of diverticular origin - BS+ normoactive in all 4 quadrants, pain is generalized epigastric area, no tenderness lower abdomen  Patient having regular BMs w/ mild mucus and no bleeding or dark stools     · S/p 2L NS, Dilaudid, Zofran, Reglan, Benadryl, cipro, flagyl in ED   · CT A/P: "Colonic diverticulosis with minimal inflammatory changes around the sigmoid colon which may represent acute diverticulitis "   · DDx: cessation THC, gastroparesis complication, gastritis/enteritis, atypical diverticular presentation, DBCL sequela, GERD, ulcers    Plan:  · Received 1 dose of cipro and flagyl in ED - b/c suspicion for diverticulitis lower, will d/c at this time and will continue to monitor off abx for now  · Serial abdominal exams  · IV PPI, bentyl and reglan - added carafate and abx per GI recommendations  · Rpt am labs - monitor WBC, vitals  · Given DCBL hx, would want to r/o sequela, and GI consulted, appreciate recs  · Patient to go for EGD in the am, NPO after midnight

## 2021-05-04 NOTE — ASSESSMENT & PLAN NOTE
Lab Results   Component Value Date    [id number] 0 943 05/02/2021     Home med levothyroxine 100mcg    · Will continue home meds

## 2021-05-04 NOTE — PROGRESS NOTES
Progress Note - Lidia Sharif 50 y o  male MRN: [id number]    Unit/Bed#: S -01 Encounter: [id number]      CC: diabetes f/u    Subjective:   Lidia Sharif is a 50y o  year old male with type 1 diabetes  Continues to have nausea, vomiting, multiple belching/repeat episodes  Vomited Jell-O that he this morning for breakfast, was unable to tolerate his liquid meal last night well  Has not been taking mealtime insulin  On review of dex com trend, noted to have multiple downward trends in blood sugars overnight  Has basal IQ ON  Objective:     Vitals: Blood pressure 125/63, pulse 68, temperature 98 2 °F (36 8 °C), temperature source Oral, resp  rate 18, height 5' 5" (1 651 m), weight 72 6 kg (160 lb), SpO2 96 %  ,Body mass index is 26 63 kg/m²  Intake/Output Summary (Last 24 hours) at 5/4/2021 1006  Last data filed at 5/3/2021 1020  Gross per 24 hour   Intake 50 ml   Output --   Net 50 ml       Physical Exam:  General Appearance: awake, appears stated age and cooperative  Head: Normocephalic, without obvious abnormality, atraumatic  Extremities: moves all extremities  Skin: Skin color and temperature normal    Pulm: no labored breathing    Lab, Imaging and other studies: I have personally reviewed pertinent reports  POC Glucose (mg/dl)   Date Value   09/19/2020 97   09/19/2020 131   09/18/2020 206 (H)   09/18/2020 185 (H)   09/18/2020 93   09/18/2020 127   09/17/2020 128       Assessment and Plan:  1  Type 1 diabetes mellitus on long-term insulin therapy with complications  2  Insulin pump therapy  3  Hypoglycemia    Recommend the following changes to basal settings ( made)   12:00 a m  0 9-->0 8 units/hr  6:00 a m  1 1  10:00 p m  0 9-->0 8 units/hr  Continue rest of current settings    Please record mealtime, bedtime, 2:00 a m  Blood sugars in patient's chart  If he takes any insulin boluses, a note should be made of that as well    4  Gastroparesis  5   Nausea, vomiting  -care per primary team, gastroenterology  Consider scheduled doses of Reglan    Portions of the record may have been created with voice recognition software  Occasional wrong word or "sound a like" substitutions may have occurred due to the inherent limitations of voice recognition software  Read the chart carefully and recognize, using context, where substitutions have occurred

## 2021-05-04 NOTE — ASSESSMENT & PLAN NOTE
POA   Unclear etiology at this time but some suspicion of relation to chronic THC use (last use 4/30)    S/p 2L NS, Dilaudid, Zofran, Reglan, Benadryl in ED     Improved early in the am but had returned closer to lunch time   UDS positive for opiates, THC    Plan:  · AM CBC, BMP   · Additional 1L bolus LR over 4hrs  · Bentyl and Reglan   · IV protonix 40mg   · Mylanta PRN    · Diet clears, will advance as tolerated  · GI consulted, appreciate recs - EGD tomorrow, NPO at midnight

## 2021-05-04 NOTE — PROGRESS NOTES
[facility]  Progress Note - Prince Cambpell 1965, 50 y o  male MRN: [id number]  Unit/Bed#: S -01 Encounter: [id number]  Primary Care Provider: No primary care provider on file  Date and time admitted to hospital: 5/2/2021  5:55 PM    Abdominal pain  Assessment & Plan  Lab Results   Component Value Date    WBC 7 74 05/03/2021     POA: most predominant in mid epigastric area (extends from epigsatrum to umbilicus, midline)  Described as "burning" 8/10  PE is not characteristic of diverticular origin - BS+ normoactive in all 4 quadrants, pain is generalized epigastric area, no tenderness lower abdomen  Patient having regular BMs w/ mild mucus and no bleeding or dark stools  · S/p 2L NS, Dilaudid, Zofran, Reglan, Benadryl, cipro, flagyl in ED   · CT A/P: "Colonic diverticulosis with minimal inflammatory changes around the sigmoid colon which may represent acute diverticulitis "   · DDx: cessation THC, gastroparesis complication, gastritis/enteritis, atypical diverticular presentation, DBCL sequela, GERD, ulcers    Plan:  · Received 1 dose of cipro and flagyl in ED - b/c suspicion for diverticulitis lower, will d/c at this time and will continue to monitor off abx for now  · Serial abdominal exams  · IV PPI, bentyl and reglan - added carafate and abx per GI recommendations  · Rpt am labs - monitor WBC, vitals  · Given DCBL hx, would want to r/o sequela, and GI consulted, appreciate recs  · Patient to go for EGD in the am, NPO after midnight    * Intractable nausea and vomiting  Assessment & Plan  POA   Unclear etiology at this time but some suspicion of relation to chronic THC use (last use 4/30)    S/p 2L NS, Dilaudid, Zofran, Reglan, Benadryl in ED     Improved early in the am but had returned closer to lunch time   UDS positive for opiates, THC    Plan:  · AM CBC, BMP   · Additional 1L bolus LR over 4hrs  · Bentyl and Reglan   · IV protonix 40mg   · Mylanta PRN    · Diet clears, will advance as tolerated  · GI consulted, appreciate recs - EGD tomorrow, NPO at midnight    Hypomagnesemia  Assessment & Plan   Monitor, replete prn    COPD (chronic obstructive pulmonary disease) ([facility] [address] )  Assessment & Plan  Hx of COPD  Home medications albuterol and incurse ellipta  Patient c/o of mild chest tightness but states he hasnt used his albuterol today to "open his lungs"   CXR WNL    Plan:  · Continue albuterol inhaler   · Atrovent ordered as incruse ellipta non formulary       Elevated troponin level not due myocardial infarction  Assessment & Plan  POA: Initial troponin 0 08  Patient w/o CP, SOB  Feels chest tightness but w/ COPD for which he uses albuterol the end Ellipta inhalers daily (had not used on admission when feeling this tightness)   Does have history of CAD s/p stent placement for which he takes aspirin and Plavix daily   EKG on admission shows NSR incomplete right bundle branch, no ST elevations or depressions different from previous EKG in September 2020   Trops stable - unlikely from MI - more likely consequence of ongoing primary conditions   CXR wnl    Plan:   · Trend troponin and repeat EKG prn   · Cont  home AspirinPlavix  · Continue to monitor patient clinically for signs and symptoms suggestive of ACS    Diverticulosis  Assessment & Plan  CT A/P: "Colonic diverticulosis with minimal inflammatory changes around the sigmoid colon which may represent acute diverticulitis  If not already performed recently, colonoscopy after the acute illness is recommended to rule out possibility of colon cancer mimicking diverticulitis "   · Follows regularly with GI - Dr Graham Don w/ [facility]  · Afebrile - clinical suspicion for diverticulitis low    Plan:  · As above  · Serial abdominal exams       Hypothyroidism  Assessment & Plan  Lab Results   Component Value Date    [id number] 0 943 05/02/2021     Home med levothyroxine 100mcg    · Will continue home meds    Status post angioplasty with stent  Assessment & Plan  Hx of stent placement in Dec 2019  Home med asprin 81mg daily  · Will continue ASA, plavix  · VTE: Lovenox     Marijuana use  Assessment & Plan  POA: patient w/ hx of daily marijuana use for his anxiety/PTSD  Stopped smoking the day prior to presentation ()    Type 1 diabetes mellitus ([facility] [address] )  Assessment & Plan  Lab Results   Component Value Date    HGBA1C 7 9 (H) 2021     Blood Sugar Average: Last 72 hrs:   Patient with home insulin pump which he manages    Monitor blood sugars    VTE Pharmacologic Prophylaxis:   Pharmacologic: Enoxaparin (Lovenox)  Mechanical VTE Prophylaxis in Place: Yes  Discussions with Specialists or Other Care Team Provider: GI, CM, nursing  Education and Discussions with Family / Patient: patient updated at bedside  Declined family update  Current Length of Stay: 1 day(s)  Current Patient Status: Inpatient   Discharge Plan / Estimated Discharge Date: patient requires continued IP management for the above but will likely be stable for d/c w/in 48 hrs  Code Status: Level 1 - Full Code    Subjective:   Patient seen and examined  No critical events overnight  Denies CP, SOB, F/C, HA, dizziness  Overall stable but still w/ abdominal pain and nausea but no recent vomiting    Objective:   Vitals:   Temp (24hrs), Av 6 °F (37 °C), Min:98 2 °F (36 8 °C), Max:98 8 °F (37 1 °C)    Temp:  [98 2 °F (36 8 °C)-98 8 °F (37 1 °C)] 98 2 °F (36 8 °C)  HR:  [58-69] 68  Resp:  [18] 18  BP: (115-151)/(58-71) 125/63  SpO2:  [94 %-99 %] 96 %  Body mass index is 26 63 kg/m²  Input and Output Summary (last 24 hours):   No intake or output data in the 24 hours ending 21 1354    Physical Exam:   Physical Exam  Vitals signs reviewed  Constitutional:       General: He is not in acute distress  Appearance: Normal appearance  He is well-developed  He is not ill-appearing, toxic-appearing or diaphoretic     HENT:      Head: Normocephalic and atraumatic  Right Ear: External ear normal       Left Ear: External ear normal       Nose: Nose normal  No congestion or rhinorrhea  Mouth/Throat:      Mouth: Mucous membranes are moist       Pharynx: Oropharynx is clear  Eyes:      General: No scleral icterus  Right eye: No discharge  Left eye: No discharge  Extraocular Movements: Extraocular movements intact  Conjunctiva/sclera: Conjunctivae normal    Cardiovascular:      Rate and Rhythm: Normal rate and regular rhythm  Heart sounds: Normal heart sounds  No murmur  No friction rub  No gallop  Pulmonary:      Effort: Pulmonary effort is normal  No respiratory distress  Breath sounds: Normal breath sounds  No stridor  No wheezing, rhonchi or rales  Abdominal:      General: Bowel sounds are normal  There is no distension  Palpations: Abdomen is soft  Tenderness: There is abdominal tenderness (midline umbilicus to epigastric region)  There is no guarding or rebound  Comments: Insulin pump   Musculoskeletal:         General: No swelling or deformity  Right lower leg: No edema  Left lower leg: No edema  Skin:     General: Skin is warm and dry  Coloration: Skin is not jaundiced or pale  Neurological:      General: No focal deficit present  Mental Status: He is alert and oriented to person, place, and time  Cranial Nerves: No cranial nerve deficit  Motor: No weakness  Psychiatric:         Mood and Affect: Mood normal          Behavior: Behavior normal      Additional Data:   Labs:  Results from last 7 days   Lab Units 05/04/21  0537  05/02/21  1853   WBC Thousand/uL 7 35   < > 8 79   HEMOGLOBIN g/dL 13 4   < > 14 1   HEMATOCRIT % 40 1   < > 42 1   PLATELETS Thousands/uL 192   < > 234   NEUTROS PCT %  --   --  86*   LYMPHS PCT %  --   --  9*   MONOS PCT %  --   --  4   EOS PCT %  --   --  0    < > = values in this interval not displayed       Results from last 7 days   Lab Units 05/04/21  0537  05/02/21  1853   POTASSIUM mmol/L 3 6   < > 3 6   CHLORIDE mmol/L 105   < > 101   CO2 mmol/L 27   < > 26   BUN mg/dL 8   < > 10   CREATININE mg/dL 0 85   < > 0 90   CALCIUM mg/dL 8 0*   < > 8 9   ALK PHOS U/L  --   --  78   ALT U/L  --   --  25   AST U/L  --   --  21    < > = values in this interval not displayed  * I Have Reviewed All Lab Data Listed Above  * Additional Pertinent Lab Tests Reviewed: [facility]    Imaging:  Imaging Reports Reviewed Today Include: CXR, CT  Imaging Personally Reviewed by Myself Includes:  CXR  XR chest portable   Final Result by Jeri Rosales MD (05/03 1325)      No acute cardiopulmonary disease  Workstation performed: [id number]         CT abdomen pelvis wo contrast   Final Result by Ramin Cross DO (05/02 2025)      Colonic diverticulosis with minimal inflammatory changes around the sigmoid colon which may represent acute diverticulitis  If not already performed recently, colonoscopy after the acute illness is recommended to rule out possibility of colon cancer mimicking diverticulitis              Workstation performed: [id number]           Recent Cultures (last 7 days):       Last 24 Hours Medication List:   Current Facility-Administered Medications   Medication Dose Route Frequency Provider Last Rate    acetaminophen  650 mg Oral Q6H PRN Janelle Roberto MD      albuterol  2 puff Inhalation Q6H PRN Janelle Roberto MD      aluminum-magnesium hydroxide-simethicone  30 mL Oral Q6H PRN Janelle Roberto MD      aspirin  81 mg Oral Daily Janelle Roberto MD      calcium carbonate  1,000 mg Oral Daily PRN Janelle Roberto MD      ciprofloxacin  400 mg Intravenous Q12H Domenic Wyatt  mg (05/04/21 0744)    clopidogrel  75 mg Oral Daily Janelle Roberto MD      enoxaparin  40 mg Subcutaneous Daily Janelle Roberto MD      insulin aspart  1 Units Subcutaneous Insulin Pump Daily PRN MD Ori Ardon ipratropium  1 puff Inhalation Daily Cesia Otero MD      levothyroxine  100 mcg Oral Early Morning Cesia Otero MD      metoclopramide  10 mg Intravenous Q6H PRN Cesia Otero MD      metroNIDAZOLE  500 mg Oral [facility] Sharon Olson MD      pantoprazole  40 mg Intravenous Q12H [address] Sharon Olson MD      patient maintained insulin pump  1 each Subcutaneous Q8H Cesia Otero MD      sucralfate  1 g Oral 4x Daily ([facility]) Sharon Olson MD          Today, Patient Was Seen By: Sharon Olson MD    ** Please Note: This note has been constructed using a voice recognition system   **

## 2021-05-04 NOTE — PROGRESS NOTES
Progress Note - Thuy De La Vega 50 y o  male MRN: [id number]    Unit/Bed#: S -01 Encounter: [id number]    Assessment and Plan:     1  Nausea, vomiting  2  Epigastric pain  Patient reports long history of nausea vomiting after meals likely in the setting of gastroparesis  However he reports that this pain, nausea, vomiting is worse than usual   He does not report any relief with medications given such as Carafate, PPI, and Reglan  He was unable to tolerate broth last night and had an episode of vomiting after  This morning he also vomited up Jell-O shortly after ingesting  Concern for possible ulcer possible in the setting of daily Aleve use, gastritis  Patient is on Plavix for recent stent placement     -Plan for EGD tomorrow if no symptom improvement today  NPO after midnight   -Continue diet as tolerated today   -Continue supportive measures with Carafate, ppi, Reglan  Consider changing Reglan as needed to scheduled for today  3  Possible diverticulitis  Noted on CT scan with mild inflammatory changes     -Recommend continuing antibiotics  -Recommend outpatient colonoscopy  -Can continue clear liquid diet for today     ----------------------------------------------------------------------------------------------------------------    Subjective:     Patient reports he is not doing that well  He continues to have mid epigastric burning pain as well as nausea and vomiting with meals  He reports that yesterday he was unable to tolerate broth at night and vomited this up  He also reports having Jell-O this morning with pain starting around 5 minutes after ingestion and vomiting occurring about 1/2 hour later  He denies any melena and last bowel movement yesterday  He is passing gas  Patient reports no improvement with medications Iike Carafate, ppi or Reglan  Objective:     Vitals: Blood pressure 125/63, pulse 68, temperature 98 2 °F (36 8 °C), temperature source Oral, resp   rate 18, height 5' 5" (1 651 m), weight 72 6 kg (160 lb), SpO2 96 %  ,Body mass index is 26 63 kg/m²  No intake or output data in the 24 hours ending 05/04/21 1258    Physical Exam:     General Appearance: Alert, appears stated age and cooperative  Lungs: Clear to auscultation bilaterally, no rales or rhonchi, no labored breathing/accessory muscle use  Heart: Regular rate and rhythm, S1, S2 normal, no murmur, click, rub or gallop  Abdomen: +TTP in epigastric area  Soft, non-distended; bowel sounds normal; no masses or no organomegaly  Extremities: No cyanosis, clubbing, or edema    Invasive Devices     Peripheral Intravenous Line            Peripheral IV 05/02/21 Right Forearm 1 day                Lab Results:  Results from last 7 days   Lab Units 05/04/21 0537 05/02/21  1853   WBC Thousand/uL 7 35   < > 8 79   HEMOGLOBIN g/dL 13 4   < > 14 1   HEMATOCRIT % 40 1   < > 42 1   PLATELETS Thousands/uL 192   < > 234   NEUTROS PCT %  --   --  86*   LYMPHS PCT %  --   --  9*   MONOS PCT %  --   --  4   EOS PCT %  --   --  0    < > = values in this interval not displayed  Results from last 7 days   Lab Units 05/04/21 0537  05/02/21  1853   POTASSIUM mmol/L 3 6   < > 3 6   CHLORIDE mmol/L 105   < > 101   CO2 mmol/L 27   < > 26   BUN mg/dL 8   < > 10   CREATININE mg/dL 0 85   < > 0 90   CALCIUM mg/dL 8 0*   < > 8 9   ALK PHOS U/L  --   --  78   ALT U/L  --   --  25   AST U/L  --   --  21    < > = values in this interval not displayed  Invalid input(s): BILI      Results from last 7 days   Lab Units 05/02/21  1853   LIPASE u/L 48*       Imaging Studies: I have personally reviewed pertinent imaging studies  Ct Abdomen Pelvis Wo Contrast    Result Date: 5/2/2021  Impression: Colonic diverticulosis with minimal inflammatory changes around the sigmoid colon which may represent acute diverticulitis   If not already performed recently, colonoscopy after the acute illness is recommended to rule out possibility of colon cancer mimicking diverticulitis  Workstation performed: [id number]     Xr Chest Portable    Result Date: 5/3/2021  Impression: No acute cardiopulmonary disease   Workstation performed: [id number]

## 2021-05-05 ENCOUNTER — ANESTHESIA (INPATIENT)
Dept: GASTROENTEROLOGY | Facility: HOSPITAL | Age: 56
DRG: 392 | End: 2021-05-05
Payer: MEDICARE

## 2021-05-05 ENCOUNTER — APPOINTMENT (INPATIENT)
Dept: GASTROENTEROLOGY | Facility: HOSPITAL | Age: 56
DRG: 392 | End: 2021-05-05
Payer: MEDICARE

## 2021-05-05 ENCOUNTER — ANESTHESIA EVENT (INPATIENT)
Dept: GASTROENTEROLOGY | Facility: HOSPITAL | Age: 56
DRG: 392 | End: 2021-05-05
Payer: MEDICARE

## 2021-05-05 LAB
GLUCOSE SERPL-MCNC: 104 MG/DL (ref 65–140)
GLUCOSE SERPL-MCNC: 108 MG/DL (ref 65–140)
GLUCOSE SERPL-MCNC: 130 MG/DL (ref 65–140)
GLUCOSE SERPL-MCNC: 61 MG/DL (ref 65–140)
GLUCOSE SERPL-MCNC: 72 MG/DL (ref 65–140)
GLUCOSE SERPL-MCNC: 96 MG/DL (ref 65–140)

## 2021-05-05 PROCEDURE — 99232 SBSQ HOSP IP/OBS MODERATE 35: CPT | Performed by: INTERNAL MEDICINE

## 2021-05-05 PROCEDURE — 43239 EGD BIOPSY SINGLE/MULTIPLE: CPT | Performed by: INTERNAL MEDICINE

## 2021-05-05 PROCEDURE — 88305 TISSUE EXAM BY PATHOLOGIST: CPT | Performed by: PATHOLOGY

## 2021-05-05 PROCEDURE — 82948 REAGENT STRIP/BLOOD GLUCOSE: CPT

## 2021-05-05 PROCEDURE — C9113 INJ PANTOPRAZOLE SODIUM, VIA: HCPCS | Performed by: PSYCHIATRY & NEUROLOGY

## 2021-05-05 RX ORDER — LIDOCAINE HYDROCHLORIDE 10 MG/ML
INJECTION, SOLUTION EPIDURAL; INFILTRATION; INTRACAUDAL; PERINEURAL AS NEEDED
Status: DISCONTINUED | OUTPATIENT
Start: 2021-05-05 | End: 2021-05-05

## 2021-05-05 RX ORDER — PROPOFOL 10 MG/ML
INJECTION, EMULSION INTRAVENOUS AS NEEDED
Status: DISCONTINUED | OUTPATIENT
Start: 2021-05-05 | End: 2021-05-05

## 2021-05-05 RX ORDER — SODIUM CHLORIDE 9 MG/ML
100 INJECTION, SOLUTION INTRAVENOUS CONTINUOUS
Status: DISCONTINUED | OUTPATIENT
Start: 2021-05-05 | End: 2021-05-06

## 2021-05-05 RX ORDER — PANTOPRAZOLE SODIUM 40 MG/1
40 TABLET, DELAYED RELEASE ORAL
Status: DISCONTINUED | OUTPATIENT
Start: 2021-05-05 | End: 2021-05-06 | Stop reason: HOSPADM

## 2021-05-05 RX ORDER — DEXTROSE MONOHYDRATE 25 G/50ML
INJECTION, SOLUTION INTRAVENOUS AS NEEDED
Status: DISCONTINUED | OUTPATIENT
Start: 2021-05-05 | End: 2021-05-05

## 2021-05-05 RX ADMIN — DEXTROSE MONOHYDRATE 20 ML: 500 INJECTION PARENTERAL at 15:08

## 2021-05-05 RX ADMIN — CIPROFLOXACIN 400 MG: 2 INJECTION, SOLUTION INTRAVENOUS at 06:15

## 2021-05-05 RX ADMIN — SODIUM CHLORIDE 100 ML/HR: 0.9 INJECTION, SOLUTION INTRAVENOUS at 09:23

## 2021-05-05 RX ADMIN — CLOPIDOGREL BISULFATE 75 MG: 75 TABLET ORAL at 10:01

## 2021-05-05 RX ADMIN — METRONIDAZOLE 500 MG: 500 TABLET ORAL at 05:45

## 2021-05-05 RX ADMIN — PROPOFOL 50 MG: 10 INJECTION, EMULSION INTRAVENOUS at 15:13

## 2021-05-05 RX ADMIN — PANTOPRAZOLE SODIUM 40 MG: 40 TABLET, DELAYED RELEASE ORAL at 16:06

## 2021-05-05 RX ADMIN — SUCRALFATE 1 G: 1 TABLET ORAL at 16:06

## 2021-05-05 RX ADMIN — LIDOCAINE HYDROCHLORIDE 50 MG: 10 INJECTION, SOLUTION EPIDURAL; INFILTRATION; INTRACAUDAL at 15:11

## 2021-05-05 RX ADMIN — ASPIRIN 81 MG: 81 TABLET, CHEWABLE ORAL at 10:02

## 2021-05-05 RX ADMIN — LORAZEPAM 0.5 MG: 2 INJECTION INTRAMUSCULAR; INTRAVENOUS at 05:42

## 2021-05-05 RX ADMIN — SUCRALFATE 1 G: 1 TABLET ORAL at 06:15

## 2021-05-05 RX ADMIN — PROPOFOL 150 MG: 10 INJECTION, EMULSION INTRAVENOUS at 15:11

## 2021-05-05 RX ADMIN — METRONIDAZOLE 500 MG: 500 TABLET ORAL at 21:39

## 2021-05-05 RX ADMIN — METRONIDAZOLE 500 MG: 500 TABLET ORAL at 16:06

## 2021-05-05 RX ADMIN — LORAZEPAM 0.5 MG: 2 INJECTION INTRAMUSCULAR; INTRAVENOUS at 21:39

## 2021-05-05 RX ADMIN — IPRATROPIUM BROMIDE 1 PUFF: 17 AEROSOL, METERED RESPIRATORY (INHALATION) at 09:16

## 2021-05-05 RX ADMIN — LEVOTHYROXINE SODIUM 100 MCG: 100 TABLET ORAL at 05:45

## 2021-05-05 RX ADMIN — PANTOPRAZOLE SODIUM 40 MG: 40 INJECTION, POWDER, FOR SOLUTION INTRAVENOUS at 09:15

## 2021-05-05 RX ADMIN — CIPROFLOXACIN 400 MG: 2 INJECTION, SOLUTION INTRAVENOUS at 20:18

## 2021-05-05 RX ADMIN — PROPOFOL 50 MG: 10 INJECTION, EMULSION INTRAVENOUS at 15:12

## 2021-05-05 RX ADMIN — SUCRALFATE 1 G: 1 TABLET ORAL at 21:39

## 2021-05-05 RX ADMIN — ENOXAPARIN SODIUM 40 MG: 40 INJECTION SUBCUTANEOUS at 10:01

## 2021-05-05 NOTE — ASSESSMENT & PLAN NOTE
POA: Initial troponin 0 08  Patient w/o CP, SOB  Feels chest tightness but w/ COPD for which he uses albuterol the end Ellipta inhalers daily (had not used on admission when feeling this tightness)   Does have history of CAD s/p stent placement for which he takes aspirin and Plavix daily     EKG on admission shows NSR incomplete right bundle branch, no ST elevations or depressions different from previous EKG in September 2020   Trops stable - unlikely from MI - more likely consequence of ongoing primary conditions   CXR wnl    Plan  · Cont  home AspirinPlavix

## 2021-05-05 NOTE — ASSESSMENT & PLAN NOTE
POA: patient w/ hx of daily marijuana use for his anxiety/PTSD   Stopped smoking the day prior to presentation (4/30)   Substituted low dose prn ativan which patient said worked well

## 2021-05-05 NOTE — DISCHARGE INSTR - AVS FIRST PAGE
Dear Alberto Dent,     It was our pleasure to care for you here at [facility] [place]  It is our hope that we were always able to exceed the expected standards for your care during your stay  You were hospitalized due to mild gastritis/mild diverticulitis  You were cared for on the 4th floor by Baldo Frazier MD under the service of Annmarie Loyd MD with the [facility] who covers for your primary care physician (PCP), No primary care provider on file  , while you were hospitalized  If you have any questions or concerns related to this hospitalization, you may contact us at [phone]  For follow up as well as any medication refills, we recommend that you follow up with your primary care physician  A registered nurse will reach out to you by phone within a few days after your discharge to answer any additional questions that you may have after going home  However, at this time we provide for you here, the most important instructions / recommendations at discharge:     · Notable Medication Adjustments -   · Protonix 40 mg p o   Twice a day, talk to your doctor or GI doctor for how long he should be taking this medication  · Carafate 4 times a day,  talk to your doctor or GI doctor for how long he should be taking this medication  · Ciprofloxacin 500 mg every 12 hours, and metronidazole 500 mg every 8 hours for additional 4 days  · Testing Required after Discharge -   · Follow-up biopsy results with GI (Dr Azeb Garg)  · You will need an outpatient colonoscopy per GI doctor discuss this with your gastroenterologist, we also provided number from our gastroenterologist 46 to follow-up with [facility]  · Important follow up information -   · Follow-up with your primary physician in 1 week  · Follow-up with Endocrinology  · Follow-up with gastroenterology  · Other Instructions -   · Low residue diet   · Please review this entire after visit summary as additional general instructions including medication list, appointments, activity, diet, any pertinent wound care, and other additional recommendations from your care team that may be provided for you        Sincerely,     Citlali Alaniz MD

## 2021-05-05 NOTE — ASSESSMENT & PLAN NOTE
POA   Unclear etiology at this time but some suspicion of relation to chronic THC use (last use 4/30)    S/p 2L NS, Dilaudid, Zofran, Reglan, Benadryl in ED   Improved early in the am but had returned closer to lunch time   UDS positive for opiates, THC    Plan:  · Zofran and Reglan p r n

## 2021-05-05 NOTE — ASSESSMENT & PLAN NOTE
CT A/P: "Colonic diverticulosis with minimal inflammatory changes around the sigmoid colon which may represent acute diverticulitis   If not already performed recently, colonoscopy after the acute illness is recommended to rule out possibility of colon cancer mimicking diverticulitis "   · Follows regularly with GI - Dr Yessi canada/ [facility]  · Afebrile - clinical suspicion for diverticulitis low    Plan:  · As above  · Serial abdominal exams

## 2021-05-05 NOTE — ASSESSMENT & PLAN NOTE
POA   Unclear etiology at this time but some suspicion of relation to chronic THC use (last use 4/30)    S/p 2L NS, Dilaudid, Zofran, Reglan, Benadryl in ED     Improved early in the am but had returned closer to lunch time   UDS positive for opiates, THC    Plan:  · AM CBC, BMP   · Additional 1L bolus LR over 4hrs  · Bentyl and Reglan   · IV protonix 40mg   · Mylanta PRN    · Diet clears, will advance as tolerated  · GI consulted, appreciate recs - EGD this afternoon

## 2021-05-05 NOTE — PROGRESS NOTES
Progress Note - Ebenezer Cyr 50 y o  male MRN: [id number]    Unit/Bed#: S -01 Encounter: [id number]      CC: diabetes f/u    Subjective:   Ebenezer Cyr is a 50y o  year old male with type 1 diabetes  Patient was NPO for EGD today, will be allowed to eat now, diet is being advanced  Had hypoglycemia to 61 mg/dL early afternoon  Did not have any symptoms  On review of dex com blood sugar trends, blood sugar trended down starting around 8:00 a m , very tightly controlled until hypoglycemia was corrected  No nausea, vomiting at this time      Objective:     Vitals: Blood pressure 121/66, pulse 58, temperature 98 °F (36 7 °C), temperature source Oral, resp  rate 22, height 5' 5" (1 651 m), weight 72 6 kg (160 lb), SpO2 98 %  ,Body mass index is 26 63 kg/m²  Intake/Output Summary (Last 24 hours) at 5/5/2021 1609  Last data filed at 5/5/2021 1526  Gross per 24 hour   Intake 200 ml   Output 0 ml   Net 200 ml       Physical Exam:  General Appearance: awake, appears stated age and cooperative  Head: Normocephalic, without obvious abnormality, atraumatic  Extremities: moves all extremities  Skin: Skin color and temperature normal    Pulm: no labored breathing    Lab, Imaging and other studies: I have personally reviewed pertinent reports  POC Glucose (mg/dl)   Date Value   05/05/2021 96   05/05/2021 130   05/05/2021 61 (L)   05/05/2021 104   05/05/2021 108   05/04/2021 134   05/04/2021 176 (H)   09/19/2020 97   09/19/2020 131   09/18/2020 206 (H)       Assessment and Plan:  1  Type 1 diabetes mellitus on long-term insulin therapy  2  Hypoglycemia, hyperglycemia number  3  Insulin pump therapy  - decrease basal insulin starting at 6:00 a m  from 1 1--> 1 0 units/hr   - continue rest of current settings  Will continue following changes as needed    4  Gastroparesis  -care per primary team/gastroenterology    Portions of the record may have been created with voice recognition software    Occasional wrong word or "sound a like" substitutions may have occurred due to the inherent limitations of voice recognition software  Read the chart carefully and recognize, using context, where substitutions have occurred

## 2021-05-05 NOTE — ASSESSMENT & PLAN NOTE
Lab Results   Component Value Date    WBC 7 35 05/04/2021     POA: most predominant in mid epigastric area (extends from epigsatrum to umbilicus, midline)  Described as "burning" 8/10  PE is not characteristic of diverticular origin - BS+ normoactive in all 4 quadrants, pain is generalized epigastric area, no tenderness lower abdomen  Patient having regular BMs w/ mild mucus and no bleeding or dark stools     · S/p 2L NS, Dilaudid, Zofran, Reglan, Benadryl, cipro, flagyl in ED   · CT A/P: "Colonic diverticulosis with minimal inflammatory changes around the sigmoid colon which may represent acute diverticulitis "   · DDx: cessation THC, gastroparesis complication, gastritis/enteritis, atypical diverticular presentation, DBCL sequela, GERD, ulcers    Plan:  · Received 1 dose of cipro and flagyl in ED - b/c suspicion for diverticulitis lower, will d/c at this time and will continue to monitor off abx for now  · Serial abdominal exams  · IV PPI, bentyl and reglan - added carafate and abx per GI recommendations  · Rpt am labs - monitor WBC, vitals  · Given DCBL hx, would want to r/o sequela, and GI consulted, appreciate recs  · Patient to go for EGD @ 1300

## 2021-05-05 NOTE — ASSESSMENT & PLAN NOTE
Lab Results   Component Value Date    WBC 7 35 05/04/2021     POA: most predominant in mid epigastric area (extends from epigsatrum to umbilicus, midline)  Described as "burning" 8/10  PE is not characteristic of diverticular origin - BS+ normoactive in all 4 quadrants, pain is generalized epigastric area, no tenderness lower abdomen  Patient having regular BMs w/ mild mucus and no bleeding or dark stools     · S/p 2L NS, Dilaudid, Zofran, Reglan, Benadryl, cipro, flagyl in ED   · CT A/P: "Colonic diverticulosis with minimal inflammatory changes around the sigmoid colon which may represent acute diverticulitis "   · DDx: cessation THC, gastroparesis complication, gastritis/enteritis, atypical diverticular presentation, DBCL sequela, GERD, ulcers    5/5 EGD:  · The duodenum appeared normal   · Mild erythematous mucosa in the antrum; performed cold forceps biopsy  · The cardia appeared normal   · The esophagus appeared normal        Plan:  · Received 1 dose of cipro and flagyl in ED - b/c suspicion for diverticulitis   · Serial abdominal exams performed  · PPI b i d , Zofran, bentyl and reglan - added carafate and abx per GI recommendations  · Follow-up with gastroenterology, information given, if he wants to follow-up with [facility]

## 2021-05-05 NOTE — DISCHARGE SUMMARY
[facility]  Discharge- Anais Jimenez 1965, 50 y o  male MRN: [id number]  Unit/Bed#: S -01 Encounter: [id number]  Primary Care Provider: No primary care provider on file  Date and time admitted to hospital: 5/2/2021  5:55 PM    * Acute gastritis without hemorrhage  Assessment & Plan  Lab Results   Component Value Date    WBC 7 35 05/04/2021     POA: most predominant in mid epigastric area (extends from epigsatrum to umbilicus, midline)  Described as "burning" 8/10  PE is not characteristic of diverticular origin - BS+ normoactive in all 4 quadrants, pain is generalized epigastric area, no tenderness lower abdomen  Patient having regular BMs w/ mild mucus and no bleeding or dark stools  · S/p 2L NS, Dilaudid, Zofran, Reglan, Benadryl, cipro, flagyl in ED   · CT A/P: "Colonic diverticulosis with minimal inflammatory changes around the sigmoid colon which may represent acute diverticulitis "   · DDx: cessation THC, gastroparesis complication, gastritis/enteritis, atypical diverticular presentation, DBCL sequela, GERD, ulcers    5/5 EGD:  · The duodenum appeared normal   · Mild erythematous mucosa in the antrum; performed cold forceps biopsy  · The cardia appeared normal   · The esophagus appeared normal        Plan:  · Received 1 dose of cipro and flagyl in ED - b/c suspicion for diverticulitis   · Serial abdominal exams performed  · PPI b i d , Zofran, bentyl and reglan - added carafate and abx per GI recommendations  · Follow-up with gastroenterology, information given, if he wants to follow-up with [facility]    Diverticulosis  Assessment & Plan  CT A/P: "Colonic diverticulosis with minimal inflammatory changes around the sigmoid colon which may represent acute diverticulitis   If not already performed recently, colonoscopy after the acute illness is recommended to rule out possibility of colon cancer mimicking diverticulitis "   · Follows regularly with GI - Dr Graham Don w/ [facility]  · Afebrile - clinical suspicion for diverticulitis low    Plan:  · Outpatient colonoscopy discussed with the patient  · Ciprofloxacin plus metronidazole per GI recommendation will complete a total 7 days  · Low residue diet was discussed with the patient with paper forms      Hypomagnesemia  Assessment & Plan   Monitor, replete prn    COPD (chronic obstructive pulmonary disease) (HCC)  Assessment & Plan  Hx of COPD  Home medications albuterol and incurse ellipta  Patient c/o of mild chest tightness but states he hasnt used his albuterol today to "open his lungs"   CXR WNL    Plan:  · Continue albuterol inhaler   · Resume home in inhaler      Elevated troponin level not due myocardial infarction  Assessment & Plan  POA: Initial troponin 0 08  Patient w/o CP, SOB  Feels chest tightness but w/ COPD for which he uses albuterol the end Ellipta inhalers daily (had not used on admission when feeling this tightness)   Does have history of CAD s/p stent placement for which he takes aspirin and Plavix daily   EKG on admission shows NSR incomplete right bundle branch, no ST elevations or depressions different from previous EKG in September 2020   Trops stable - unlikely from MI - more likely consequence of ongoing primary conditions   CXR wnl    Plan  · Cont  home AspirinPlavix    Hypothyroidism  Assessment & Plan  Lab Results   Component Value Date    [id number] 0 943 05/02/2021     Home med levothyroxine 100mcg  · Will continue home meds    Status post angioplasty with stent  Assessment & Plan  Hx of stent placement in Dec 2019  Home med asprin 81mg daily  · Will continue ASA, plavix      Marijuana use  Assessment & Plan  POA: patient w/ hx of daily marijuana use for his anxiety/PTSD   Stopped smoking the day prior to presentation (4/30)      Intractable nausea and vomiting  Assessment & Plan  POA   Unclear etiology at this time but some suspicion of relation to chronic THC use (last use 4/30)    S/p 2L NS, Dilaudid, Zofran, Reglan, Benadryl in ED   Improved early in the am but had returned closer to lunch time   UDS positive for opiates, THC    Plan:  · Zofran and Reglan p r n  Type 1 diabetes mellitus ([facility] [address] )  Assessment & Plan  Lab Results   Component Value Date    HGBA1C 7 9 (H) 04/24/2021     Blood Sugar Average: Last 72 hrs:   Patient with home insulin pump which he manages    Monitor blood sugars      Discharging Resident Physician: Devi Steven MD  Attending: Sherry Varghese MD  PCP: No primary care provider on file  Admission Date: 5/2/2021  Discharge Date: 05/06/21    Disposition: Home    Reason for Admission:  Gastritis and diverticulitis    Consultations During Hospital Stay:  · Gastroenterology  · Endocrinology    Procedures Performed:   · EGD    Significant Findings / Test Results:   Ct Abdomen Pelvis Wo Contrast    Result Date: 5/2/2021  Impression: Colonic diverticulosis with minimal inflammatory changes around the sigmoid colon which may represent acute diverticulitis  If not already performed recently, colonoscopy after the acute illness is recommended to rule out possibility of colon cancer mimicking diverticulitis  Workstation performed: [id number]     Xr Chest Portable    Result Date: 5/3/2021  Impression: No acute cardiopulmonary disease  Workstation performed: [id number]       Incidental Findings:   · None    Test Results Pending at Discharge (will require follow up): · Biopsy from EGD     Outpatient Tests Requested:  · Colonoscopy    Complications:  None    Hospital Course:     June Lis is a 50 y o  male patient who originally presented to the hospital on 5/2/2021 due to Gastritis/diverticulitis   Past medical history of type 1 diabetes (has self manage insulin pump), gastroparesis, coronary artery disease status post stent placement December 2020 (asa and plavix), DLBL status post chemo 2016, gastritis, COPD, GERD and daily marijuana use who presents with intractable nausea and vomiting since yesterday morning along with 10/10 burning abdominal pain  Patient states that yesterday morning he ate chicken corn soup - he started to vomit profusely a few hours later but was to eat a Spam sandwich  He continued to vomit and has not stopped since  Vomit is nonbloody nonbilious  Other family members ate soup and are not ill  Of note patient smokes marijuana daily but has not smoked since 04/30  Denies travel history, denies sick contacts      In ED, vital signs remained stable, patient afebrile w/ no leukocytosis  Initial laboratory studies showed troponin of 0 08  EKG EKG showed new incomplete right bundle branch block, no acute evidence of ischemia  Patient is not having chest pain shortness of breath cough  Does report chest tightness however says this is because he has not yet used his daily albuterol to open his chestCT in the ED showed diverticulosis with possible diverticulitis  Patient required Dilaudid Zofran Reglan and Benadryl to provide even slight relief to his symptoms  Also received 2 L of NS  Also given 1 dose of Cipro and Flagyl  Upon bedside evaluation, patient's physical exam was not indicative of diverticular origin  Pain most predominant mid epigastric area  Repeat troponin trended down 0 07     Will admit to [facility] for monitoring, endocrinology consult regarding insulin pump adjustment  Gastroenterology was consulted was placed on IV b i d  Protonix and Carafate with antiemetics, was also placed on antibiotics to cover diverticulitis ciprofloxacin and metronidazole  However patient abdomen did not improve with medication there was some concern regarding ulcer, due to persistent abdominal pain and nausea vomiting  Patient was placed on IV fluids and EGD was performed which showed gastritis and biopsy was performed  GI recommended continue Protonix b i d   And Carafate Q IV and close follow-up with GI for outpatient colonoscopy after antibiotic treatment of diverticulitis  Patient is stable for discharge, low residue diet was discussed with the patient and was asked to follow up with PCP in 1 week  Condition at Discharge: stable     Discharge Day Visit / Exam:     Subjective:  No subjective complain    Vitals: Blood Pressure: 141/67 (05/06/21 0700)  Pulse: 65 (05/06/21 0700)  Temperature: 97 8 °F (36 6 °C) (05/06/21 0700)  Temp Source: Oral (05/06/21 0700)  Respirations: 18 (05/06/21 0700)  Height: 5' 5" (165 1 cm) (05/02/21 1752)  Weight - Scale: 72 6 kg (160 lb) (05/02/21 1752)  SpO2: 96 % (05/06/21 0700)    Exam:   Physical Exam  Vitals signs reviewed  Constitutional:       General: He is not in acute distress  Appearance: Normal appearance  He is well-developed  He is not ill-appearing, toxic-appearing or diaphoretic  HENT:      Head: Normocephalic and atraumatic  Right Ear: External ear normal       Left Ear: External ear normal       Nose: Nose normal  No congestion or rhinorrhea  Mouth/Throat:      Mouth: Mucous membranes are moist       Pharynx: Oropharynx is clear  Eyes:      General: No scleral icterus  Right eye: No discharge  Left eye: No discharge  Extraocular Movements: Extraocular movements intact  Conjunctiva/sclera: Conjunctivae normal    Cardiovascular:      Rate and Rhythm: Normal rate and regular rhythm  Heart sounds: Normal heart sounds  No murmur  No friction rub  No gallop  Pulmonary:      Effort: Pulmonary effort is normal  No respiratory distress  Breath sounds: Normal breath sounds  No stridor  No wheezing, rhonchi or rales  Abdominal:      General: Bowel sounds are normal  There is no distension  Palpations: Abdomen is soft  Tenderness: There is no abdominal tenderness  There is no guarding or rebound  Musculoskeletal:         General: No swelling or deformity  Right lower leg: No edema  Left lower leg: No edema     Skin:     General: Skin is warm and dry  Coloration: Skin is not jaundiced or pale  Neurological:      General: No focal deficit present  Mental Status: He is alert and oriented to person, place, and time  Psychiatric:         Mood and Affect: Mood normal          Discharge instructions/Information to patient and family:   See after visit summary for information provided to patient and family  Provisions for Follow-Up Care:  See after visit summary for information related to follow-up care and any pertinent home health orders  Discharge Medications:  See after visit summary for reconciled discharge medications provided to patient and family        ** Please Note: This note has been constructed using a voice recognition system **

## 2021-05-05 NOTE — ANESTHESIA PREPROCEDURE EVALUATION
Procedure:  EGD    Relevant Problems   ENDO   (+) Hypothyroidism   (+) Type 1 diabetes mellitus (HCC)      PULMONARY   (+) COPD (chronic obstructive pulmonary disease) (HCC)      Other   (+) Abdominal pain   (+) Elevated troponin level not due myocardial infarction   (+) Status post angioplasty with stent      ABDIAS x 3 in November 2020    Denies nausea and vomiting today  Physical Exam    Airway      TM Distance: >3 FB  Neck ROM: full     Dental       Cardiovascular  Cardiovascular exam normal    Pulmonary  Pulmonary exam normal     Other Findings        Anesthesia Plan  ASA Score- 3     Anesthesia Type- IV sedation with anesthesia with ASA Monitors  Additional Monitors:   Airway Plan:           Plan Factors-    Chart reviewed  Patient summary reviewed  Induction- intravenous  Postoperative Plan-     Informed Consent- Anesthetic plan and risks discussed with patient  I personally reviewed this patient with the CRNA  Discussed and agreed on the Anesthesia Plan with the CRNA  Karrie Nissen

## 2021-05-05 NOTE — PROGRESS NOTES
[facility]  Progress Note - Zulay Garcia 1965, 50 y o  male MRN: [id number]  Unit/Bed#: S -01 Encounter: [id number]  Primary Care Provider: No primary care provider on file  Date and time admitted to hospital: 5/2/2021  5:55 PM    Abdominal pain  Assessment & Plan  Lab Results   Component Value Date    WBC 7 35 05/04/2021     POA: most predominant in mid epigastric area (extends from epigsatrum to umbilicus, midline)  Described as "burning" 8/10  PE is not characteristic of diverticular origin - BS+ normoactive in all 4 quadrants, pain is generalized epigastric area, no tenderness lower abdomen  Patient having regular BMs w/ mild mucus and no bleeding or dark stools  · S/p 2L NS, Dilaudid, Zofran, Reglan, Benadryl, cipro, flagyl in ED   · CT A/P: "Colonic diverticulosis with minimal inflammatory changes around the sigmoid colon which may represent acute diverticulitis "   · DDx: cessation THC, gastroparesis complication, gastritis/enteritis, atypical diverticular presentation, DBCL sequela, GERD, ulcers    Plan:  · Received 1 dose of cipro and flagyl in ED - b/c suspicion for diverticulitis lower, will d/c at this time and will continue to monitor off abx for now  · Serial abdominal exams  · IV PPI, bentyl and reglan - added carafate and abx per GI recommendations  · Rpt am labs - monitor WBC, vitals  · Given DCBL hx, would want to r/o sequela, and GI consulted, appreciate recs  · Patient to go for EGD @ 1300    * Intractable nausea and vomiting  Assessment & Plan  POA   Unclear etiology at this time but some suspicion of relation to chronic THC use (last use 4/30)    S/p 2L NS, Dilaudid, Zofran, Reglan, Benadryl in ED     Improved early in the am but had returned closer to lunch time   UDS positive for opiates, THC    Plan:  · AM CBC, BMP   · Additional 1L bolus LR over 4hrs  · Bentyl and Reglan   · IV protonix 40mg   · Mylanta PRN    · Diet clears, will advance as tolerated  · GI consulted, appreciate recs - EGD this afternoon    Hypomagnesemia  Assessment & Plan   Monitor, replete prn    COPD (chronic obstructive pulmonary disease) ([facility])  Assessment & Plan  Hx of COPD  Home medications albuterol and incurse ellipta  Patient c/o of mild chest tightness but states he hasnt used his albuterol today to "open his lungs"   CXR WNL    Plan:  · Continue albuterol inhaler   · Atrovent ordered as incruse ellipta non formulary       Elevated troponin level not due myocardial infarction  Assessment & Plan  POA: Initial troponin 0 08  Patient w/o CP, SOB  Feels chest tightness but w/ COPD for which he uses albuterol the end Ellipta inhalers daily (had not used on admission when feeling this tightness)   Does have history of CAD s/p stent placement for which he takes aspirin and Plavix daily   EKG on admission shows NSR incomplete right bundle branch, no ST elevations or depressions different from previous EKG in September 2020   Trops stable - unlikely from MI - more likely consequence of ongoing primary conditions   CXR wnl    Plan:   · Trend troponin and repeat EKG prn   · Cont  home AspirinPlavix  · Continue to monitor patient clinically for signs and symptoms suggestive of ACS    Diverticulosis  Assessment & Plan  CT A/P: "Colonic diverticulosis with minimal inflammatory changes around the sigmoid colon which may represent acute diverticulitis  If not already performed recently, colonoscopy after the acute illness is recommended to rule out possibility of colon cancer mimicking diverticulitis "   · Follows regularly with GI - Dr Wilian Trejo w/ [facility]  · Afebrile - clinical suspicion for diverticulitis low    Plan:  · As above  · Serial abdominal exams       Hypothyroidism  Assessment & Plan  Lab Results   Component Value Date    [id number] 0 943 05/02/2021     Home med levothyroxine 100mcg    · Will continue home meds    Status post angioplasty with stent  Assessment & Plan  Hx of stent placement in Dec 2019  Home med asprin 81mg daily  · Will continue ASA, plavix  · VTE: Lovenox     Marijuana use  Assessment & Plan  POA: patient w/ hx of daily marijuana use for his anxiety/PTSD  Stopped smoking the day prior to presentation ()   Substituted low dose prn ativan which patient said worked well    Type 1 diabetes mellitus [facility])  Assessment & Plan  Lab Results   Component Value Date    HGBA1C 7 9 (H) 2021     Blood Sugar Average: Last 72 hrs:   Patient with home insulin pump which he manages    Monitor blood sugars    VTE Pharmacologic Prophylaxis:   Pharmacologic: Enoxaparin (Lovenox)  Mechanical VTE Prophylaxis in Place: Yes  Discussions with Specialists or Other Care Team Provider: GI, CM, nursing  Education and Discussions with Family / Patient: patient updated at bedside  All questions and concerns addressed  Patient declined family update  Current Length of Stay: 2 day(s)  Current Patient Status: Inpatient   Discharge Plan / Estimated Discharge Date: patient requires continued IP management for the above but will likely be stable for d/c w/in 24-48 hrs  Code Status: Level 1 - Full Code    Subjective:   Patient seen and examined  No critical events overnight  Denies CP, SOB, N/V/F/C, abdominal pain, HA, dizziness  Overall stable but still having midline abdominal burning pain and increased gas  Will f/u EGD findings and GI recommendations    Objective:   Vitals:   Temp (24hrs), Av 1 °F (36 7 °C), Min:97 8 °F (36 6 °C), Max:98 3 °F (36 8 °C)    Temp:  [97 8 °F (36 6 °C)-98 3 °F (36 8 °C)] 98 3 °F (36 8 °C)  HR:  [62-66] 66  Resp:  [16-19] 19  BP: (125-151)/(60-71) 125/71  SpO2:  [95 %-97 %] 96 %  Body mass index is 26 63 kg/m²  Input and Output Summary (last 24 hours):   No intake or output data in the 24 hours ending 21 1258    Physical Exam  Vitals signs reviewed  Constitutional:       General: He is not in acute distress       Appearance: Normal appearance  He is well-developed  He is not ill-appearing, toxic-appearing or diaphoretic  HENT:      Head: Normocephalic and atraumatic  Right Ear: External ear normal       Left Ear: External ear normal       Nose: Nose normal  No congestion or rhinorrhea  Mouth/Throat:      Mouth: Mucous membranes are moist       Pharynx: Oropharynx is clear  Eyes:      General: No scleral icterus  Right eye: No discharge  Left eye: No discharge  Extraocular Movements: Extraocular movements intact  Conjunctiva/sclera: Conjunctivae normal    Cardiovascular:      Rate and Rhythm: Normal rate and regular rhythm  Heart sounds: Normal heart sounds  No murmur  No friction rub  No gallop  Pulmonary:      Effort: Pulmonary effort is normal  No respiratory distress  Breath sounds: Normal breath sounds  No stridor  No wheezing, rhonchi or rales  Abdominal:      General: Bowel sounds are normal  There is no distension  Palpations: Abdomen is soft  Tenderness: There is abdominal tenderness (midline umbilicus to epigastrium)  There is no guarding or rebound  Musculoskeletal:         General: No swelling or deformity  Right lower leg: No edema  Left lower leg: No edema  Skin:     General: Skin is warm and dry  Coloration: Skin is not jaundiced or pale  Neurological:      General: No focal deficit present  Mental Status: He is alert and oriented to person, place, and time  Psychiatric:         Mood and Affect: Mood normal        Additional Data:   Labs:  Results from last 7 days   Lab Units 05/04/21  0537  05/02/21  1853   WBC Thousand/uL 7 35   < > 8 79   HEMOGLOBIN g/dL 13 4   < > 14 1   HEMATOCRIT % 40 1   < > 42 1   PLATELETS Thousands/uL 192   < > 234   NEUTROS PCT %  --   --  86*   LYMPHS PCT %  --   --  9*   MONOS PCT %  --   --  4   EOS PCT %  --   --  0    < > = values in this interval not displayed       Results from last 7 days   Lab Units 05/04/21  0537  05/02/21  1853   POTASSIUM mmol/L 3 6   < > 3 6   CHLORIDE mmol/L 105   < > 101   CO2 mmol/L 27   < > 26   BUN mg/dL 8   < > 10   CREATININE mg/dL 0 85   < > 0 90   CALCIUM mg/dL 8 0*   < > 8 9   ALK PHOS U/L  --   --  78   ALT U/L  --   --  25   AST U/L  --   --  21    < > = values in this interval not displayed  * I Have Reviewed All Lab Data Listed Above  * Additional Pertinent Lab Tests Reviewed: All Labs Within Last 24 Hours Reviewed    Imaging:  Imaging Reports Reviewed Today Include: CXR, CT A/P  Imaging Personally Reviewed by Myself Includes:  None  XR chest portable   Final Result by Tuyet Amador MD (05/03 1325)      No acute cardiopulmonary disease  Workstation performed: [id number]         CT abdomen pelvis wo contrast   Final Result by Michael Euceda DO (05/02 2025)      Colonic diverticulosis with minimal inflammatory changes around the sigmoid colon which may represent acute diverticulitis  If not already performed recently, colonoscopy after the acute illness is recommended to rule out possibility of colon cancer mimicking diverticulitis              Workstation performed: [id number]           Recent Cultures (last 7 days):       Last 24 Hours Medication List:   Current Facility-Administered Medications   Medication Dose Route Frequency Provider Last Rate    acetaminophen  650 mg Oral Q6H PRN Jasmina Givens MD      albuterol  2 puff Inhalation Q6H PRN Jasmina Givens MD      aluminum-magnesium hydroxide-simethicone  30 mL Oral Q6H PRN Jasmina Givens MD      aspirin  81 mg Oral Daily Jasmina Givens MD      calcium carbonate  1,000 mg Oral Daily PRN Jasmina Givens MD      ciprofloxacin  400 mg Intravenous Q12H Citlali Alaniz  mg (05/05/21 0615)    clopidogrel  75 mg Oral Daily Jasmina Givens MD      enoxaparin  40 mg Subcutaneous Daily Jasmina Givens MD      insulin aspart  1 Units Subcutaneous Insulin Pump Daily PRN Jasmina Givens MD      ipratropium 1 puff Inhalation Daily Hamilton Douglass MD      levothyroxine  100 mcg Oral Early Morning Hamilton Douglass MD      LORazepam  0 5 mg Intravenous Q6H PRN Vinita Linn MD      metoclopramide  10 mg Intravenous Q6H PRN Hamilton Douglass MD      metroNIDAZOLE  500 mg Oral [facility] Vinita Linn MD      pantoprazole  40 mg Intravenous Q12H [address] Vinita Linn MD      patient maintained insulin pump  1 each Subcutaneous Q8H Hamilton Douglass MD      sodium chloride  100 mL/hr Intravenous Continuous Lolita Grigsby  mL/hr (05/05/21 8747)    sucralfate  1 g Oral 4x Daily (AC & HS) Vinita Linn MD        Today, Patient Was Seen By: Vinita Linn MD    ** Please Note: This note has been constructed using a voice recognition system   **

## 2021-05-05 NOTE — PROGRESS NOTES
CRNA aware of blood sugar of 61 prior to start of procedure  Pt  Treated via IV for hypoglycemia by CRNA

## 2021-05-05 NOTE — ASSESSMENT & PLAN NOTE
Hx of COPD  Home medications albuterol and incurse ellipta  Patient c/o of mild chest tightness but states he hasnt used his albuterol today to "open his lungs"     CXR WNL    Plan:  · Continue albuterol inhaler   · Resume home in inhaler

## 2021-05-05 NOTE — ANESTHESIA POSTPROCEDURE EVALUATION
Post-Op Assessment Note    CV Status:  Stable  Pain Score: 0    Pain management: adequate     Mental Status:  Sleepy and arousable   Hydration Status:  Euvolemic   PONV Controlled:  Controlled   Airway Patency:  Patent      Post Op Vitals Reviewed: Yes      Staff: CRNA   Comments: BS recheck in pacu  is 742        No complications documented      /56 (05/05/21 1524)    Temp (!) 97 4 °F (36 3 °C) (05/05/21 1524)    Pulse 65 (05/05/21 1524)   Resp 20 (05/05/21 1524)    SpO2 97 % (05/05/21 1524)

## 2021-05-05 NOTE — ASSESSMENT & PLAN NOTE
CT A/P: "Colonic diverticulosis with minimal inflammatory changes around the sigmoid colon which may represent acute diverticulitis   If not already performed recently, colonoscopy after the acute illness is recommended to rule out possibility of colon cancer mimicking diverticulitis "   · Follows regularly with GI - Dr Child [facility]  · Afebrile - clinical suspicion for diverticulitis low    Plan:  · Outpatient colonoscopy discussed with the patient  · Ciprofloxacin plus metronidazole per GI recommendation will complete a total 7 days  · Low residue diet was discussed with the patient with paper forms

## 2021-05-05 NOTE — ASSESSMENT & PLAN NOTE
Lab Results   Component Value Date    [id number] 0 943 05/02/2021     Home med levothyroxine 100mcg    · Will continue home meds

## 2021-05-06 VITALS
OXYGEN SATURATION: 96 % | SYSTOLIC BLOOD PRESSURE: 141 MMHG | DIASTOLIC BLOOD PRESSURE: 67 MMHG | HEART RATE: 65 BPM | HEIGHT: 65 IN | TEMPERATURE: 97.8 F | RESPIRATION RATE: 18 BRPM | BODY MASS INDEX: 26.66 KG/M2 | WEIGHT: 160 LBS

## 2021-05-06 PROBLEM — K29.00 ACUTE GASTRITIS WITHOUT HEMORRHAGE: Status: ACTIVE | Noted: 2020-09-19

## 2021-05-06 LAB
ANION GAP SERPL CALCULATED.3IONS-SCNC: 8 MMOL/L (ref 4–13)
BUN SERPL-MCNC: 10 MG/DL (ref 5–25)
CALCIUM SERPL-MCNC: 8.3 MG/DL (ref 8.3–10.1)
CHLORIDE SERPL-SCNC: 104 MMOL/L (ref 100–108)
CO2 SERPL-SCNC: 27 MMOL/L (ref 21–32)
CREAT SERPL-MCNC: 0.94 MG/DL (ref 0.6–1.3)
ERYTHROCYTE [DISTWIDTH] IN BLOOD BY AUTOMATED COUNT: 12.3 % (ref 11.6–15.1)
GFR SERPL CREATININE-BSD FRML MDRD: 92 ML/MIN/1.73SQ M
GLUCOSE SERPL-MCNC: 141 MG/DL (ref 65–140)
GLUCOSE SERPL-MCNC: 189 MG/DL (ref 65–140)
HCT VFR BLD AUTO: 39.2 % (ref 36.5–49.3)
HGB BLD-MCNC: 13.2 G/DL (ref 12–17)
MCH RBC QN AUTO: 32 PG (ref 26.8–34.3)
MCHC RBC AUTO-ENTMCNC: 33.7 G/DL (ref 31.4–37.4)
MCV RBC AUTO: 95 FL (ref 82–98)
PLATELET # BLD AUTO: 182 THOUSANDS/UL (ref 149–390)
PMV BLD AUTO: 9.8 FL (ref 8.9–12.7)
POTASSIUM SERPL-SCNC: 3.9 MMOL/L (ref 3.5–5.3)
RBC # BLD AUTO: 4.13 MILLION/UL (ref 3.88–5.62)
SODIUM SERPL-SCNC: 139 MMOL/L (ref 136–145)
WBC # BLD AUTO: 5.85 THOUSAND/UL (ref 4.31–10.16)

## 2021-05-06 PROCEDURE — 80048 BASIC METABOLIC PNL TOTAL CA: CPT | Performed by: INTERNAL MEDICINE

## 2021-05-06 PROCEDURE — 85027 COMPLETE CBC AUTOMATED: CPT | Performed by: INTERNAL MEDICINE

## 2021-05-06 PROCEDURE — 82948 REAGENT STRIP/BLOOD GLUCOSE: CPT

## 2021-05-06 PROCEDURE — 99239 HOSP IP/OBS DSCHRG MGMT >30: CPT | Performed by: INTERNAL MEDICINE

## 2021-05-06 RX ORDER — CIPROFLOXACIN 500 MG/1
500 TABLET, FILM COATED ORAL EVERY 12 HOURS SCHEDULED
Qty: 8 TABLET | Refills: 0 | Status: SHIPPED | OUTPATIENT
Start: 2021-05-06 | End: 2021-05-10

## 2021-05-06 RX ORDER — METRONIDAZOLE 500 MG/1
500 TABLET ORAL EVERY 8 HOURS SCHEDULED
Qty: 12 TABLET | Refills: 0 | Status: SHIPPED | OUTPATIENT
Start: 2021-05-06 | End: 2021-05-10

## 2021-05-06 RX ORDER — SUCRALFATE 1 G/1
1 TABLET ORAL
Qty: 14 TABLET | Refills: 0 | Status: SHIPPED | OUTPATIENT
Start: 2021-05-06

## 2021-05-06 RX ORDER — METOCLOPRAMIDE 5 MG/1
5 TABLET ORAL 4 TIMES DAILY PRN
Qty: 21 TABLET | Refills: 0 | Status: SHIPPED | OUTPATIENT
Start: 2021-05-06

## 2021-05-06 RX ORDER — ONDANSETRON 4 MG/1
4 TABLET, FILM COATED ORAL EVERY 8 HOURS PRN
Qty: 20 TABLET | Refills: 0 | Status: SHIPPED | OUTPATIENT
Start: 2021-05-06

## 2021-05-06 RX ORDER — METRONIDAZOLE 500 MG/1
500 TABLET ORAL EVERY 8 HOURS SCHEDULED
Status: DISCONTINUED | OUTPATIENT
Start: 2021-05-06 | End: 2021-05-06 | Stop reason: HOSPADM

## 2021-05-06 RX ORDER — CIPROFLOXACIN 500 MG/1
500 TABLET, FILM COATED ORAL EVERY 12 HOURS SCHEDULED
Status: DISCONTINUED | OUTPATIENT
Start: 2021-05-06 | End: 2021-05-06 | Stop reason: HOSPADM

## 2021-05-06 RX ORDER — PANTOPRAZOLE SODIUM 40 MG/1
40 TABLET, DELAYED RELEASE ORAL
Qty: 28 TABLET | Refills: 0 | Status: SHIPPED | OUTPATIENT
Start: 2021-05-06 | End: 2021-05-20

## 2021-05-06 RX ADMIN — METRONIDAZOLE 500 MG: 500 TABLET ORAL at 06:56

## 2021-05-06 RX ADMIN — CIPROFLOXACIN 400 MG: 2 INJECTION, SOLUTION INTRAVENOUS at 06:56

## 2021-05-06 RX ADMIN — PANTOPRAZOLE SODIUM 40 MG: 40 TABLET, DELAYED RELEASE ORAL at 06:56

## 2021-05-06 RX ADMIN — CLOPIDOGREL BISULFATE 75 MG: 75 TABLET ORAL at 08:09

## 2021-05-06 RX ADMIN — ENOXAPARIN SODIUM 40 MG: 40 INJECTION SUBCUTANEOUS at 08:09

## 2021-05-06 RX ADMIN — IPRATROPIUM BROMIDE 1 PUFF: 17 AEROSOL, METERED RESPIRATORY (INHALATION) at 08:09

## 2021-05-06 RX ADMIN — SUCRALFATE 1 G: 1 TABLET ORAL at 06:56

## 2021-05-06 RX ADMIN — SODIUM CHLORIDE 100 ML/HR: 0.9 INJECTION, SOLUTION INTRAVENOUS at 01:18

## 2021-05-06 RX ADMIN — LEVOTHYROXINE SODIUM 100 MCG: 100 TABLET ORAL at 06:56

## 2021-05-06 RX ADMIN — SUCRALFATE 1 G: 1 TABLET ORAL at 11:26

## 2021-05-06 RX ADMIN — ASPIRIN 81 MG: 81 TABLET, CHEWABLE ORAL at 08:09

## 2021-05-06 NOTE — ASSESSMENT & PLAN NOTE
Lab Results   Component Value Date    [id number] 0 943 05/02/2021     Home med levothyroxine 100mcg    · Will continue home meds

## 2021-05-06 NOTE — DISCHARGE INSTRUCTIONS
Acute Abdominal Pain   WHAT YOU NEED TO KNOW: The cause of your abdominal pain may not be found  If a cause is found, treatment will depend on what the cause is  DISCHARGE INSTRUCTIONS:   Seek care immediately if:   · You vomit blood or cannot stop vomiting  · You have blood in your bowel movement or it looks like tar  · You have bleeding from your rectum  · Your abdomen is larger than usual, more painful, and hard  · You have severe pain in your abdomen  · You stop passing gas and having bowel movements  · You feel weak, dizzy, or faint  Contact your healthcare provider if:   · You have a fever  · You have new signs and symptoms  · Your symptoms do not get better with treatment  · You have questions or concerns about your condition or care  Medicines  may be given to decrease pain, treat an infection, and manage your symptoms  Take your medicine as directed  Call your healthcare provider if you think your medicine is not helping or if you have side effects  Tell him if you are allergic to any medicine  Keep a list of the medicines, vitamins, and herbs you take  Include the amounts, and when and why you take them  Bring the list or the pill bottles to follow-up visits  Carry your medicine list with you in case of an emergency  Manage your symptoms:   · Apply heat  on your abdomen for 20 to 30 minutes every 2 hours for as many days as directed  Heat helps decrease pain and muscle spasms  · Manage your stress  Stress may cause abdominal pain  Your healthcare provider may recommend relaxation techniques and deep breathing exercises to help decrease your stress  Your healthcare provider may recommend you talk to someone about your stress or anxiety, such as a counselor or a trusted friend  Get plenty of sleep and exercise regularly  · Limit or do not drink alcohol  Alcohol can make your abdominal pain worse  Ask your healthcare provider if it is safe for you to drink alcohol   Also ask how much is safe for you to drink  · Do not smoke  Nicotine and other chemicals in cigarettes can damage your esophagus and stomach  Ask your healthcare provider for information if you currently smoke and need help to quit  E-cigarettes or smokeless tobacco still contain nicotine  Talk to your healthcare provider before you use these products  Make changes to the food you eat as directed:  Do not eat foods that cause abdominal pain or other symptoms  Eat small meals more often  · Eat more high-fiber foods if you are constipated  High-fiber foods include fruits, vegetables, whole-grain foods, and legumes  · Do not eat foods that cause gas if you have bloating  Examples include broccoli, cabbage, and cauliflower  Do not drink soda or carbonated drinks, because these may also cause gas  · Do not eat foods or drinks that contain sorbitol or fructose if you have diarrhea and bloating  Some examples are fruit juices, candy, jelly, and sugar-free gum  · Do not eat high-fat foods, such as fried foods, cheeseburgers, hot dogs, and desserts  · Limit or do not drink caffeine  Caffeine may make symptoms, such as heart burn or nausea, worse  · Drink plenty of liquids to prevent dehydration from diarrhea or vomiting  Ask your healthcare provider how much liquid to drink each day and which liquids are best for you  Follow up with your healthcare provider as directed:  Write down your questions so you remember to ask them during your visits  © Copyright [address] Information is for End User's use only and may not be sold, redistributed or otherwise used for commercial purposes  All illustrations and images included in CareNotes® are the copyrighted property of [employer]  or [address] [place], [place] [address]alejandro   The above information is an  only  It is not intended as medical advice for individual conditions or treatments   Talk to your doctor, nurse or pharmacist before following any medical regimen to see if it is safe and effective for you  Acute Nausea and Vomiting   WHAT YOU NEED TO KNOW: Acute nausea and vomiting start suddenly, worsen quickly, and last a short time  DISCHARGE INSTRUCTIONS:   Seek care immediately if:   · You see blood in your vomit or your bowel movements  · You have sudden, severe pain in your chest and upper abdomen after hard vomiting or retching  · You have swelling in your neck and chest    · You are dizzy, cold, and thirsty and your eyes and mouth are dry  · You are urinating very little or not at all  · You have muscle weakness, leg cramps, and trouble breathing  · Your heart is beating much faster than normal    · You continue to vomit for more than 48 hours  Contact your healthcare provider if:   · You have frequent dry heaves (vomiting but nothing comes out)  · Your nausea and vomiting does not get better or go away after you use medicine  · You have questions or concerns about your condition or treatment  Medicines: You may need any of the following:  · Medicines  may be given to calm your stomach and stop your vomiting  You may also need medicines to help you feel more relaxed or to stop nausea and vomiting caused by motion sickness  · Gastrointestinal stimulants  are used to help empty your stomach and bowels  This may help decrease nausea and vomiting  · Take your medicine as directed  Contact your healthcare provider if you think your medicine is not helping or if you have side effects  Tell him or her if you are allergic to any medicine  Keep a list of the medicines, vitamins, and herbs you take  Include the amounts, and when and why you take them  Bring the list or the pill bottles to follow-up visits  Carry your medicine list with you in case of an emergency  Prevent or manage acute nausea and vomiting:   · Do not drink alcohol  Alcohol may upset or irritate your stomach  Too much alcohol can also cause acute nausea and vomiting  · Control stress    Headaches due to stress may cause nausea and vomiting  Find ways to relax and manage your stress  Get more rest and sleep  · Drink more liquids as directed  Vomiting can lead to dehydration  It is important to drink more liquids to help replace lost body fluids  Ask your healthcare provider how much liquid to drink each day and which liquids are best for you  Your provider may recommend that you drink an oral rehydration solution (ORS)  ORS contains water, salts, and sugar that are needed to replace the lost body fluids  Ask what kind of ORS to use, how much to drink, and where to get it  · Eat smaller meals, more often  Eat small amounts of food every 2 to 3 hours, even if you are not hungry  Food in your stomach may decrease your nausea  · Talk to your healthcare provider before you take over-the-counter (OTC) medicines  These medicines can cause serious problems if you use certain other medicines, or you have a medical condition  You may have problems if you use too much or use them for longer than the label says  Follow directions on the label carefully  Follow up with your healthcare provider as directed:  Write down your questions so you remember to ask them during your visits  © Copyright [address] [place] [address] Information is for End User's use only and may not be sold, redistributed or otherwise used for commercial purposes  All illustrations and images included in CareNotes® are the copyrighted property of [employer] Inc  or [facility] Massiel Joe   The above information is an  only  It is not intended as medical advice for individual conditions or treatments  Talk to your doctor, nurse or pharmacist before following any medical regimen to see if it is safe and effective for you  Diverticulitis Diet   WHAT YOU NEED TO KNOW:   A diverticulitis diet includes foods that allow your intestines to rest while you have diverticulitis   Diverticulitis is a condition that causes small pockets along your intestine called diverticula to become inflamed or infected  This is caused by hard bowel movement, food, or bacteria that get stuck in the pockets  DISCHARGE INSTRUCTIONS:   Foods that may be recommended while you have diverticulitis:   · A clear liquid diet may be recommended for 2 to 3 days  A clear liquid diet includes clear liquids, and foods that are liquid at room temperature  Examples include the following:     ? Water and clear juices (such as apple, cranberry, or grape), strained citrus juices or fruit punch    ? Coffee or tea (without cream or milk)    ? Clear sports drinks or soft drinks, such as ginger ale, lemon-lime soda, or club soda (no cola or root beer)    ? Clear broth, bouillon, or consommé    ? Plain popsicles (no popsicles with pureed fruit or fiber)    ? Flavored gelatin without fruit    · Low-fiber foods may be recommended until your symptoms improve  Examples include the following:     ? Cream of wheat and finely ground grits    ? White bread, white pasta, and white rice    ? Canned and well-cooked fruit without skins or seeds, and juice without pulp    ? Canned and well-cooked vegetables without skins or seeds, and vegetable juice    ? Cow's milk, lactose-free milk, soy milk, and rice milk    ? Yogurt, cottage cheese, and sherbet    ? Eggs, poultry (such as chicken and turkey), fish, and tender, ground, well-cooked beef     ? Tofu and smooth nut butters, such as peanut butter    ? Broth and strained soups made of low-fiber foods    High-fiber foods  can help prevent diverticulosis and diverticulitis  Your healthcare provider will tell you when you can add high-fiber foods back into your diet   Examples include the following:  · Whole grains and breads, and cereals made with whole grains    · Dried fruit, fresh fruit with skin, and fruit pulp    · Raw vegetables    · Cooked greens, such as spinach    · Tough meat and meat with gristle    · Legumes, such as smith beans and lentils    Contact your healthcare provider if:   · Your symptoms do not get better, or they get worse  · You have questions about the foods you should eat  · You have questions or concerns about your condition or care  © Copyright [address] Information is for End User's use only and may not be sold, redistributed or otherwise used for commercial purposes  All illustrations and images included in CareNotes® are the copyrighted property of [employer] M [employer] Inc  or [facility] Massiel Joe   The above information is an  only  It is not intended as medical advice for individual conditions or treatments  Talk to your doctor, nurse or pharmacist before following any medical regimen to see if it is safe and effective for you

## 2021-05-06 NOTE — INCIDENTAL FINDINGS
The following findings require follow up:  Radiographic finding   Finding:  CT abdomen:Colonic diverticulosis with minimal inflammatory changes around the sigmoid colon which may represent acute diverticulitis  If not already performed recently, colonoscopy after the acute illness is recommended to rule out possibility of colon cancer mimicking diverticulitis     Follow up required:  Outpatient follow-up with GI   Follow up should be done within 1 month(s)    Please notify the following clinician to assist with the follow up:   Dr Waqas Bonilla

## 2021-05-20 ENCOUNTER — TELEPHONE (OUTPATIENT)
Dept: GASTROENTEROLOGY | Facility: AMBULARY SURGERY CENTER | Age: 56
End: 2021-05-20

## 2021-05-20 NOTE — TELEPHONE ENCOUNTER
----- Message from Derek Hollingsworth MD sent at 5/20/2021 10:58 AM EDT -----  Please inform patient biopsies from recent upper endoscopy were negative for H pylori     Continue current medications, follow up in the office in 2 months time, call with any questions or concerns

## 2021-05-20 NOTE — TELEPHONE ENCOUNTER
Informed pt of results, pt verbalized understanding  Pt would like a call Friday to schedule follow up apt  Please call pt

## 2021-06-09 ENCOUNTER — HOSPITAL ENCOUNTER (EMERGENCY)
Facility: HOSPITAL | Age: 56
Discharge: HOME | End: 2021-06-09
Attending: EMERGENCY MEDICINE | Admitting: EMERGENCY MEDICINE
Payer: MEDICARE

## 2021-06-09 ENCOUNTER — APPOINTMENT (EMERGENCY)
Dept: CT IMAGING | Facility: HOSPITAL | Age: 56
End: 2021-06-09
Payer: MEDICARE

## 2021-06-09 VITALS
RESPIRATION RATE: 20 BRPM | BODY MASS INDEX: 25.83 KG/M2 | SYSTOLIC BLOOD PRESSURE: 152 MMHG | HEART RATE: 55 BPM | OXYGEN SATURATION: 100 % | DIASTOLIC BLOOD PRESSURE: 69 MMHG | WEIGHT: 155 LBS | HEIGHT: 65 IN

## 2021-06-09 DIAGNOSIS — E10.9 TYPE 1 DIABETES MELLITUS (HCC): ICD-10-CM

## 2021-06-09 DIAGNOSIS — E88.89 KETOSIS (HCC): ICD-10-CM

## 2021-06-09 DIAGNOSIS — R10.31 RIGHT GROIN PAIN: Primary | ICD-10-CM

## 2021-06-09 LAB
ALBUMIN SERPL BCP-MCNC: 3.9 G/DL (ref 3.5–5)
ALP SERPL-CCNC: 72 U/L (ref 46–116)
ALT SERPL W P-5'-P-CCNC: 19 U/L (ref 12–78)
AMPHETAMINES SERPL QL SCN: NEGATIVE
ANION GAP SERPL CALCULATED.3IONS-SCNC: 11 MMOL/L (ref 4–13)
AST SERPL W P-5'-P-CCNC: 18 U/L (ref 5–45)
BARBITURATES UR QL: NEGATIVE
BASE EX.OXY STD BLDV CALC-SCNC: 78 % (ref 60–80)
BASE EXCESS BLDV CALC-SCNC: 2 MMOL/L
BASOPHILS # BLD AUTO: 0.02 THOUSANDS/ΜL (ref 0–0.1)
BASOPHILS NFR BLD AUTO: 0 % (ref 0–1)
BENZODIAZ UR QL: NEGATIVE
BETA-HYDROXYBUTYRATE: 1.2 MMOL/L
BILIRUB SERPL-MCNC: 0.4 MG/DL (ref 0.2–1)
BILIRUB UR QL STRIP: NEGATIVE
BUN SERPL-MCNC: 10 MG/DL (ref 5–25)
CALCIUM SERPL-MCNC: 9 MG/DL (ref 8.3–10.1)
CHLORIDE SERPL-SCNC: 98 MMOL/L (ref 100–108)
CLARITY UR: CLEAR
CO2 SERPL-SCNC: 25 MMOL/L (ref 21–32)
COCAINE UR QL: NEGATIVE
COLOR UR: YELLOW
CREAT SERPL-MCNC: 0.88 MG/DL (ref 0.6–1.3)
EOSINOPHIL # BLD AUTO: 0 THOUSAND/ΜL (ref 0–0.61)
EOSINOPHIL NFR BLD AUTO: 0 % (ref 0–6)
ERYTHROCYTE [DISTWIDTH] IN BLOOD BY AUTOMATED COUNT: 12 % (ref 11.6–15.1)
GFR SERPL CREATININE-BSD FRML MDRD: 97 ML/MIN/1.73SQ M
GLUCOSE SERPL-MCNC: 281 MG/DL (ref 65–140)
GLUCOSE UR STRIP-MCNC: ABNORMAL MG/DL
HCO3 BLDV-SCNC: 24.8 MMOL/L (ref 24–30)
HCT VFR BLD AUTO: 41.9 % (ref 36.5–49.3)
HGB BLD-MCNC: 14.1 G/DL (ref 12–17)
HGB UR QL STRIP.AUTO: NEGATIVE
IMM GRANULOCYTES # BLD AUTO: 0.01 THOUSAND/UL (ref 0–0.2)
IMM GRANULOCYTES NFR BLD AUTO: 0 % (ref 0–2)
KETONES UR STRIP-MCNC: ABNORMAL MG/DL
LEUKOCYTE ESTERASE UR QL STRIP: NEGATIVE
LYMPHOCYTES # BLD AUTO: 0.79 THOUSANDS/ΜL (ref 0.6–4.47)
LYMPHOCYTES NFR BLD AUTO: 10 % (ref 14–44)
MAGNESIUM SERPL-MCNC: 1.4 MG/DL (ref 1.6–2.6)
MCH RBC QN AUTO: 31.9 PG (ref 26.8–34.3)
MCHC RBC AUTO-ENTMCNC: 33.7 G/DL (ref 31.4–37.4)
MCV RBC AUTO: 95 FL (ref 82–98)
METHADONE UR QL: NEGATIVE
MONOCYTES # BLD AUTO: 0.33 THOUSAND/ΜL (ref 0.17–1.22)
MONOCYTES NFR BLD AUTO: 4 % (ref 4–12)
NEUTROPHILS # BLD AUTO: 6.7 THOUSANDS/ΜL (ref 1.85–7.62)
NEUTS SEG NFR BLD AUTO: 86 % (ref 43–75)
NITRITE UR QL STRIP: NEGATIVE
NRBC BLD AUTO-RTO: 0 /100 WBCS
O2 CT BLDV-SCNC: 16.6 ML/DL
OPIATES UR QL SCN: POSITIVE
OXYCODONE+OXYMORPHONE UR QL SCN: NEGATIVE
PCO2 BLDV: 33.4 MM HG (ref 42–50)
PCP UR QL: NEGATIVE
PH BLDV: 7.49 [PH] (ref 7.3–7.4)
PH UR STRIP.AUTO: 6.5 [PH]
PHOSPHATE SERPL-MCNC: 2.1 MG/DL (ref 2.7–4.5)
PLATELET # BLD AUTO: 213 THOUSANDS/UL (ref 149–390)
PMV BLD AUTO: 10.3 FL (ref 8.9–12.7)
PO2 BLDV: 38.8 MM HG (ref 35–45)
POTASSIUM SERPL-SCNC: 4.2 MMOL/L (ref 3.5–5.3)
PROT SERPL-MCNC: 7.5 G/DL (ref 6.4–8.2)
PROT UR STRIP-MCNC: NEGATIVE MG/DL
RBC # BLD AUTO: 4.42 MILLION/UL (ref 3.88–5.62)
SODIUM SERPL-SCNC: 134 MMOL/L (ref 136–145)
SP GR UR STRIP.AUTO: <=1.005 (ref 1–1.03)
THC UR QL: POSITIVE
UROBILINOGEN UR QL STRIP.AUTO: 0.2 E.U./DL
WBC # BLD AUTO: 7.85 THOUSAND/UL (ref 4.31–10.16)

## 2021-06-09 PROCEDURE — 82805 BLOOD GASES W/O2 SATURATION: CPT | Performed by: EMERGENCY MEDICINE

## 2021-06-09 PROCEDURE — 84100 ASSAY OF PHOSPHORUS: CPT | Performed by: EMERGENCY MEDICINE

## 2021-06-09 PROCEDURE — 96375 TX/PRO/DX INJ NEW DRUG ADDON: CPT

## 2021-06-09 PROCEDURE — 80053 COMPREHEN METABOLIC PANEL: CPT | Performed by: EMERGENCY MEDICINE

## 2021-06-09 PROCEDURE — 82010 KETONE BODYS QUAN: CPT | Performed by: EMERGENCY MEDICINE

## 2021-06-09 PROCEDURE — 83735 ASSAY OF MAGNESIUM: CPT | Performed by: EMERGENCY MEDICINE

## 2021-06-09 PROCEDURE — G1004 CDSM NDSC: HCPCS

## 2021-06-09 PROCEDURE — 96365 THER/PROPH/DIAG IV INF INIT: CPT

## 2021-06-09 PROCEDURE — 36415 COLL VENOUS BLD VENIPUNCTURE: CPT | Performed by: EMERGENCY MEDICINE

## 2021-06-09 PROCEDURE — 80307 DRUG TEST PRSMV CHEM ANLYZR: CPT | Performed by: EMERGENCY MEDICINE

## 2021-06-09 PROCEDURE — 99285 EMERGENCY DEPT VISIT HI MDM: CPT | Performed by: EMERGENCY MEDICINE

## 2021-06-09 PROCEDURE — 81003 URINALYSIS AUTO W/O SCOPE: CPT | Performed by: EMERGENCY MEDICINE

## 2021-06-09 PROCEDURE — 99284 EMERGENCY DEPT VISIT MOD MDM: CPT

## 2021-06-09 PROCEDURE — 96366 THER/PROPH/DIAG IV INF ADDON: CPT

## 2021-06-09 PROCEDURE — 96361 HYDRATE IV INFUSION ADD-ON: CPT

## 2021-06-09 PROCEDURE — 85025 COMPLETE CBC W/AUTO DIFF WBC: CPT | Performed by: EMERGENCY MEDICINE

## 2021-06-09 PROCEDURE — 74177 CT ABD & PELVIS W/CONTRAST: CPT

## 2021-06-09 RX ORDER — ONDANSETRON 2 MG/ML
4 INJECTION INTRAMUSCULAR; INTRAVENOUS ONCE
Status: COMPLETED | OUTPATIENT
Start: 2021-06-09 | End: 2021-06-09

## 2021-06-09 RX ORDER — HYDROMORPHONE HCL/PF 1 MG/ML
1 SYRINGE (ML) INJECTION ONCE
Status: COMPLETED | OUTPATIENT
Start: 2021-06-09 | End: 2021-06-09

## 2021-06-09 RX ORDER — KETOROLAC TROMETHAMINE 30 MG/ML
15 INJECTION, SOLUTION INTRAMUSCULAR; INTRAVENOUS ONCE
Status: COMPLETED | OUTPATIENT
Start: 2021-06-09 | End: 2021-06-09

## 2021-06-09 RX ADMIN — HYDROMORPHONE HYDROCHLORIDE 1 MG: 1 INJECTION, SOLUTION INTRAMUSCULAR; INTRAVENOUS; SUBCUTANEOUS at 03:18

## 2021-06-09 RX ADMIN — SODIUM CHLORIDE, SODIUM LACTATE, POTASSIUM CHLORIDE, AND CALCIUM CHLORIDE 1000 ML: .6; .31; .03; .02 INJECTION, SOLUTION INTRAVENOUS at 04:18

## 2021-06-09 RX ADMIN — KETOROLAC TROMETHAMINE 15 MG: 30 INJECTION, SOLUTION INTRAMUSCULAR at 07:23

## 2021-06-09 RX ADMIN — ONDANSETRON 4 MG: 2 INJECTION INTRAMUSCULAR; INTRAVENOUS at 03:18

## 2021-06-09 RX ADMIN — SODIUM CHLORIDE 1000 ML: 0.9 INJECTION, SOLUTION INTRAVENOUS at 03:00

## 2021-06-09 RX ADMIN — IOHEXOL 85 ML: 350 INJECTION, SOLUTION INTRAVENOUS at 03:51

## 2021-06-10 NOTE — ED PROVIDER NOTES
History  Chief Complaint   Patient presents with    Groin Pain     Pt reports RLQ groin pain, states was seen here before for the same  Burning with urination +nausea      This is a 49 y o  old male who presents to the ED for evaluation of groin pain  RLQ abd pain and R inguinal pain  Non radiating  Mild nausea, No VD, no fevers  No prior surgeries  Hx of similar  No trauma, no exacerbating or remitting factors  No medications to help with the pain  Hx IDDm, on pump, working normal  Sugars OK, is taking some PO  Otherwise, patient denies fevers, chills, night sweats, cough, congestion, rhinorrhea, CP, dyspnea, abdominal pain, nausea, vomiting, diarrhea, constipation, urinary symptoms, leg pain or swelling  Prior to Admission Medications   Prescriptions Last Dose Informant Patient Reported? Taking?    PATIENT MAINTAINED INSULIN PUMP   No No   Sig: Inject 1 each under the skin every 8 (eight) hours   albuterol (PROVENTIL HFA,VENTOLIN HFA) 90 mcg/act inhaler   Yes No   Sig: Inhale 2 puffs every 6 (six) hours as needed   aspirin 81 mg chewable tablet  Self Yes No   Sig: Chew 81 mg daily   clopidogrel (PLAVIX) 75 mg tablet   Yes No   Sig: Take 75 mg by mouth daily   dicyclomine (BENTYL) 20 mg tablet   Yes No   Sig: Take 20 mg by mouth 2 (two) times a day   levothyroxine 100 mcg tablet  Self Yes No   Sig: Take 100 mcg by mouth daily   metoclopramide (REGLAN) 5 mg tablet   No No   Sig: Take 1 tablet (5 mg total) by mouth 4 (four) times a day as needed (nausea and vomiting) for up to 21 doses   ondansetron (ZOFRAN) 4 mg tablet   No No   Sig: Take 1 tablet (4 mg total) by mouth every 8 (eight) hours as needed for nausea or vomiting   pantoprazole (PROTONIX) 40 mg tablet   No No   Sig: Take 1 tablet (40 mg total) by mouth 2 (two) times a day before meals for 14 days   sucralfate (CARAFATE) 1 g tablet   No No   Sig: Take 1 tablet (1 g total) by mouth 4 (four) times a day (before meals and at bedtime)   umeclidinium bromide (Incruse Ellipta) 62 5 mcg/inh AEPB inhaler   Yes No   Sig: Inhale 1 Inhaler daily      Facility-Administered Medications: None     Past Medical History:   Diagnosis Date    Coronary artery disease     3 stents     Diabetes mellitus ([facility] [address] )      History reviewed  No pertinent surgical history  History reviewed  No pertinent family history  I have reviewed and agree with the history as documented  E-Cigarette/Vaping    E-Cigarette Use Never User      E-Cigarette/Vaping Substances    Nicotine No     THC Yes     CBD No      Social History     Tobacco Use    Smoking status: Never Smoker    Smokeless tobacco: Never Used   Substance Use Topics    Alcohol use: Not on file    Drug use: Yes     Types: Marijuana     Comment: last use 2 days ago     Review of Systems   Constitutional: Negative for chills, fatigue, fever and unexpected weight change  HENT: Negative for congestion, rhinorrhea and sore throat  Eyes: Negative for redness and visual disturbance  Respiratory: Negative for cough and shortness of breath  Cardiovascular: Negative for chest pain and leg swelling  Gastrointestinal: Positive for abdominal pain  Negative for constipation, diarrhea, nausea and vomiting  Endocrine: Negative for cold intolerance and heat intolerance  Genitourinary: Negative for dysuria, frequency and urgency  Musculoskeletal: Negative for back pain  Skin: Negative for rash  Neurological: Negative for dizziness, syncope and numbness  All other systems reviewed and are negative  Physical Exam  Physical Exam  Vitals signs and nursing note reviewed  Constitutional:       General: He is not in acute distress  Appearance: He is well-developed  He is not diaphoretic  HENT:      Head: Normocephalic and atraumatic        Right Ear: External ear normal       Left Ear: External ear normal       Nose: Nose normal       Mouth/Throat:      Mouth: Mucous membranes are moist    Eyes: Conjunctiva/sclera: Conjunctivae normal       Pupils: Pupils are equal, round, and reactive to light  Neck:      Musculoskeletal: Normal range of motion and neck supple  Cardiovascular:      Rate and Rhythm: Normal rate and regular rhythm  Heart sounds: Normal heart sounds  No murmur  No gallop  Pulmonary:      Effort: Pulmonary effort is normal  No respiratory distress  Breath sounds: Normal breath sounds  No wheezing or rales  Abdominal:      General: Bowel sounds are normal  There is no distension  Palpations: Abdomen is soft  There is no mass  Tenderness: There is abdominal tenderness (RLQ)  There is no guarding or rebound  Comments: R inguinal tenderness, no hernia felt  No lymphadenopathy  Musculoskeletal: Normal range of motion  General: No deformity  Lymphadenopathy:      Cervical: No cervical adenopathy  Skin:     General: Skin is warm and dry  Findings: No erythema or rash  Neurological:      Mental Status: He is alert and oriented to person, place, and time  Cranial Nerves: No cranial nerve deficit         Vital Signs  ED Triage Vitals [06/09/21 0158]   Temp Pulse Respirations Blood Pressure SpO2   -- 55 20 152/69 100 %      Temp src Heart Rate Source Patient Position - Orthostatic VS BP Location FiO2 (%)   -- Monitor Sitting Right arm --      Pain Score       Worst Possible Pain         ED Medications  Medications   sodium chloride 0 9 % bolus 1,000 mL (0 mL Intravenous Stopped 6/9/21 0419)   lactated ringers bolus 1,000 mL (0 mL Intravenous Stopped 6/9/21 0610)   HYDROmorphone (DILAUDID) injection 1 mg (1 mg Intravenous Given 6/9/21 0318)   ondansetron (ZOFRAN) injection 4 mg (4 mg Intravenous Given 6/9/21 0318)   iohexol (OMNIPAQUE) 350 MG/ML injection (SINGLE-DOSE) 85 mL (85 mL Intravenous Given 6/9/21 0351)   ketorolac (TORADOL) injection 15 mg (15 mg Intravenous Given 6/9/21 5064)     Diagnostic Studies  Results Reviewed     Procedure Component Value Units Date/Time    UA w Reflex to Microscopic w Reflex to Culture [[id number]]  (Abnormal) Collected: 06/09/21 0531    Lab Status: Final result Specimen: Urine, Clean Catch Updated: 06/09/21 0600     Color, UA Yellow     Clarity, UA Clear     Specific Gravity, UA <=1 005     pH, UA 6 5     Leukocytes, UA Negative     Nitrite, UA Negative     Protein, UA Negative mg/dl      Glucose,  (1/2%) mg/dl      Ketones, UA >=80 (3+) mg/dl      Urobilinogen, UA 0 2 E U /dl      Bilirubin, UA Negative     Blood, UA Negative    Rapid drug screen, urine [[id number]]  (Abnormal) Collected: 06/09/21 0531    Lab Status: Final result Specimen: Urine, Clean Catch Updated: 06/09/21 0551     Amph/Meth UR Negative     Barbiturate Ur Negative     Benzodiazepine Urine Negative     Cocaine Urine Negative     Methadone Urine Negative     Opiate Urine Positive     PCP Ur Negative     THC Urine Positive     Oxycodone Urine Negative    Narrative:      Presumptive report  If requested, specimen will be sent to reference lab for confirmation  FOR MEDICAL PURPOSES ONLY  IF CONFIRMATION NEEDED PLEASE CONTACT THE LAB WITHIN 5 DAYS      Drug Screen Cutoff Levels:  AMPHETAMINE/METHAMPHETAMINES  1000 ng/mL  BARBITURATES     200 ng/mL  BENZODIAZEPINES     200 ng/mL  COCAINE      300 ng/mL  METHADONE      300 ng/mL  OPIATES      300 ng/mL  PHENCYCLIDINE     25 ng/mL  THC       50 ng/mL  OXYCODONE      100 ng/mL    Comprehensive metabolic panel [[id number]]  (Abnormal) Collected: 06/09/21 0250    Lab Status: Final result Specimen: Blood from Arm, Left Updated: 06/09/21 0329     Sodium 134 mmol/L      Potassium 4 2 mmol/L      Chloride 98 mmol/L      CO2 25 mmol/L      ANION GAP 11 mmol/L      BUN 10 mg/dL      Creatinine 0 88 mg/dL      Glucose 281 mg/dL      Calcium 9 0 mg/dL      AST 18 U/L      ALT 19 U/L      Alkaline Phosphatase 72 U/L      Total Protein 7 5 g/dL      Albumin 3 9 g/dL      Total Bilirubin 0 40 mg/dL      eGFR 97 ml/min/1 73sq m     Narrative:      National Kidney Disease Foundation guidelines for Chronic Kidney Disease (CKD):     Stage 1 with normal or high GFR (GFR > 90 mL/min/1 73 square meters)    Stage 2 Mild CKD (GFR = 60-89 mL/min/1 73 square meters)    Stage 3A Moderate CKD (GFR = 45-59 mL/min/1 73 square meters)    Stage 3B Moderate CKD (GFR = 30-44 mL/min/1 73 square meters)    Stage 4 Severe CKD (GFR = 15-29 mL/min/1 73 square meters)    Stage 5 End Stage CKD (GFR <15 mL/min/1 73 square meters)  Note: GFR calculation is accurate only with a steady state creatinine    Magnesium [[id number]]  (Abnormal) Collected: 06/09/21 0250    Lab Status: Final result Specimen: Blood from Arm, Left Updated: 06/09/21 0329     Magnesium 1 4 mg/dL     Phosphorus [[id number]]  (Abnormal) Collected: 06/09/21 0250    Lab Status: Final result Specimen: Blood from Arm, Left Updated: 06/09/21 0329     Phosphorus 2 1 mg/dL     Beta Hydroxybutyrate [[id number]]  (Abnormal) Collected: 06/09/21 0250    Lab Status: Final result Specimen: Blood from Arm, Left Updated: 06/09/21 0320     BETA-HYDROXYBUTYRATE 1 2 mmol/L     CBC and differential [[id number]]  (Abnormal) Collected: 06/09/21 0250    Lab Status: Final result Specimen: Blood from Arm, Left Updated: 06/09/21 0308     WBC 7 85 Thousand/uL      RBC 4 42 Million/uL      Hemoglobin 14 1 g/dL      Hematocrit 41 9 %      MCV 95 fL      MCH 31 9 pg      MCHC 33 7 g/dL      RDW 12 0 %      MPV 10 3 fL      Platelets 500 Thousands/uL      nRBC 0 /100 WBCs      Neutrophils Relative 86 %      Immat GRANS % 0 %      Lymphocytes Relative 10 %      Monocytes Relative 4 %      Eosinophils Relative 0 %      Basophils Relative 0 %      Neutrophils Absolute 6 70 Thousands/µL      Immature Grans Absolute 0 01 Thousand/uL      Lymphocytes Absolute 0 79 Thousands/µL      Monocytes Absolute 0 33 Thousand/µL      Eosinophils Absolute 0 00 Thousand/µL      Basophils Absolute 0 02 Thousands/µL     Blood gas, venous [[id number]]  (Abnormal) Collected: 06/09/21 0250    Lab Status: Final result Specimen: Blood from Arm, Left Updated: 06/09/21 0307     pH, [place] 7 488     pCO2, [place] 33 4 mm Hg      pO2, [place] 38 8 mm Hg      HCO3, [place] 24 8 mmol/L      Base Excess, [place] 2 0 mmol/L      O2 Content, [place] 16 6 ml/dL      O2 HGB, VENOUS 78 0 %              CT abdomen pelvis w contrast   Final Result by Mikael Galindo MD (06/09 1291)      No acute intra-abdominal abnormality  No free air or free fluid  Normal appendix visualized  A few scattered colonic diverticula with no inflammatory changes present to suggest acute diverticulitis  Workstation performed: [id number]           Procedures  Procedures    ED Course        A/P: This is a 49 y o  male who presents to the ED for evaluation of abd pain  Labs CT, pain management  Reevaluate  Labs and imgaing OK  Suspect the start of inguinal hernia and pain with expanding sac? Nothing entrapped on exam or imagineg  Mild ketones noted  IVF given  Can tolerate PO, recommend ketone dosing at home for his DM  I personally discussed return precautions with this patient  I provided the patient with written discharge instructions and particularly highlighted specific areas of interest to this patient, including but not limited to: medications for symptom managment, follow up recommendations, and return precautions  Patient is in agreement with this plan as outlined above        MDM    Disposition  Final diagnoses:   Right groin pain   Type 1 diabetes mellitus ([address] )   Ketosis ([facility] [address] )     Time reflects when diagnosis was documented in both MDM as applicable and the Disposition within this note     Time User Action Codes Description Comment    6/9/2021  7:03 AM [place] Vicki Add [R10 31] Right groin pain     6/9/2021  7:04 AM [place] Vicki Add [E10 9] Type 1 diabetes mellitus ([address] )     6/9/2021  7:04 AM [place] Vicki Add [E88 89] Ketosis [facility])       ED Disposition     ED Disposition Condition Date/Time Comment    Discharge Stable Wed Jun 9, 2021 Ben Carreon discharge to home/self care              Follow-up Information     Follow up With Specialties Details Why Contact Info Additional [address] Call   Charlie Meier  [place] [address]  [address] , [facility] [place], [address] [facility] [place] [facility] Call   [address] [facility] [address] [address]  [address] [facility] [place], [address] [place], [place], [address]          Discharge Medication List as of 6/9/2021  7:04 AM      CONTINUE these medications which have NOT CHANGED    Details   albuterol (PROVENTIL HFA,VENTOLIN HFA) 90 mcg/act inhaler Inhale 2 puffs every 6 (six) hours as needed, Starting Wed 10/7/2020, Until Thu 10/7/2021, Historical Med      aspirin 81 mg chewable tablet Chew 81 mg daily, Historical Med      clopidogrel (PLAVIX) 75 mg tablet Take 75 mg by mouth daily, Starting Wed 12/23/2020, Until Thu 12/23/2021, Historical Med      dicyclomine (BENTYL) 20 mg tablet Take 20 mg by mouth 2 (two) times a day, Starting Mon 1/11/2021, Historical Med      levothyroxine 100 mcg tablet Take 100 mcg by mouth daily, Historical Med      metoclopramide (REGLAN) 5 mg tablet Take 1 tablet (5 mg total) by mouth 4 (four) times a day as needed (nausea and vomiting) for up to 21 doses, Starting Thu 5/6/2021, Normal      ondansetron (ZOFRAN) 4 mg tablet Take 1 tablet (4 mg total) by mouth every 8 (eight) hours as needed for nausea or vomiting, Starting Thu 5/6/2021, Normal      pantoprazole (PROTONIX) 40 mg tablet Take 1 tablet (40 mg total) by mouth 2 (two) times a day before meals for 14 days, Starting Thu 5/6/2021, Until Thu 5/20/2021, Normal      PATIENT MAINTAINED INSULIN PUMP Inject 1 each under the skin every 8 (eight) hours, Starting Thu 5/6/2021, No Print      sucralfate (CARAFATE) 1 g tablet Take 1 tablet (1 g total) by mouth 4 (four) times a day (before meals and at bedtime), Starting Thu 5/6/2021, Normal      umeclidinium bromide (Incruse Ellipta) 62 5 mcg/inh AEPB inhaler Inhale 1 Inhaler daily, Starting Mon 4/19/2021, Historical Med           No discharge procedures on file      PDMP Review     None          ED Provider  Electronically Signed by           Jonh Garces MD  06/10/21 2243

## 2022-01-18 ENCOUNTER — APPOINTMENT (EMERGENCY)
Dept: CT IMAGING | Facility: HOSPITAL | Age: 57
End: 2022-01-18
Payer: MEDICARE

## 2022-01-18 ENCOUNTER — TELEPHONE (OUTPATIENT)
Dept: CT IMAGING | Facility: HOSPITAL | Age: 57
End: 2022-01-18

## 2022-01-18 ENCOUNTER — APPOINTMENT (EMERGENCY)
Dept: RADIOLOGY | Facility: HOSPITAL | Age: 57
End: 2022-01-18
Payer: MEDICARE

## 2022-01-18 ENCOUNTER — HOSPITAL ENCOUNTER (EMERGENCY)
Facility: HOSPITAL | Age: 57
Discharge: HOME | End: 2022-01-18
Attending: EMERGENCY MEDICINE | Admitting: EMERGENCY MEDICINE
Payer: MEDICARE

## 2022-01-18 VITALS
HEART RATE: 50 BPM | OXYGEN SATURATION: 100 % | SYSTOLIC BLOOD PRESSURE: 138 MMHG | TEMPERATURE: 98.7 F | BODY MASS INDEX: 24.99 KG/M2 | DIASTOLIC BLOOD PRESSURE: 66 MMHG | RESPIRATION RATE: 17 BRPM | WEIGHT: 150 LBS | HEIGHT: 65 IN

## 2022-01-18 DIAGNOSIS — R10.9 ABDOMINAL PAIN: Primary | ICD-10-CM

## 2022-01-18 LAB
4HR DELTA HS TROPONIN: -1 NG/L
ALBUMIN SERPL BCP-MCNC: 3.8 G/DL (ref 3.5–5)
ALP SERPL-CCNC: 127 U/L (ref 46–116)
ALT SERPL W P-5'-P-CCNC: 16 U/L (ref 12–78)
ANION GAP SERPL CALCULATED.3IONS-SCNC: 17 MMOL/L (ref 4–13)
AST SERPL W P-5'-P-CCNC: 18 U/L (ref 5–45)
ATRIAL RATE: 51 BPM
ATRIAL RATE: 51 BPM
BASOPHILS # BLD AUTO: 0.01 THOUSANDS/ΜL (ref 0–0.1)
BASOPHILS NFR BLD AUTO: 0 % (ref 0–1)
BILIRUB SERPL-MCNC: 0.22 MG/DL (ref 0.2–1)
BUN SERPL-MCNC: 10 MG/DL (ref 5–25)
CALCIUM SERPL-MCNC: 9.8 MG/DL (ref 8.3–10.1)
CARDIAC TROPONIN I PNL SERPL HS: 10 NG/L
CARDIAC TROPONIN I PNL SERPL HS: 11 NG/L
CHLORIDE SERPL-SCNC: 95 MMOL/L (ref 100–108)
CO2 SERPL-SCNC: 19 MMOL/L (ref 21–32)
CREAT SERPL-MCNC: 1.06 MG/DL (ref 0.6–1.3)
EOSINOPHIL # BLD AUTO: 0 THOUSAND/ΜL (ref 0–0.61)
EOSINOPHIL NFR BLD AUTO: 0 % (ref 0–6)
ERYTHROCYTE [DISTWIDTH] IN BLOOD BY AUTOMATED COUNT: 14.6 % (ref 11.6–15.1)
GFR SERPL CREATININE-BSD FRML MDRD: 79 ML/MIN/1.73SQ M
GLUCOSE SERPL-MCNC: 253 MG/DL (ref 65–140)
GLUCOSE SERPL-MCNC: 278 MG/DL (ref 65–140)
HCT VFR BLD AUTO: 35.9 % (ref 36.5–49.3)
HGB BLD-MCNC: 12.2 G/DL (ref 12–17)
IMM GRANULOCYTES # BLD AUTO: 0.01 THOUSAND/UL (ref 0–0.2)
IMM GRANULOCYTES NFR BLD AUTO: 0 % (ref 0–2)
LACTATE SERPL-SCNC: 2 MMOL/L (ref 0.5–2)
LIPASE SERPL-CCNC: 54 U/L (ref 73–393)
LYMPHOCYTES # BLD AUTO: 0.46 THOUSANDS/ΜL (ref 0.6–4.47)
LYMPHOCYTES NFR BLD AUTO: 11 % (ref 14–44)
MCH RBC QN AUTO: 28.6 PG (ref 26.8–34.3)
MCHC RBC AUTO-ENTMCNC: 34 G/DL (ref 31.4–37.4)
MCV RBC AUTO: 84 FL (ref 82–98)
MONOCYTES # BLD AUTO: 0.29 THOUSAND/ΜL (ref 0.17–1.22)
MONOCYTES NFR BLD AUTO: 7 % (ref 4–12)
NEUTROPHILS # BLD AUTO: 3.46 THOUSANDS/ΜL (ref 1.85–7.62)
NEUTS SEG NFR BLD AUTO: 82 % (ref 43–75)
NRBC BLD AUTO-RTO: 0 /100 WBCS
P AXIS: 23 DEGREES
P AXIS: 56 DEGREES
PLATELET # BLD AUTO: 189 THOUSANDS/UL (ref 149–390)
PMV BLD AUTO: 9.4 FL (ref 8.9–12.7)
POTASSIUM SERPL-SCNC: 3.5 MMOL/L (ref 3.5–5.3)
PR INTERVAL: 126 MS
PR INTERVAL: 140 MS
PROT SERPL-MCNC: 8.5 G/DL (ref 6.4–8.2)
QRS AXIS: -21 DEGREES
QRS AXIS: 7 DEGREES
QRSD INTERVAL: 100 MS
QRSD INTERVAL: 102 MS
QT INTERVAL: 462 MS
QT INTERVAL: 466 MS
QTC INTERVAL: 421 MS
QTC INTERVAL: 425 MS
RBC # BLD AUTO: 4.27 MILLION/UL (ref 3.88–5.62)
SODIUM SERPL-SCNC: 131 MMOL/L (ref 136–145)
T WAVE AXIS: -16 DEGREES
T WAVE AXIS: 8 DEGREES
VENTRICULAR RATE: 49 BPM
VENTRICULAR RATE: 51 BPM
WBC # BLD AUTO: 4.23 THOUSAND/UL (ref 4.31–10.16)

## 2022-01-18 PROCEDURE — 36415 COLL VENOUS BLD VENIPUNCTURE: CPT

## 2022-01-18 PROCEDURE — 96374 THER/PROPH/DIAG INJ IV PUSH: CPT

## 2022-01-18 PROCEDURE — 74177 CT ABD & PELVIS W/CONTRAST: CPT

## 2022-01-18 PROCEDURE — 83690 ASSAY OF LIPASE: CPT | Performed by: EMERGENCY MEDICINE

## 2022-01-18 PROCEDURE — 93005 ELECTROCARDIOGRAM TRACING: CPT

## 2022-01-18 PROCEDURE — 80053 COMPREHEN METABOLIC PANEL: CPT | Performed by: EMERGENCY MEDICINE

## 2022-01-18 PROCEDURE — 85025 COMPLETE CBC W/AUTO DIFF WBC: CPT | Performed by: EMERGENCY MEDICINE

## 2022-01-18 PROCEDURE — 99285 EMERGENCY DEPT VISIT HI MDM: CPT | Performed by: EMERGENCY MEDICINE

## 2022-01-18 PROCEDURE — 99285 EMERGENCY DEPT VISIT HI MDM: CPT

## 2022-01-18 PROCEDURE — 82948 REAGENT STRIP/BLOOD GLUCOSE: CPT

## 2022-01-18 PROCEDURE — 96375 TX/PRO/DX INJ NEW DRUG ADDON: CPT

## 2022-01-18 PROCEDURE — 84484 ASSAY OF TROPONIN QUANT: CPT | Performed by: EMERGENCY MEDICINE

## 2022-01-18 PROCEDURE — 83605 ASSAY OF LACTIC ACID: CPT | Performed by: EMERGENCY MEDICINE

## 2022-01-18 PROCEDURE — 71045 X-RAY EXAM CHEST 1 VIEW: CPT

## 2022-01-18 PROCEDURE — 93010 ELECTROCARDIOGRAM REPORT: CPT | Performed by: INTERNAL MEDICINE

## 2022-01-18 RX ORDER — ONDANSETRON 2 MG/ML
INJECTION INTRAMUSCULAR; INTRAVENOUS
Status: DISCONTINUED
Start: 2022-01-18 | End: 2022-01-18

## 2022-01-18 RX ORDER — HYDROMORPHONE HCL/PF 1 MG/ML
0.5 SYRINGE (ML) INJECTION ONCE
Status: COMPLETED | OUTPATIENT
Start: 2022-01-18 | End: 2022-01-18

## 2022-01-18 RX ORDER — HYDROMORPHONE HCL/PF 1 MG/ML
0.2 SYRINGE (ML) INJECTION ONCE
Status: DISCONTINUED | OUTPATIENT
Start: 2022-01-18 | End: 2022-01-18

## 2022-01-18 RX ORDER — SUCRALFATE 1 G/1
1 TABLET ORAL ONCE
Status: COMPLETED | OUTPATIENT
Start: 2022-01-18 | End: 2022-01-18

## 2022-01-18 RX ORDER — ONDANSETRON 2 MG/ML
4 INJECTION INTRAMUSCULAR; INTRAVENOUS ONCE
Status: COMPLETED | OUTPATIENT
Start: 2022-01-18 | End: 2022-01-18

## 2022-01-18 RX ORDER — ONDANSETRON 4 MG/1
4 TABLET, ORALLY DISINTEGRATING ORAL ONCE
Status: COMPLETED | OUTPATIENT
Start: 2022-01-18 | End: 2022-01-18

## 2022-01-18 RX ORDER — MORPHINE SULFATE 4 MG/ML
4 INJECTION, SOLUTION INTRAMUSCULAR; INTRAVENOUS
Status: DISCONTINUED | OUTPATIENT
Start: 2022-01-18 | End: 2022-01-18 | Stop reason: HOSPADM

## 2022-01-18 RX ADMIN — ONDANSETRON 4 MG: 2 INJECTION INTRAMUSCULAR; INTRAVENOUS at 06:28

## 2022-01-18 RX ADMIN — IOHEXOL 50 ML: 350 INJECTION, SOLUTION INTRAVENOUS at 06:44

## 2022-01-18 RX ADMIN — HYDROMORPHONE HYDROCHLORIDE 0.5 MG: 1 INJECTION, SOLUTION INTRAMUSCULAR; INTRAVENOUS; SUBCUTANEOUS at 07:48

## 2022-01-18 RX ADMIN — SUCRALFATE 1 G: 1 TABLET ORAL at 07:48

## 2022-01-18 RX ADMIN — MORPHINE SULFATE 4 MG: 4 INJECTION INTRAVENOUS at 06:12

## 2022-01-18 RX ADMIN — ONDANSETRON 4 MG: 4 TABLET, ORALLY DISINTEGRATING ORAL at 03:34

## 2022-01-18 RX ADMIN — SODIUM CHLORIDE 1000 ML: 0.9 INJECTION, SOLUTION INTRAVENOUS at 06:12

## 2022-01-18 NOTE — ED PROVIDER NOTES
History  Chief Complaint   Patient presents with    Abdominal Pain     patient comes in w/ abd pain and vomiting that began yesterday  Patient describes pain as burning  Patient reporting difficulty breathing through his nose and increased pain w/ breathing  Reports unable to keep anything down at home       HPI     Patient is a pleasant 49 yom who presents with mid abdominal pain      + history of diverticulitis and gastroparesis  He suspect this is his gastroparesis  No chest pain  No sob      + achi mid abdominal tenderness  No focal neuro deficits  [facility] 49 yom, will ct to rule out acute intraabdominal process, otherwise, possible gastroparesis, if all normal okay to go home  Prior to Admission Medications   Prescriptions Last Dose Informant Patient Reported? Taking?    PATIENT MAINTAINED INSULIN PUMP   No No   Sig: Inject 1 each under the skin every 8 (eight) hours   aspirin 81 mg chewable tablet  Self Yes No   Sig: Chew 81 mg daily   clopidogrel (PLAVIX) 75 mg tablet   Yes No   Sig: Take 75 mg by mouth daily   dicyclomine (BENTYL) 20 mg tablet   Yes No   Sig: Take 20 mg by mouth 2 (two) times a day   levothyroxine 100 mcg tablet  Self Yes No   Sig: Take 100 mcg by mouth daily   metoclopramide (REGLAN) 5 mg tablet   No No   Sig: Take 1 tablet (5 mg total) by mouth 4 (four) times a day as needed (nausea and vomiting) for up to 21 doses   ondansetron (ZOFRAN) 4 mg tablet   No No   Sig: Take 1 tablet (4 mg total) by mouth every 8 (eight) hours as needed for nausea or vomiting   pantoprazole (PROTONIX) 40 mg tablet   No No   Sig: Take 1 tablet (40 mg total) by mouth 2 (two) times a day before meals for 14 days   sucralfate (CARAFATE) 1 g tablet   No No   Sig: Take 1 tablet (1 g total) by mouth 4 (four) times a day (before meals and at bedtime)   umeclidinium bromide (Incruse Ellipta) 62 5 mcg/inh AEPB inhaler   Yes No   Sig: Inhale 1 Inhaler daily      Facility-Administered Medications: None       Past Medical History:   Diagnosis Date    Coronary artery disease     3 stents     Diabetes mellitus ([address] )        Past Surgical History:   Procedure Laterality Date    KNEE SURGERY         History reviewed  No pertinent family history  I have reviewed and agree with the history as documented  E-Cigarette/Vaping    E-Cigarette Use Never User      E-Cigarette/Vaping Substances    Nicotine No     THC Yes     CBD No      Social History     Tobacco Use    Smoking status: Never Smoker    Smokeless tobacco: Never Used   Vaping Use    Vaping Use: Never used   Substance Use Topics    Alcohol use: Not Currently    Drug use: Yes     Types: Marijuana     Comment: last use 2 days ago       Review of Systems   Gastrointestinal: Positive for abdominal pain  All other systems reviewed and are negative  Physical Exam  Physical Exam  Vitals and nursing note reviewed  Constitutional:       Appearance: He is well-developed  He is not diaphoretic  HENT:      Head: Normocephalic and atraumatic  Right Ear: External ear normal       Left Ear: External ear normal       Nose: No congestion  Eyes:      General:         Right eye: No discharge  Left eye: No discharge  Extraocular Movements: Extraocular movements intact  Cardiovascular:      Rate and Rhythm: Normal rate and regular rhythm  Heart sounds: Normal heart sounds  No murmur heard  Pulmonary:      Effort: Pulmonary effort is normal  No respiratory distress  Breath sounds: Normal breath sounds  No wheezing  Abdominal:      General: There is no distension  Palpations: Abdomen is soft  Tenderness: There is abdominal tenderness  Musculoskeletal:         General: No tenderness or signs of injury  Cervical back: Normal range of motion and neck supple  Skin:     General: Skin is warm and dry  Findings: No erythema  Neurological:      General: No focal deficit present  Mental Status: He is alert and oriented to person, place, and time  Motor: No weakness  Psychiatric:         Mood and Affect: Mood normal          Behavior: Behavior normal          Vital Signs  ED Triage Vitals [01/18/22 0322]   Temperature Pulse Respirations Blood Pressure SpO2   98 7 °F (37 1 °C) (!) 52 20 145/65 99 %      Temp Source Heart Rate Source Patient Position - Orthostatic VS BP Location FiO2 (%)   Oral Monitor Sitting Left arm --      Pain Score       10 - Worst Possible Pain           Vitals:    01/18/22 0524 01/18/22 0545 01/18/22 0600 01/18/22 0700   BP: (!) 186/76 141/62 151/70 138/66   Pulse: (!) 48 (!) 50 (!) 48 (!) 50   Patient Position - Orthostatic VS: Lying            Visual Acuity      ED Medications  Medications   ondansetron (ZOFRAN-ODT) dispersible tablet 4 mg (4 mg Oral Given 1/18/22 0334)   sodium chloride 0 9 % bolus 1,000 mL (0 mL Intravenous Stopped 1/18/22 0642)   ondansetron (ZOFRAN) injection 4 mg (4 mg Intravenous Given 1/18/22 0628)   iohexol (OMNIPAQUE) 350 MG/ML injection (SINGLE-DOSE) 100 mL (50 mL Intravenous Given 1/18/22 0644)   HYDROmorphone (DILAUDID) injection 0 5 mg (0 5 mg Intravenous Given 1/18/22 0748)   sucralfate (CARAFATE) tablet 1 g (1 g Oral Given 1/18/22 0748)       Diagnostic Studies  Results Reviewed     Procedure Component Value Units Date/Time    Lactic acid, plasma [[id number]]  (Normal) Collected: 01/18/22 0701    Lab Status: Final result Specimen: Blood from Arm, Left Updated: 01/18/22 0825     LACTIC ACID 2 0 mmol/L     Narrative:      Result may be elevated if tourniquet was used during collection      HS Troponin I 4hr [[id number]] Collected: 01/18/22 0701    Lab Status: Final result Specimen: Blood from Arm, Left Updated: 01/18/22 0801     hs TnI 4hr 10 ng/L      Delta 4hr hsTnI -1 ng/L     Fingerstick Glucose (POCT) [[id number]]  (Abnormal) Collected: 01/18/22 0526    Lab Status: Final result Updated: 01/18/22 0534     POC Glucose 278 mg/dl HS Troponin 0hr (reflex protocol) [[id number]]  (Normal) Collected: 01/18/22 0434    Lab Status: Final result Specimen: Blood from Arm, Right Updated: 01/18/22 0512     hs TnI 0hr 11 ng/L     Lipase [[id number]]  (Abnormal) Collected: 01/18/22 0433    Lab Status: Final result Specimen: Blood from Arm, Right Updated: 01/18/22 0510     Lipase 54 u/L     CBC and differential [[id number]]  (Abnormal) Collected: 01/18/22 0434    Lab Status: Final result Specimen: Blood from Arm, Right Updated: 01/18/22 0450     WBC 4 23 Thousand/uL      RBC 4 27 Million/uL      Hemoglobin 12 2 g/dL      Hematocrit 35 9 %      MCV 84 fL      MCH 28 6 pg      MCHC 34 0 g/dL      RDW 14 6 %      MPV 9 4 fL      Platelets 330 Thousands/uL      nRBC 0 /100 WBCs      Neutrophils Relative 82 %      Immat GRANS % 0 %      Lymphocytes Relative 11 %      Monocytes Relative 7 %      Eosinophils Relative 0 %      Basophils Relative 0 %      Neutrophils Absolute 3 46 Thousands/µL      Immature Grans Absolute 0 01 Thousand/uL      Lymphocytes Absolute 0 46 Thousands/µL      Monocytes Absolute 0 29 Thousand/µL      Eosinophils Absolute 0 00 Thousand/µL      Basophils Absolute 0 01 Thousands/µL     Comprehensive metabolic panel [[id number]]  (Abnormal) Collected: 01/18/22 0334    Lab Status: Final result Specimen: Blood from Arm, Left Updated: 01/18/22 0409     Sodium 131 mmol/L      Potassium 3 5 mmol/L      Chloride 95 mmol/L      CO2 19 mmol/L      ANION GAP 17 mmol/L      BUN 10 mg/dL      Creatinine 1 06 mg/dL      Glucose 253 mg/dL      Calcium 9 8 mg/dL      AST 18 U/L      ALT 16 U/L      Alkaline Phosphatase 127 U/L      Total Protein 8 5 g/dL      Albumin 3 8 g/dL      Total Bilirubin 0 22 mg/dL      eGFR 79 ml/min/1 73sq m     Narrative:      [place] guidelines for Chronic Kidney Disease (CKD):     Stage 1 with normal or high GFR (GFR > 90 mL/min/1 73 square meters)    Stage 2 Mild CKD (GFR = 60-89 mL/min/1 73 square meters)    Stage 3A Moderate CKD (GFR = 45-59 mL/min/1 73 square meters)    Stage 3B Moderate CKD (GFR = 30-44 mL/min/1 73 square meters)    Stage 4 Severe CKD (GFR = 15-29 mL/min/1 73 square meters)    Stage 5 End Stage CKD (GFR <15 mL/min/1 73 square meters)  Note: GFR calculation is accurate only with a steady state creatinine                 CT abdomen pelvis with contrast   Final Result by Lalitha Haynes MD (01/18 1398)      No acute abnormality            Workstation performed: [id number]         XR chest 1 view portable   Final Result by Jason Gil MD (01/18 5914)      No acute cardiopulmonary disease  Workstation performed: [id number]                    Procedures  Procedures         ED Course  ED Course as of 01/18/22 2317   Tue Jan 18, 2022   0751 SO - gastroparesis, okay to go home once pain meds are given                               SBIRT 22yo+      Most Recent Value   SBIRT (23 yo +)    In order to provide better care to our patients, we are screening all of our patients for alcohol and drug use  Would it be okay to ask you these screening questions? Unable to answer at this time Filed at: 01/18/2022 0546                    MDM    Disposition  Final diagnoses:   Abdominal pain     Time reflects when diagnosis was documented in both MDM as applicable and the Disposition within this note     Time User Action Codes Description Comment    1/18/2022  7:33 AM Hawa Thakur Add [R10 9] Abdominal pain       ED Disposition     ED Disposition Condition Date/Time Comment    Discharge Stable Tue Jan 18, 2022  7:33 AM Montez Casas discharge to home/self care              Follow-up Information     Follow up With Specialties Details Why Contact Info    Dennis Schumacher MD Gastroenterology In 1 day for endoscopy [address] [facility] [address] Bermudez [facility] [address] [facility]  [phone]            Discharge Medication List as of 1/18/2022  7:34 AM      CONTINUE these medications which have NOT CHANGED Details   aspirin 81 mg chewable tablet Chew 81 mg daily, Historical Med      clopidogrel (PLAVIX) 75 mg tablet Take 75 mg by mouth daily, Starting Wed 12/23/2020, Until Thu 12/23/2021, Historical Med      dicyclomine (BENTYL) 20 mg tablet Take 20 mg by mouth 2 (two) times a day, Starting Mon 1/11/2021, Historical Med      levothyroxine 100 mcg tablet Take 100 mcg by mouth daily, Historical Med      metoclopramide (REGLAN) 5 mg tablet Take 1 tablet (5 mg total) by mouth 4 (four) times a day as needed (nausea and vomiting) for up to 21 doses, Starting Thu 5/6/2021, Normal      ondansetron (ZOFRAN) 4 mg tablet Take 1 tablet (4 mg total) by mouth every 8 (eight) hours as needed for nausea or vomiting, Starting Thu 5/6/2021, Normal      pantoprazole (PROTONIX) 40 mg tablet Take 1 tablet (40 mg total) by mouth 2 (two) times a day before meals for 14 days, Starting Thu 5/6/2021, Until Thu 5/20/2021, Normal      PATIENT MAINTAINED INSULIN PUMP Inject 1 each under the skin every 8 (eight) hours, Starting Thu 5/6/2021, No Print      sucralfate (CARAFATE) 1 g tablet Take 1 tablet (1 g total) by mouth 4 (four) times a day (before meals and at bedtime), Starting Thu 5/6/2021, Normal      umeclidinium bromide (Incruse Ellipta) 62 5 mcg/inh AEPB inhaler Inhale 1 Inhaler daily, Starting Mon 4/19/2021, Historical Med             No discharge procedures on file      PDMP Review     None          ED Provider  Electronically Signed by           Fred Webster MD  01/18/22 0834

## 2023-11-20 NOTE — PLAN OF CARE
Problem: Potential for Falls  Goal: Patient will remain free of falls  Description: INTERVENTIONS:  - Assess patient frequently for physical needs  -  Identify cognitive and physical deficits and behaviors that affect risk of falls    -  [place] fall precautions as indicated by assessment   - Educate patient/family on patient safety including physical limitations  - Instruct patient to call for assistance with activity based on assessment  - Modify environment to reduce risk of injury  - Consider OT/PT consult to assist with strengthening/mobility  9/19/2020 1052 by Deepa Polanco RN  Outcome: Adequate for Discharge  9/19/2020 0709 by Deepa Polanco RN  Outcome: Progressing     Problem: PAIN - ADULT  Goal: Verbalizes/displays adequate comfort level or baseline comfort level  Description: Interventions:  - Encourage patient to monitor pain and request assistance  - Assess pain using appropriate pain scale  - Administer analgesics based on type and severity of pain and evaluate response  - Implement non-pharmacological measures as appropriate and evaluate response  - Consider cultural and social influences on pain and pain management  - Notify physician/advanced practitioner if interventions unsuccessful or patient reports new pain  9/19/2020 1052 by Deepa Polanco RN  Outcome: Adequate for Discharge  9/19/2020 0709 by Deepa Polanco RN  Outcome: Progressing     Problem: INFECTION - ADULT  Goal: Absence or prevention of progression during hospitalization  Description: INTERVENTIONS:  - Assess and monitor for signs and symptoms of infection  - Monitor lab/diagnostic results  - Monitor all insertion sites, i e  indwelling lines, tubes, and drains  - Monitor endotracheal if appropriate and nasal secretions for changes in amount and color  - [place] appropriate cooling/warming therapies per order  - Administer medications as ordered  - Instruct and encourage patient and family to use good hand hygiene technique  - Identify and instruct in appropriate isolation precautions for identified infection/condition  9/19/2020 1052 by Jarett Joshi RN  Outcome: Adequate for Discharge  9/19/2020 0709 by Jarett Joshi RN  Outcome: Progressing  Goal: Absence of fever/infection during neutropenic period  Description: INTERVENTIONS:  - Monitor WBC    9/19/2020 1052 by Jarett Joshi RN  Outcome: Adequate for Discharge  9/19/2020 0709 by Jarett Joshi RN  Outcome: Progressing     Problem: SAFETY ADULT  Goal: Maintain or return to baseline ADL function  Description: INTERVENTIONS:  -  Assess patient's ability to carry out ADLs; assess patient's baseline for ADL function and identify physical deficits which impact ability to perform ADLs (bathing, care of mouth/teeth, toileting, grooming, dressing, etc )  - Assess/evaluate cause of self-care deficits   - Assess range of motion  - Assess patient's mobility; develop plan if impaired  - Assess patient's need for assistive devices and provide as appropriate  - Encourage maximum independence but intervene and supervise when necessary  - Involve family in performance of ADLs  - Assess for home care needs following discharge   - Consider OT consult to assist with ADL evaluation and planning for discharge  - Provide patient education as appropriate  9/19/2020 1052 by Jarett Joshi RN  Outcome: Adequate for Discharge  9/19/2020 0709 by Jarett Joshi RN  Outcome: Progressing  Goal: Maintain or return mobility status to optimal level  Description: INTERVENTIONS:  - Assess patient's baseline mobility status (ambulation, transfers, stairs, etc )    - Identify cognitive and physical deficits and behaviors that affect mobility  - Identify mobility aids required to assist with transfers and/or ambulation (gait belt, sit-to-stand, lift, walker, cane, etc )  - [place] fall precautions as indicated by assessment  - Record patient progress and toleration of activity level on Mobility SBAR; progress patient to next Phase/Stage  - Instruct patient to call for assistance with activity based on assessment  - Consider rehabilitation consult to assist with strengthening/weightbearing, etc   9/19/2020 1052 by Pa Hoover RN  Outcome: Adequate for Discharge  9/19/2020 0709 by Pa Hoover RN  Outcome: Progressing     Problem: DISCHARGE PLANNING  Goal: Discharge to home or other facility with appropriate resources  Description: INTERVENTIONS:  - Identify barriers to discharge w/patient and caregiver  - Arrange for needed discharge resources and transportation as appropriate  - Identify discharge learning needs (meds, wound care, etc )  - Arrange for interpretive services to assist at discharge as needed  - Refer to Case Management Department for coordinating discharge planning if the patient needs post-hospital services based on physician/advanced practitioner order or complex needs related to functional status, cognitive ability, or social support system  9/19/2020 1052 by Pa Hoover RN  Outcome: Adequate for Discharge  9/19/2020 0709 by Pa Hoover RN  Outcome: Progressing     Problem: Knowledge Deficit  Goal: Patient/family/caregiver demonstrates understanding of disease process, treatment plan, medications, and discharge instructions  Description: Complete learning assessment and assess knowledge base    Interventions:  - Provide teaching at level of understanding  - Provide teaching via preferred learning methods  9/19/2020 1052 by Pa Hoover RN  Outcome: Adequate for Discharge  9/19/2020 0709 by Pa Hoover RN  Outcome: Progressing stated

## 2023-12-30 ENCOUNTER — APPOINTMENT (EMERGENCY)
Dept: RADIOLOGY | Facility: HOSPITAL | Age: 58
End: 2023-12-30
Payer: MEDICARE

## 2023-12-30 ENCOUNTER — HOSPITAL ENCOUNTER (OUTPATIENT)
Facility: HOSPITAL | Age: 58
Setting detail: OBSERVATION
Discharge: HOME | End: 2023-12-31
Attending: EMERGENCY MEDICINE | Admitting: INTERNAL MEDICINE
Payer: MEDICARE

## 2023-12-30 ENCOUNTER — APPOINTMENT (EMERGENCY)
Dept: CT IMAGING | Facility: HOSPITAL | Age: 58
End: 2023-12-30
Payer: MEDICARE

## 2023-12-30 DIAGNOSIS — R73.9 HYPERGLYCEMIA: ICD-10-CM

## 2023-12-30 DIAGNOSIS — E03.9 HYPOTHYROIDISM: ICD-10-CM

## 2023-12-30 DIAGNOSIS — K29.70 GASTRITIS: ICD-10-CM

## 2023-12-30 DIAGNOSIS — R10.9 ABDOMINAL PAIN: Primary | ICD-10-CM

## 2023-12-30 DIAGNOSIS — R00.1 BRADYCARDIA: ICD-10-CM

## 2023-12-30 DIAGNOSIS — R11.2 INTRACTABLE NAUSEA AND VOMITING: ICD-10-CM

## 2023-12-30 DIAGNOSIS — E10.69 TYPE 1 DIABETES MELLITUS WITH OTHER SPECIFIED COMPLICATION (HCC): ICD-10-CM

## 2023-12-30 PROBLEM — K29.00 ACUTE GASTRITIS: Status: ACTIVE | Noted: 2023-12-30

## 2023-12-30 PROBLEM — E04.1 THYROID NODULE: Status: ACTIVE | Noted: 2023-05-23

## 2023-12-30 PROBLEM — E10.42 DIABETIC POLYNEUROPATHY ASSOCIATED WITH TYPE 1 DIABETES MELLITUS (HCC): Chronic | Status: ACTIVE | Noted: 2020-11-18

## 2023-12-30 PROBLEM — I25.10 CAD S/P PERCUTANEOUS CORONARY ANGIOPLASTY: Chronic | Status: ACTIVE | Noted: 2020-11-13

## 2023-12-30 PROBLEM — Z98.61 CAD S/P PERCUTANEOUS CORONARY ANGIOPLASTY: Chronic | Status: ACTIVE | Noted: 2020-11-13

## 2023-12-30 PROBLEM — G89.29 CHRONIC PAIN OF LEFT KNEE: Status: ACTIVE | Noted: 2023-12-30

## 2023-12-30 PROBLEM — I10 HTN (HYPERTENSION): Status: ACTIVE | Noted: 2023-05-23

## 2023-12-30 PROBLEM — E10.3393 TYPE 1 DIABETES MELLITUS WITH MODERATE NONPROLIFERATIVE RETINOPATHY OF BOTH EYES WITHOUT MACULAR EDEMA (HCC): Status: ACTIVE | Noted: 2020-07-01

## 2023-12-30 PROBLEM — M25.562 CHRONIC PAIN OF LEFT KNEE: Status: ACTIVE | Noted: 2023-12-30

## 2023-12-30 PROBLEM — R10.13 EPIGASTRIC PAIN: Status: ACTIVE | Noted: 2023-12-30

## 2023-12-30 LAB
2HR DELTA HS TROPONIN: 0 NG/L
ABO GROUP BLD: NORMAL
ABO GROUP BLD: NORMAL
ALBUMIN SERPL BCP-MCNC: 4.5 G/DL (ref 3.5–5)
ALP SERPL-CCNC: 82 U/L (ref 34–104)
ALT SERPL W P-5'-P-CCNC: 14 U/L (ref 7–52)
ANION GAP SERPL CALCULATED.3IONS-SCNC: 10 MMOL/L
APTT PPP: 25 SECONDS (ref 23–37)
AST SERPL W P-5'-P-CCNC: 20 U/L (ref 13–39)
BACTERIA UR QL AUTO: ABNORMAL /HPF
BASE EX.OXY STD BLDV CALC-SCNC: 59.6 % (ref 60–80)
BASE EXCESS BLDV CALC-SCNC: 2.2 MMOL/L
BASOPHILS # BLD MANUAL: 0 THOUSAND/UL (ref 0–0.1)
BASOPHILS NFR MAR MANUAL: 0 % (ref 0–1)
BETA-HYDROXYBUTYRATE: 1.3 MMOL/L
BILIRUB SERPL-MCNC: 0.67 MG/DL (ref 0.2–1)
BILIRUB UR QL STRIP: NEGATIVE
BLD GP AB SCN SERPL QL: NEGATIVE
BUN SERPL-MCNC: 12 MG/DL (ref 5–25)
CALCIUM SERPL-MCNC: 9.5 MG/DL (ref 8.4–10.2)
CARDIAC TROPONIN I PNL SERPL HS: 9 NG/L
CARDIAC TROPONIN I PNL SERPL HS: 9 NG/L
CHLORIDE SERPL-SCNC: 98 MMOL/L (ref 96–108)
CLARITY UR: CLEAR
CO2 SERPL-SCNC: 27 MMOL/L (ref 21–32)
COLOR UR: ABNORMAL
CREAT SERPL-MCNC: 0.94 MG/DL (ref 0.6–1.3)
EOSINOPHIL # BLD MANUAL: 0 THOUSAND/UL (ref 0–0.4)
EOSINOPHIL NFR BLD MANUAL: 0 % (ref 0–6)
ERYTHROCYTE [DISTWIDTH] IN BLOOD BY AUTOMATED COUNT: 12.4 % (ref 11.6–15.1)
FLUAV RNA RESP QL NAA+PROBE: NEGATIVE
FLUBV RNA RESP QL NAA+PROBE: NEGATIVE
GFR SERPL CREATININE-BSD FRML MDRD: 90 ML/MIN/1.73SQ M
GLUCOSE SERPL-MCNC: 185 MG/DL (ref 65–140)
GLUCOSE SERPL-MCNC: 200 MG/DL (ref 65–140)
GLUCOSE SERPL-MCNC: 227 MG/DL (ref 65–140)
GLUCOSE SERPL-MCNC: 259 MG/DL (ref 65–140)
GLUCOSE SERPL-MCNC: 271 MG/DL (ref 65–140)
GLUCOSE UR STRIP-MCNC: ABNORMAL MG/DL
HCO3 BLDV-SCNC: 28.1 MMOL/L (ref 24–30)
HCT VFR BLD AUTO: 43 % (ref 36.5–49.3)
HGB BLD-MCNC: 14.5 G/DL (ref 12–17)
HGB UR QL STRIP.AUTO: NEGATIVE
INR PPP: 1.06 (ref 0.84–1.19)
KETONES UR STRIP-MCNC: ABNORMAL MG/DL
LACTATE SERPL-SCNC: 1.4 MMOL/L (ref 0.5–2)
LEUKOCYTE ESTERASE UR QL STRIP: NEGATIVE
LG PLATELETS BLD QL SMEAR: PRESENT
LIPASE SERPL-CCNC: 6 U/L (ref 11–82)
LYMPHOCYTES # BLD AUTO: 0.42 THOUSAND/UL (ref 0.6–4.47)
LYMPHOCYTES # BLD AUTO: 4 % (ref 14–44)
MAGNESIUM SERPL-MCNC: 1.3 MG/DL (ref 1.9–2.7)
MCH RBC QN AUTO: 31.6 PG (ref 26.8–34.3)
MCHC RBC AUTO-ENTMCNC: 33.7 G/DL (ref 31.4–37.4)
MCV RBC AUTO: 94 FL (ref 82–98)
MONOCYTES # BLD AUTO: 0.32 THOUSAND/UL (ref 0–1.22)
MONOCYTES NFR BLD: 3 % (ref 4–12)
MUCOUS THREADS UR QL AUTO: ABNORMAL
NEUTROPHILS # BLD MANUAL: 9.77 THOUSAND/UL (ref 1.85–7.62)
NEUTS BAND NFR BLD MANUAL: 2 % (ref 0–8)
NEUTS SEG NFR BLD AUTO: 91 % (ref 43–75)
NITRITE UR QL STRIP: NEGATIVE
NON-SQ EPI CELLS URNS QL MICRO: ABNORMAL /HPF
O2 CT BLDV-SCNC: 12.1 ML/DL
PCO2 BLDV: 48.6 MM HG (ref 42–50)
PH BLDV: 7.38 [PH] (ref 7.3–7.4)
PH UR STRIP.AUTO: 7 [PH]
PLATELET # BLD AUTO: 203 THOUSANDS/UL (ref 149–390)
PLATELET BLD QL SMEAR: ADEQUATE
PMV BLD AUTO: 10.7 FL (ref 8.9–12.7)
PO2 BLDV: 31.1 MM HG (ref 35–45)
POTASSIUM SERPL-SCNC: 4 MMOL/L (ref 3.5–5.3)
PROT SERPL-MCNC: 8.1 G/DL (ref 6.4–8.4)
PROT UR STRIP-MCNC: ABNORMAL MG/DL
PROTHROMBIN TIME: 14.4 SECONDS (ref 11.6–14.5)
RBC # BLD AUTO: 4.59 MILLION/UL (ref 3.88–5.62)
RBC #/AREA URNS AUTO: ABNORMAL /HPF
RBC MORPH BLD: NORMAL
RH BLD: POSITIVE
RH BLD: POSITIVE
RSV RNA RESP QL NAA+PROBE: NEGATIVE
SARS-COV-2 RNA RESP QL NAA+PROBE: NEGATIVE
SODIUM SERPL-SCNC: 135 MMOL/L (ref 135–147)
SP GR UR STRIP.AUTO: 1.04 (ref 1–1.03)
SPECIMEN EXPIRATION DATE: NORMAL
UROBILINOGEN UR STRIP-ACNC: <2 MG/DL
WBC # BLD AUTO: 10.51 THOUSAND/UL (ref 4.31–10.16)
WBC #/AREA URNS AUTO: ABNORMAL /HPF

## 2023-12-30 PROCEDURE — 81001 URINALYSIS AUTO W/SCOPE: CPT

## 2023-12-30 PROCEDURE — 96366 THER/PROPH/DIAG IV INF ADDON: CPT

## 2023-12-30 PROCEDURE — 85730 THROMBOPLASTIN TIME PARTIAL: CPT

## 2023-12-30 PROCEDURE — 99223 1ST HOSP IP/OBS HIGH 75: CPT | Performed by: INTERNAL MEDICINE

## 2023-12-30 PROCEDURE — 93005 ELECTROCARDIOGRAM TRACING: CPT

## 2023-12-30 PROCEDURE — 71045 X-RAY EXAM CHEST 1 VIEW: CPT

## 2023-12-30 PROCEDURE — 96368 THER/DIAG CONCURRENT INF: CPT

## 2023-12-30 PROCEDURE — 96365 THER/PROPH/DIAG IV INF INIT: CPT

## 2023-12-30 PROCEDURE — 85610 PROTHROMBIN TIME: CPT

## 2023-12-30 PROCEDURE — 36415 COLL VENOUS BLD VENIPUNCTURE: CPT

## 2023-12-30 PROCEDURE — 83690 ASSAY OF LIPASE: CPT

## 2023-12-30 PROCEDURE — 74174 CTA ABD&PLVS W/CONTRAST: CPT

## 2023-12-30 PROCEDURE — 86900 BLOOD TYPING SEROLOGIC ABO: CPT

## 2023-12-30 PROCEDURE — 82010 KETONE BODYS QUAN: CPT

## 2023-12-30 PROCEDURE — 82948 REAGENT STRIP/BLOOD GLUCOSE: CPT

## 2023-12-30 PROCEDURE — 87040 BLOOD CULTURE FOR BACTERIA: CPT

## 2023-12-30 PROCEDURE — 86901 BLOOD TYPING SEROLOGIC RH(D): CPT

## 2023-12-30 PROCEDURE — 85027 COMPLETE CBC AUTOMATED: CPT

## 2023-12-30 PROCEDURE — 0241U HB NFCT DS VIR RESP RNA 4 TRGT: CPT

## 2023-12-30 PROCEDURE — 71275 CT ANGIOGRAPHY CHEST: CPT

## 2023-12-30 PROCEDURE — 80053 COMPREHEN METABOLIC PANEL: CPT

## 2023-12-30 PROCEDURE — 99285 EMERGENCY DEPT VISIT HI MDM: CPT | Performed by: EMERGENCY MEDICINE

## 2023-12-30 PROCEDURE — 85007 BL SMEAR W/DIFF WBC COUNT: CPT

## 2023-12-30 PROCEDURE — 84484 ASSAY OF TROPONIN QUANT: CPT

## 2023-12-30 PROCEDURE — 99285 EMERGENCY DEPT VISIT HI MDM: CPT

## 2023-12-30 PROCEDURE — 82805 BLOOD GASES W/O2 SATURATION: CPT

## 2023-12-30 PROCEDURE — 86850 RBC ANTIBODY SCREEN: CPT

## 2023-12-30 PROCEDURE — 83605 ASSAY OF LACTIC ACID: CPT

## 2023-12-30 PROCEDURE — 96375 TX/PRO/DX INJ NEW DRUG ADDON: CPT

## 2023-12-30 PROCEDURE — 83735 ASSAY OF MAGNESIUM: CPT

## 2023-12-30 RX ORDER — LISINOPRIL 2.5 MG/1
2.5 TABLET ORAL DAILY
COMMUNITY
Start: 2023-08-21 | End: 2024-08-20

## 2023-12-30 RX ORDER — METOCLOPRAMIDE HYDROCHLORIDE 5 MG/ML
10 INJECTION INTRAMUSCULAR; INTRAVENOUS EVERY 6 HOURS PRN
Status: DISCONTINUED | OUTPATIENT
Start: 2023-12-30 | End: 2023-12-31 | Stop reason: HOSPADM

## 2023-12-30 RX ORDER — LISINOPRIL 2.5 MG/1
2.5 TABLET ORAL DAILY
Status: DISCONTINUED | OUTPATIENT
Start: 2023-12-31 | End: 2023-12-31 | Stop reason: HOSPADM

## 2023-12-30 RX ORDER — MAGNESIUM SULFATE HEPTAHYDRATE 40 MG/ML
4 INJECTION, SOLUTION INTRAVENOUS ONCE
Status: COMPLETED | OUTPATIENT
Start: 2023-12-30 | End: 2023-12-30

## 2023-12-30 RX ORDER — SODIUM CHLORIDE, SODIUM GLUCONATE, SODIUM ACETATE, POTASSIUM CHLORIDE, MAGNESIUM CHLORIDE, SODIUM PHOSPHATE, DIBASIC, AND POTASSIUM PHOSPHATE .53; .5; .37; .037; .03; .012; .00082 G/100ML; G/100ML; G/100ML; G/100ML; G/100ML; G/100ML; G/100ML
100 INJECTION, SOLUTION INTRAVENOUS CONTINUOUS
Status: DISCONTINUED | OUTPATIENT
Start: 2023-12-30 | End: 2023-12-31

## 2023-12-30 RX ORDER — HYDROXYZINE HYDROCHLORIDE 25 MG/1
25 TABLET, FILM COATED ORAL EVERY 6 HOURS PRN
COMMUNITY
Start: 2023-11-02

## 2023-12-30 RX ORDER — FAMOTIDINE 20 MG/1
20 TABLET, FILM COATED ORAL DAILY
Status: DISCONTINUED | OUTPATIENT
Start: 2023-12-30 | End: 2023-12-31

## 2023-12-30 RX ORDER — HYDROMORPHONE HCL/PF 1 MG/ML
0.5 SYRINGE (ML) INJECTION ONCE
Status: COMPLETED | OUTPATIENT
Start: 2023-12-30 | End: 2023-12-30

## 2023-12-30 RX ORDER — OXYCODONE HYDROCHLORIDE 15 MG/1
15 TABLET ORAL EVERY 6 HOURS PRN
COMMUNITY
Start: 2023-12-20

## 2023-12-30 RX ORDER — BUSPIRONE HYDROCHLORIDE 15 MG/1
15 TABLET ORAL 2 TIMES DAILY
COMMUNITY
Start: 2023-02-08 | End: 2024-02-08

## 2023-12-30 RX ORDER — BUSPIRONE HYDROCHLORIDE 5 MG/1
15 TABLET ORAL 2 TIMES DAILY
Status: DISCONTINUED | OUTPATIENT
Start: 2023-12-30 | End: 2023-12-31 | Stop reason: HOSPADM

## 2023-12-30 RX ORDER — ONDANSETRON 2 MG/ML
4 INJECTION INTRAMUSCULAR; INTRAVENOUS ONCE
Status: COMPLETED | OUTPATIENT
Start: 2023-12-30 | End: 2023-12-30

## 2023-12-30 RX ORDER — MAGNESIUM HYDROXIDE/ALUMINUM HYDROXICE/SIMETHICONE 120; 1200; 1200 MG/30ML; MG/30ML; MG/30ML
30 SUSPENSION ORAL ONCE
Status: DISCONTINUED | OUTPATIENT
Start: 2023-12-30 | End: 2023-12-31

## 2023-12-30 RX ORDER — HYDROXYZINE HYDROCHLORIDE 25 MG/1
25 TABLET, FILM COATED ORAL EVERY 6 HOURS PRN
Status: DISCONTINUED | OUTPATIENT
Start: 2023-12-30 | End: 2023-12-31 | Stop reason: HOSPADM

## 2023-12-30 RX ORDER — KETOROLAC TROMETHAMINE 30 MG/ML
15 INJECTION, SOLUTION INTRAMUSCULAR; INTRAVENOUS ONCE
Status: COMPLETED | OUTPATIENT
Start: 2023-12-30 | End: 2023-12-30

## 2023-12-30 RX ORDER — FOLIC ACID 1 MG/1
1 TABLET ORAL DAILY
COMMUNITY
Start: 2023-06-06 | End: 2023-12-31

## 2023-12-30 RX ORDER — SODIUM CHLORIDE, SODIUM GLUCONATE, SODIUM ACETATE, POTASSIUM CHLORIDE, MAGNESIUM CHLORIDE, SODIUM PHOSPHATE, DIBASIC, AND POTASSIUM PHOSPHATE .53; .5; .37; .037; .03; .012; .00082 G/100ML; G/100ML; G/100ML; G/100ML; G/100ML; G/100ML; G/100ML
1000 INJECTION, SOLUTION INTRAVENOUS ONCE
Status: COMPLETED | OUTPATIENT
Start: 2023-12-30 | End: 2023-12-30

## 2023-12-30 RX ORDER — PANTOPRAZOLE SODIUM 40 MG/1
40 TABLET, DELAYED RELEASE ORAL
Status: DISCONTINUED | OUTPATIENT
Start: 2023-12-31 | End: 2023-12-31 | Stop reason: HOSPADM

## 2023-12-30 RX ORDER — ACETAMINOPHEN 325 MG/1
650 TABLET ORAL EVERY 6 HOURS PRN
Status: DISCONTINUED | OUTPATIENT
Start: 2023-12-30 | End: 2023-12-31 | Stop reason: HOSPADM

## 2023-12-30 RX ORDER — MAGNESIUM SULFATE HEPTAHYDRATE 40 MG/ML
2 INJECTION, SOLUTION INTRAVENOUS ONCE
Status: COMPLETED | OUTPATIENT
Start: 2023-12-30 | End: 2023-12-30

## 2023-12-30 RX ORDER — METOCLOPRAMIDE HYDROCHLORIDE 5 MG/ML
10 INJECTION INTRAMUSCULAR; INTRAVENOUS ONCE
Status: COMPLETED | OUTPATIENT
Start: 2023-12-30 | End: 2023-12-30

## 2023-12-30 RX ORDER — ENOXAPARIN SODIUM 100 MG/ML
40 INJECTION SUBCUTANEOUS DAILY
Status: DISCONTINUED | OUTPATIENT
Start: 2023-12-31 | End: 2023-12-31 | Stop reason: HOSPADM

## 2023-12-30 RX ORDER — LEVOTHYROXINE SODIUM 112 UG/1
112 TABLET ORAL
Status: DISCONTINUED | OUTPATIENT
Start: 2023-12-31 | End: 2023-12-31 | Stop reason: HOSPADM

## 2023-12-30 RX ADMIN — IOHEXOL 100 ML: 350 INJECTION, SOLUTION INTRAVENOUS at 15:30

## 2023-12-30 RX ADMIN — SODIUM CHLORIDE 5 UNITS/HR: 9 INJECTION, SOLUTION INTRAVENOUS at 19:38

## 2023-12-30 RX ADMIN — ONDANSETRON 4 MG: 2 INJECTION INTRAMUSCULAR; INTRAVENOUS at 14:06

## 2023-12-30 RX ADMIN — OXYCODONE HYDROCHLORIDE 15 MG: 10 TABLET ORAL at 19:44

## 2023-12-30 RX ADMIN — HYDROMORPHONE HYDROCHLORIDE 0.5 MG: 1 INJECTION, SOLUTION INTRAMUSCULAR; INTRAVENOUS; SUBCUTANEOUS at 14:06

## 2023-12-30 RX ADMIN — MAGNESIUM SULFATE 4 G: 4 INJECTION INTRAVENOUS at 19:48

## 2023-12-30 RX ADMIN — METOCLOPRAMIDE 10 MG: 5 INJECTION, SOLUTION INTRAMUSCULAR; INTRAVENOUS at 15:58

## 2023-12-30 RX ADMIN — OXYCODONE HYDROCHLORIDE 15 MG: 10 TABLET ORAL at 23:59

## 2023-12-30 RX ADMIN — SODIUM CHLORIDE, SODIUM GLUCONATE, SODIUM ACETATE, POTASSIUM CHLORIDE, MAGNESIUM CHLORIDE, SODIUM PHOSPHATE, DIBASIC, AND POTASSIUM PHOSPHATE 100 ML/HR: .53; .5; .37; .037; .03; .012; .00082 INJECTION, SOLUTION INTRAVENOUS at 19:46

## 2023-12-30 RX ADMIN — SODIUM CHLORIDE, SODIUM GLUCONATE, SODIUM ACETATE, POTASSIUM CHLORIDE, MAGNESIUM CHLORIDE, SODIUM PHOSPHATE, DIBASIC, AND POTASSIUM PHOSPHATE 1000 ML: .53; .5; .37; .037; .03; .012; .00082 INJECTION, SOLUTION INTRAVENOUS at 14:36

## 2023-12-30 RX ADMIN — MAGNESIUM SULFATE HEPTAHYDRATE 2 G: 40 INJECTION, SOLUTION INTRAVENOUS at 15:34

## 2023-12-30 RX ADMIN — KETOROLAC TROMETHAMINE 15 MG: 30 INJECTION, SOLUTION INTRAMUSCULAR; INTRAVENOUS at 15:58

## 2023-12-30 NOTE — ASSESSMENT & PLAN NOTE
Lab Results   Component Value Date    HGBA1C 9.0 (H) 10/02/2023       Recent Labs     12/30/23  1600   POCGLU 227*       Blood Sugar Average: Last 72 hrs:  (P) 227  PTA on insulin pump, [facility]   Possibly on Lantus 30 units nightly  Sugars  with betaOHbutyrate 1.3. No AGMA.     Plan:  Endocrinology consulted - recommendations appreciated  At this time we will start patient on insulin gtt  Q2h accuchecks while on gtt  Hypoglycemia protocol

## 2023-12-30 NOTE — H&P
[facility]  H&P  Name: Brian Butterfield 58 y.o. male I MRN: [id number]  Unit/Bed#: ED-16 I Date of Admission: 12/30/2023   Date of Service: 12/30/2023 I Hospital Day: 0      Assessment/Plan   * Epigastric pain  Assessment & Plan  POA: 10/10 epigastric pain with associated burning chest pain and acid reflux.   Patient reports long history of acid reflux for which he takes TUMS at home. Was previously taking Protonix 40mg BID per chart review.   Also has gastroparesis in the setting of T1DM.   Reports improvement in symptoms after antiemetics and dilaudid.   Suspect acute gastritis vs gastroparesis exacerbated by opioid use vs cannabis hyperemesis syndrome      Plan:  Will order Mylanta for patient.  Famotidine 20mg qhs ordered  Protonix 40mg daily ordered - will likely discharge patient on a trial of PPI.   Started patient on IV fluids - PlasmaLyte 100mL/h x 10 hours.   Rest of plan as below for nausea and vomiting.     Bradycardia  Assessment & Plan  POA HR 49, ecg demonstrating sinus bradycardia, incomplete RBBB and left axis deviation. Patient denies dizziness or lightheadedness, LoC.   On chart review this appears to be chronic with heart rates in the 40s and 50s.   He has extensive cardiac history including CAD with PCA in 2020 and 2021.   Follows up with [facility] cardiology.   Seems to have previously been on metoprolol but does not seem to be taking it at this time.   Troponin WNL    Plan:  Will monitor patient on telemetry overnight.   Consider cardiology consult if he becomes symptomatic or telemetry reveals abnormalities.     Intractable nausea and vomiting  Assessment & Plan  POA: Epigastric pain with associated intractable nausea and vomiting.   Likely in the setting of acute gastritis vs gastroparesis complicated by opioid use vs cannabis hyperemesis syndrome       Plan:  Reglan 10mg q6h PRN ordered  Will order Mylanta for patient  Famotidine 20mg qhs ordered  Protonix 40mg daily  ordered - anticipate discharging patient on a trial of PPI  Will start IV hydration with PlasmaLyte 100mL/h x 10 hours.  Rest of plan as above.     Chronic pain of left knee  Assessment & Plan   He has a significant left knee pain and deformity related to severe fracture and postoperative infection with MRSA. His fracture has gone on to heal and his infection is resolved but he is left with posttraumatic arthritis as well as hardware irritation.  Chronically on Oxycodone 15mg q6h PRN ordered by Dr. Villasenor - [facility]    Plan;  Discussed with patient that this may contribute to his abdominal pain considering he also has history of gastroparesis.   At this time will continue PTA Oxycodone 15mg q6h PRN    HTN (hypertension)  Assessment & Plan  Continue PTA Lisinopril 2.5mg daily.       Hyperlipidemia  Assessment & Plan  No statin on file, patient has Alicorumab ordered by his cardiologist and he is waiting for it in the mail.     CAD S/P percutaneous coronary angioplasty  Assessment & Plan  History of significant CAD, s/p ABDIAS x 4 in 2020 and 2021.   PTA on ASA and Plavix, however was told to hold this until his carpal tunnel repair surgery on 1/5/23    Plan:  At this time will hold ASA and Plavix      Hypothyroidism  Assessment & Plan  Continue PTA Levothyroxine 112 mcg daily           VTE Pharmacologic Prophylaxis: VTE Score: 8 Moderate Risk (Score 3-4) - Pharmacological DVT Prophylaxis Ordered: enoxaparin (Lovenox).  Code Status: Level 1 - Full Code   Discussion with family: Patient declined call to .     Anticipated Length of Stay: Patient will be admitted on an observation basis with an anticipated length of stay of less than 2 midnights secondary to intractable nausea and vomiting.    Chief Complaint: Abdominal pain, intractable nausea and vomiting    History of Present Illness:  Brian Butterfield is a 58 y.o. male with a PMH of CAD s/p PCA in 2020 and 2021, T2DM on insulin pump,  hypothyroidism who presents with a one day history of epigastric pain and associated nausea and vomiting. Patient reports being in his usual state of health until this morning when he woke up with severe 10/10 burning epigastric pain radiating to his chest and associated with acid reflux. He also reports intractable nausea and states that he was mostly dry heaving, noted scant amounts of blood in the emesis. Denies alleviating or aggravating symptoms, states that he tried taking TUMS with no relief. Patient has a history of similar symptoms which at the time was thought to be due to cannabis hyperemesis syndrome. At this time he reports smoking marijuana occasionally, last time being 12/28. He also takes Oxycodone 15mg q6h PRN for chronic knee pain.   In the ED patient was noted to be bradycardic with HR in the high 40s, with labs demonstrating a mild leukocytosis. Sugar was noted to be 259, with beta hydroxybutirate of 1.3, but no acidosis. CXR and CTA C/A/P were negative for aortic dissection or other abnormalities. Patient received IV dilaudid, 15mg of Toradol, antiemetics and admitted to [facility] for further management.    Review of Systems:  Review of Systems   Constitutional:  Negative for activity change, appetite change, chills, fever and unexpected weight change.   HENT:  Negative for congestion, postnasal drip, rhinorrhea, sinus pressure and sore throat.    Eyes:  Negative for photophobia and visual disturbance.   Respiratory:  Negative for cough, chest tightness, shortness of breath and wheezing.    Cardiovascular:  Positive for chest pain (with associated regurgitation). Negative for palpitations and leg swelling.   Gastrointestinal:  Positive for abdominal pain, nausea and vomiting. Negative for abdominal distention, constipation and diarrhea.   Endocrine: Negative for polydipsia and polyuria.   Genitourinary:  Negative for difficulty urinating, dysuria, frequency and hematuria.   Musculoskeletal:  Positive  for arthralgias (knee pain, chronic). Negative for back pain and myalgias.   Skin:  Negative for color change, pallor and rash.   Neurological:  Negative for dizziness, facial asymmetry, weakness, light-headedness, numbness and headaches.   Psychiatric/Behavioral:  Negative for agitation, behavioral problems, confusion and dysphoric mood.    All other systems reviewed and are negative.      Past Medical and Surgical History:   Past Medical History:   Diagnosis Date    Coronary artery disease     3 stents     Diabetes mellitus (HCC)        Past Surgical History:   Procedure Laterality Date    KNEE SURGERY         Meds/Allergies:  Prior to Admission medications    Medication Sig Start Date End Date Taking? Authorizing Provider   aspirin 81 mg chewable tablet Chew 81 mg daily   Yes Historical Provider, MD   busPIRone (BUSPAR) 15 mg tablet Take 15 mg by mouth 2 (two) times a day 2/8/23 2/8/24 Yes Historical Provider, MD   hydrOXYzine HCL (ATARAX) 25 mg tablet Take 25 mg by mouth every 6 (six) hours as needed 11/2/23  Yes Historical Provider, MD   levothyroxine 100 mcg tablet Take 100 mcg by mouth daily   Yes Historical Provider, MD   lisinopril (ZESTRIL) 2.5 mg tablet Take 2.5 mg by mouth daily 8/21/23 8/20/24 Yes Historical Provider, MD   oxyCODONE (ROXICODONE) 15 mg immediate release tablet Take 15 mg by mouth every 6 (six) hours as needed for moderate pain or severe pain (For knee pain) 12/20/23  Yes Historical Provider, MD   PATIENT MAINTAINED INSULIN PUMP Inject 1 each under the skin every 8 (eight) hours 5/6/21  Yes Mario Landaverde MD   umeclidinium bromide (Incruse Ellipta) 62.5 mcg/inh AEPB inhaler Inhale 1 Inhaler daily 4/19/21  Yes Historical Provider, MD   clopidogrel (PLAVIX) 75 mg tablet Take 75 mg by mouth daily 12/23/20 12/23/21  Historical Provider, MD   dicyclomine (BENTYL) 20 mg tablet Take 20 mg by mouth 2 (two) times a day  Patient not taking: Reported on 12/30/2023 1/11/21   Historical  "Provider, MD   folic acid (FOLVITE) 1 mg tablet Take 1 mg by mouth daily  Patient not taking: Reported on 12/30/2023 6/6/23 6/5/24  Historical Provider, MD   metoclopramide (REGLAN) 5 mg tablet Take 1 tablet (5 mg total) by mouth 4 (four) times a day as needed (nausea and vomiting) for up to 21 doses  Patient not taking: Reported on 12/30/2023 5/6/21   Mario Landaverde MD   ondansetron (ZOFRAN) 4 mg tablet Take 1 tablet (4 mg total) by mouth every 8 (eight) hours as needed for nausea or vomiting  Patient not taking: Reported on 12/30/2023 5/6/21   Mario Landaverde MD   pantoprazole (PROTONIX) 40 mg tablet Take 1 tablet (40 mg total) by mouth 2 (two) times a day before meals for 14 days 5/6/21 5/20/21  Mario Landaverde MD   sucralfate (CARAFATE) 1 g tablet Take 1 tablet (1 g total) by mouth 4 (four) times a day (before meals and at bedtime)  Patient not taking: Reported on 12/30/2023 5/6/21   Mario Landaverde MD     I have reviewed home medications with patient personally.    Allergies:   Allergies   Allergen Reactions    Shellfish-Derived Products - Food Allergy Hives       Social History:  Marital Status:    Patient Pre-hospital Living Situation: Home  Patient Pre-hospital Level of Mobility: walks  Patient Pre-hospital Diet Restrictions: Low carb diet  Substance Use History:   Social History     Substance and Sexual Activity   Alcohol Use Not Currently     Social History     Tobacco Use   Smoking Status Never   Smokeless Tobacco Never     Social History     Substance and Sexual Activity   Drug Use Yes    Types: Marijuana    Comment: last use 2 days ago       Family History:  History reviewed. No pertinent family history.    Physical Exam:     Vitals:   Blood Pressure: 116/60 (12/30/23 1900)  Pulse: (!) 53 (12/30/23 1900)  Temperature: 97.9 °F (36.6 °C) (12/30/23 1340)  Temp Source: Oral (12/30/23 1340)  Respirations: 18 (12/30/23 1900)  Height: 5' 5\" (165.1 cm) (12/30/23 " 1600)  Weight - Scale: 68.5 kg (151 lb 0.2 oz) (12/30/23 1600)  SpO2: 95 % (12/30/23 1900)    Physical Exam  Vitals and nursing note reviewed.   Constitutional:       General: He is not in acute distress.     Appearance: Normal appearance. He is not ill-appearing, toxic-appearing or diaphoretic.   HENT:      Head: Normocephalic and atraumatic.      Nose: Nose normal.      Mouth/Throat:      Mouth: Mucous membranes are moist.      Pharynx: Oropharynx is clear.   Eyes:      General: No scleral icterus.        Right eye: No discharge.         Left eye: No discharge.      Extraocular Movements: Extraocular movements intact.      Conjunctiva/sclera: Conjunctivae normal.   Cardiovascular:      Rate and Rhythm: Regular rhythm. Bradycardia present.      Pulses: Normal pulses.      Heart sounds: Normal heart sounds. No murmur heard.  Pulmonary:      Effort: Pulmonary effort is normal. No respiratory distress.      Breath sounds: Normal breath sounds. No wheezing, rhonchi or rales.   Abdominal:      General: Abdomen is flat. Bowel sounds are normal. There is no distension.      Palpations: Abdomen is soft.      Tenderness: There is abdominal tenderness (epigastric). There is no guarding or rebound.   Musculoskeletal:      Cervical back: Normal range of motion and neck supple.      Right lower leg: No edema.      Left lower leg: No edema.   Skin:     General: Skin is warm and dry.      Coloration: Skin is not jaundiced or pale.   Neurological:      Mental Status: He is alert and oriented to person, place, and time. Mental status is at baseline.   Psychiatric:         Mood and Affect: Mood normal.         Behavior: Behavior normal.         Thought Content: Thought content normal.         Judgment: Judgment normal.          Additional Data:     Lab Results:  Results from last 7 days   Lab Units 12/30/23  1411   WBC Thousand/uL 10.51*   HEMOGLOBIN g/dL 14.5   HEMATOCRIT % 43.0   PLATELETS Thousands/uL 203   BANDS PCT % 2   LYMPHO  PCT % 4*   MONO PCT % 3*   EOS PCT % 0     Results from last 7 days   Lab Units 12/30/23  1411   SODIUM mmol/L 135   POTASSIUM mmol/L 4.0   CHLORIDE mmol/L 98   CO2 mmol/L 27   BUN mg/dL 12   CREATININE mg/dL 0.94   ANION GAP mmol/L 10   CALCIUM mg/dL 9.5   ALBUMIN g/dL 4.5   TOTAL BILIRUBIN mg/dL 0.67   ALK PHOS U/L 82   ALT U/L 14   AST U/L 20   GLUCOSE RANDOM mg/dL 259*     Results from last 7 days   Lab Units 12/30/23  1411   INR  1.06     Results from last 7 days   Lab Units 12/30/23  1938 12/30/23  1600   POC GLUCOSE mg/dl 271* 227*         Results from last 7 days   Lab Units 12/30/23  1411   LACTIC ACID mmol/L 1.4       Lines/Drains:  Invasive Devices       Peripheral Intravenous Line  Duration             Peripheral IV 12/30/23 Left Antecubital <1 day    Peripheral IV 12/30/23 Left Forearm <1 day                        Imaging: Reviewed radiology reports from this admission including: chest xray, chest CT scan, and abdominal/pelvic CT  CTA dissection protocol chest abdomen pelvis w wo contrast   Final Result by Marvin Rock MD (12/30 5692)      1. No acute findings in the chest, abdomen, or pelvis. No aortic dissection or intramural hematoma.                  Workstation performed: [id number]         XR chest 1 view portable   ED Interpretation by Zach Knight MD (12/30 4632)   My personal interpretation-no acute cardiopulmonary disease appreciated.          EKG and Other Studies Reviewed on Admission:   EKG: Sinus Bradycardia. HR 49. Right axis deviation, incomplete RBBB.    ** Please Note: This note has been constructed using a voice recognition system. **

## 2023-12-30 NOTE — ED PROVIDER NOTES
History  Chief Complaint   Patient presents with    Weakness - Generalized     Pt reports weakness, abd pain, vomiting blood, pain with inpsiration, pale in triage     58-year-old male with history of ACS with 3 previous stents, diabetes, non-Hodgkin lymphoma currently in remission presenting due to acute onset severe abdominal pain, weakness, vomiting with trace amounts of blood and chest pain.  Patient states all symptoms started this morning when he woke up and last night he was in his normal state of health.  No recent illnesses.  Patient states he has had uncontrollable vomiting with small amounts of blood as well as pain in his chest that is radiating up to his neck more so on the right side.  Patient denies any diarrhea, dysuria.  Severe abdominal pain, diffusely.  Patient does appear pale on examination but answering questions appropriately.        Prior to Admission Medications   Prescriptions Last Dose Informant Patient Reported? Taking?   PATIENT MAINTAINED INSULIN PUMP 12/30/2023  No Yes   Sig: Inject 1 each under the skin every 8 (eight) hours   aspirin 81 mg chewable tablet 12/29/2023 Self Yes Yes   Sig: Chew 81 mg daily   busPIRone (BUSPAR) 15 mg tablet 12/29/2023  Yes Yes   Sig: Take 15 mg by mouth 2 (two) times a day   clopidogrel (PLAVIX) 75 mg tablet   Yes No   Sig: Take 75 mg by mouth daily   dicyclomine (BENTYL) 20 mg tablet Not Taking  Yes No   Sig: Take 20 mg by mouth 2 (two) times a day   Patient not taking: Reported on 12/30/2023   folic acid (FOLVITE) 1 mg tablet Not Taking  Yes No   Sig: Take 1 mg by mouth daily   Patient not taking: Reported on 12/30/2023   hydrOXYzine HCL (ATARAX) 25 mg tablet 12/29/2023  Yes Yes   Sig: Take 25 mg by mouth every 6 (six) hours as needed   levothyroxine 100 mcg tablet 12/29/2023 Self Yes Yes   Sig: Take 100 mcg by mouth daily   lisinopril (ZESTRIL) 2.5 mg tablet 12/29/2023  Yes Yes   Sig: Take 2.5 mg by mouth daily   metoclopramide (REGLAN) 5 mg tablet Not  Taking  No No   Sig: Take 1 tablet (5 mg total) by mouth 4 (four) times a day as needed (nausea and vomiting) for up to 21 doses   Patient not taking: Reported on 12/30/2023   ondansetron (ZOFRAN) 4 mg tablet Not Taking  No No   Sig: Take 1 tablet (4 mg total) by mouth every 8 (eight) hours as needed for nausea or vomiting   Patient not taking: Reported on 12/30/2023   oxyCODONE (ROXICODONE) 15 mg immediate release tablet 12/29/2023  Yes Yes   Sig: Take 15 mg by mouth every 6 (six) hours as needed for moderate pain or severe pain (For knee pain)   pantoprazole (PROTONIX) 40 mg tablet   No No   Sig: Take 1 tablet (40 mg total) by mouth 2 (two) times a day before meals for 14 days   sucralfate (CARAFATE) 1 g tablet Not Taking  No No   Sig: Take 1 tablet (1 g total) by mouth 4 (four) times a day (before meals and at bedtime)   Patient not taking: Reported on 12/30/2023   umeclidinium bromide (Incruse Ellipta) 62.5 mcg/inh AEPB inhaler 12/29/2023  Yes Yes   Sig: Inhale 1 Inhaler daily      Facility-Administered Medications: None       Past Medical History:   Diagnosis Date    Coronary artery disease     3 stents     Diabetes mellitus (HCC)        Past Surgical History:   Procedure Laterality Date    KNEE SURGERY         History reviewed. No pertinent family history.  I have reviewed and agree with the history as documented.    E-Cigarette/Vaping    E-Cigarette Use Never User      E-Cigarette/Vaping Substances    Nicotine No     THC Yes     CBD No      Social History     Tobacco Use    Smoking status: Never    Smokeless tobacco: Never   Vaping Use    Vaping status: Never Used   Substance Use Topics    Alcohol use: Not Currently    Drug use: Yes     Types: Marijuana     Comment: last use 2 days ago        Review of Systems   Constitutional:  Positive for chills. Negative for fever.   HENT:  Negative for ear pain and sore throat.    Eyes:  Negative for pain and visual disturbance.   Respiratory:  Positive for cough.  Negative for shortness of breath.    Cardiovascular:  Positive for chest pain. Negative for palpitations.   Gastrointestinal:  Positive for abdominal pain, nausea and vomiting.   Genitourinary:  Positive for flank pain. Negative for dysuria and hematuria.   Musculoskeletal:  Positive for back pain, myalgias and neck pain. Negative for arthralgias and neck stiffness.   Skin:  Negative for color change and rash.   Neurological:  Positive for weakness, light-headedness and headaches. Negative for dizziness, seizures, syncope and facial asymmetry.   All other systems reviewed and are negative.      Physical Exam  ED Triage Vitals [12/30/23 1340]   Temperature Pulse Respirations Blood Pressure SpO2   97.9 °F (36.6 °C) (!) 51 18 166/76 100 %      Temp Source Heart Rate Source Patient Position - Orthostatic VS BP Location FiO2 (%)   Oral Monitor Sitting Right arm --      Pain Score       10 - Worst Possible Pain             Orthostatic Vital Signs  Vitals:    12/30/23 1945 12/30/23 2025 12/31/23 0700 12/31/23 1544   BP: 118/57 135/62 106/59 130/62   Pulse: (!) 51 56 63 59   Patient Position - Orthostatic VS: Lying Lying Lying Lying       Physical Exam  Vitals and nursing note reviewed.   Constitutional:       General: He is in acute distress.      Appearance: He is well-developed. He is ill-appearing.      Comments: Pale appearing on arrival, moving around in bed unable to get comfortable.   HENT:      Head: Normocephalic and atraumatic.      Nose: Nose normal.      Mouth/Throat:      Mouth: Mucous membranes are moist.      Pharynx: Oropharynx is clear. Posterior oropharyngeal erythema present. No oropharyngeal exudate.   Eyes:      Extraocular Movements: Extraocular movements intact.      Conjunctiva/sclera: Conjunctivae normal.      Pupils: Pupils are equal, round, and reactive to light.   Cardiovascular:      Rate and Rhythm: Normal rate and regular rhythm.      Pulses: Normal pulses.      Heart sounds: Normal heart  sounds. No murmur heard.     Comments: Tenderness to right chest  Pulmonary:      Effort: Pulmonary effort is normal. No respiratory distress.      Breath sounds: Normal breath sounds. No wheezing, rhonchi or rales.   Chest:      Chest wall: No tenderness.   Abdominal:      General: Abdomen is flat. Bowel sounds are normal. There is no distension.      Palpations: Abdomen is soft.      Tenderness: There is abdominal tenderness. There is right CVA tenderness and left CVA tenderness.      Comments: Diffuse abd pain w/ b/l CVA tenderness   Musculoskeletal:         General: No deformity or signs of injury. Normal range of motion.      Cervical back: Normal range of motion and neck supple. No rigidity or tenderness.   Skin:     General: Skin is warm and dry.      Findings: No bruising, lesion or rash.   Neurological:      General: No focal deficit present.      Mental Status: He is alert.      Cranial Nerves: No cranial nerve deficit.      Sensory: No sensory deficit.         ED Medications  Medications   multi-electrolyte (ISOLYTE-S PH 7.4) bolus 1,000 mL (0 mL Intravenous Stopped 12/30/23 1751)   ondansetron (ZOFRAN) injection 4 mg (4 mg Intravenous Given 12/30/23 1406)   HYDROmorphone (DILAUDID) injection 0.5 mg (0.5 mg Intravenous Given 12/30/23 1406)   magnesium sulfate 2 g/50 mL IVPB (premix) 2 g (0 g Intravenous Stopped 12/30/23 1751)   iohexol (OMNIPAQUE) 350 MG/ML injection (MULTI-DOSE) 100 mL (100 mL Intravenous Given 12/30/23 1530)   metoclopramide (REGLAN) injection 10 mg (10 mg Intravenous Given 12/30/23 1558)   ketorolac (TORADOL) injection 15 mg (15 mg Intravenous Given 12/30/23 1558)   magnesium sulfate 4 g/100 mL IVPB (premix) 4 g (4 g Intravenous New Bag 12/30/23 1948)   magnesium sulfate 2 g/50 mL IVPB (premix) 2 g (2 g Intravenous New Bag 12/31/23 0904)   aluminum-magnesium hydroxide-simethicone (MAALOX) oral suspension 30 mL (30 mL Oral Given 12/31/23 1143)   metoclopramide (REGLAN) injection 10 mg  (10 mg Intravenous Given 12/31/23 1143)       Diagnostic Studies  Results Reviewed       Procedure Component Value Units Date/Time    Blood culture #1 [[id number]] Collected: 12/30/23 1418    Lab Status: Preliminary result Specimen: Blood from Arm, Left Updated: 12/31/23 1901     Blood Culture No Growth at 24 hrs.    Blood culture #2 [[id number]] Collected: 12/30/23 1411    Lab Status: Preliminary result Specimen: Blood from Arm, Right Updated: 12/31/23 1901     Blood Culture No Growth at 24 hrs.    Comprehensive metabolic panel [[id number]]  (Abnormal) Collected: 12/31/23 0512    Lab Status: Final result Specimen: Blood from Arm, Right Updated: 12/31/23 0607     Sodium 136 mmol/L      Potassium 3.9 mmol/L      Chloride 101 mmol/L      CO2 26 mmol/L      ANION GAP 9 mmol/L      BUN 16 mg/dL      Creatinine 0.95 mg/dL      Glucose 212 mg/dL      Calcium 8.2 mg/dL      AST 13 U/L      ALT 10 U/L      Alkaline Phosphatase 58 U/L      Total Protein 6.2 g/dL      Albumin 3.5 g/dL      Total Bilirubin 0.62 mg/dL      eGFR 89 ml/min/1.73sq m     Narrative:      National Kidney Disease Foundation guidelines for Chronic Kidney Disease (CKD):     Stage 1 with normal or high GFR (GFR > 90 mL/min/1.73 square meters)    Stage 2 Mild CKD (GFR = 60-89 mL/min/1.73 square meters)    Stage 3A Moderate CKD (GFR = 45-59 mL/min/1.73 square meters)    Stage 3B Moderate CKD (GFR = 30-44 mL/min/1.73 square meters)    Stage 4 Severe CKD (GFR = 15-29 mL/min/1.73 square meters)    Stage 5 End Stage CKD (GFR <15 mL/min/1.73 square meters)  Note: GFR calculation is accurate only with a steady state creatinine    Magnesium [[id number]]  (Abnormal) Collected: 12/31/23 0512    Lab Status: Final result Specimen: Blood from Arm, Right Updated: 12/31/23 0607     Magnesium 1.8 mg/dL     Phosphorus [[id number]]  (Normal) Collected: 12/31/23 0512    Lab Status: Final result Specimen: Blood from Arm, Right Updated: 12/31/23 0607     Phosphorus 3.0 mg/dL      CBC and differential [[id number]]  (Abnormal) Collected: 12/31/23 0512    Lab Status: Final result Specimen: Blood from Arm, Right Updated: 12/31/23 0555     WBC 11.18 Thousand/uL      RBC 4.04 Million/uL      Hemoglobin 13.0 g/dL      Hematocrit 38.2 %      MCV 95 fL      MCH 32.2 pg      MCHC 34.0 g/dL      RDW 12.4 %      MPV 10.5 fL      Platelets 175 Thousands/uL      nRBC 0 /100 WBCs      Neutrophils Relative 71 %      Immat GRANS % 1 %      Lymphocytes Relative 19 %      Monocytes Relative 9 %      Eosinophils Relative 0 %      Basophils Relative 0 %      Neutrophils Absolute 8.01 Thousands/µL      Immature Grans Absolute 0.06 Thousand/uL      Lymphocytes Absolute 2.07 Thousands/µL      Monocytes Absolute 0.99 Thousand/µL      Eosinophils Absolute 0.02 Thousand/µL      Basophils Absolute 0.03 Thousands/µL     Fingerstick Glucose (POCT) [[id number]]  (Abnormal) Collected: 12/30/23 1938    Lab Status: Final result Updated: 12/30/23 1940     POC Glucose 271 mg/dl     HS Troponin I 2hr [[id number]]  (Normal) Collected: 12/30/23 1600    Lab Status: Final result Specimen: Blood from Arm, Left Updated: 12/30/23 1631     hs TnI 2hr 9 ng/L      Delta 2hr hsTnI 0 ng/L     Beta Hydroxybutyrate [[id number]]  (Abnormal) Collected: 12/30/23 1600    Lab Status: Final result Specimen: Blood from Arm, Left Updated: 12/30/23 1626     BETA-HYDROXYBUTYRATE 1.3 mmol/L     Blood gas, venous [[id number]]  (Abnormal) Collected: 12/30/23 1600    Lab Status: Final result Specimen: Blood from Arm, Left Updated: 12/30/23 1619     pH, [place] 7.380     pCO2, [place] 48.6 mm Hg      pO2, [place] 31.1 mm Hg      HCO3, [place] 28.1 mmol/L      Base Excess, [place] 2.2 mmol/L      O2 Content, [place] 12.1 ml/dL      O2 HGB, VENOUS 59.6 %     Fingerstick Glucose (POCT) [[id number]]  (Abnormal) Collected: 12/30/23 1600    Lab Status: Final result Updated: 12/30/23 1602     POC Glucose 227 mg/dl     Urine Microscopic [[id number]]  (Abnormal) Collected: 12/30/23 1534    Lab  Status: Final result Specimen: Urine, Clean Catch Updated: 12/30/23 1600     RBC, UA 1-2 /hpf      WBC, UA None Seen /hpf      Epithelial Cells None Seen /hpf      Bacteria, UA None Seen /hpf      MUCUS THREADS Occasional    UA w Reflex to Microscopic w Reflex to Culture [[id number]]  (Abnormal) Collected: 12/30/23 1534    Lab Status: Final result Specimen: Urine, Clean Catch Updated: 12/30/23 1550     Color, UA Light Yellow     Clarity, UA Clear     Specific Gravity, UA 1.039     pH, UA 7.0     Leukocytes, UA Negative     Nitrite, UA Negative     Protein, UA Trace mg/dl      Glucose, UA 1000 (1%) mg/dl      Ketones,  (4+) mg/dl      Urobilinogen, UA <2.0 mg/dl      Bilirubin, UA Negative     Occult Blood, UA Negative    FLU/RSV/COVID - if FLU/RSV clinically relevant [[id number]]  (Normal) Collected: 12/30/23 1411    Lab Status: Final result Specimen: Nares from Nose Updated: 12/30/23 1519     SARS-CoV-2 Negative     INFLUENZA A PCR Negative     INFLUENZA B PCR Negative     RSV PCR Negative    Narrative:      FOR PEDIATRIC PATIENTS - copy/paste COVID Guidelines URL to browser: [email or web address]    SARS-CoV-2 assay is a Nucleic Acid Amplification assay intended for the  qualitative detection of nucleic acid from SARS-CoV-2 in nasopharyngeal  swabs. Results are for the presumptive identification of SARS-CoV-2 RNA.    Positive results are indicative of infection with SARS-CoV-2, the virus  causing COVID-19, but do not rule out bacterial infection or co-infection  with other viruses. Laboratories within the [place] and its  territories are required to report all positive results to the appropriate  public health authorities. Negative results do not preclude SARS-CoV-2  infection and should not be used as the sole basis for treatment or other  patient management decisions. Negative results must be combined with  clinical observations, patient history, and  epidemiological information.  This test has not been FDA cleared or approved.    This test has been authorized by FDA under an Emergency Use Authorization  (EUA). This test is only authorized for the duration of time the  declaration that circumstances exist justifying the authorization of the  emergency use of an in vitro diagnostic tests for detection of SARS-CoV-2  virus and/or diagnosis of COVID-19 infection under section 564(b)(1) of  the Act, 21 U.S.C. 360bbb-3(b)(1), unless the authorization is terminated  or revoked sooner. The test has been validated but independent review by FDA  and CLIA is pending.    Test performed using [facility] GeneXpert: This RT-PCR assay targets N2,  a region unique to SARS-CoV-2. A conserved region in the E-gene was chosen  for pan-Sarbecovirus detection which includes SARS-CoV-2.    According to CMS-2020-01-R, this platform meets the definition of high-throughput technology.    RBC Morphology Reflex Test [[id number]] Collected: 12/30/23 1411    Lab Status: Final result Specimen: Blood from Arm, Right Updated: 12/30/23 1501    CBC and differential [[id number]]  (Abnormal) Collected: 12/30/23 1411    Lab Status: Final result Specimen: Blood from Arm, Right Updated: 12/30/23 1457     WBC 10.51 Thousand/uL      RBC 4.59 Million/uL      Hemoglobin 14.5 g/dL      Hematocrit 43.0 %      MCV 94 fL      MCH 31.6 pg      MCHC 33.7 g/dL      RDW 12.4 %      MPV 10.7 fL      Platelets 203 Thousands/uL     Narrative:      This is an appended report.  These results have been appended to a previously verified report.    Manual Differential(PHLEBS Do Not Order) [[id number]]  (Abnormal) Collected: 12/30/23 1411    Lab Status: Final result Specimen: Blood from Arm, Right Updated: 12/30/23 1457     Segmented % 91 %      Bands % 2 %      Lymphocytes % 4 %      Monocytes % 3 %      Eosinophils, % 0 %      Basophils % 0 %      Absolute Neutrophils 9.77 Thousand/uL      Lymphocytes Absolute 0.42 Thousand/uL       Monocytes Absolute 0.32 Thousand/uL      Eosinophils Absolute 0.00 Thousand/uL      Basophils Absolute 0.00 Thousand/uL      Total Counted --     RBC Morphology Normal     Platelet Estimate Adequate     Large Platelet Present    HS Troponin 0hr (reflex protocol) [[id number]]  (Normal) Collected: 12/30/23 1411    Lab Status: Final result Specimen: Blood from Arm, Right Updated: 12/30/23 1449     hs TnI 0hr 9 ng/L     Comprehensive metabolic panel [[id number]]  (Abnormal) Collected: 12/30/23 1411    Lab Status: Final result Specimen: Blood from Arm, Right Updated: 12/30/23 1448     Sodium 135 mmol/L      Potassium 4.0 mmol/L      Chloride 98 mmol/L      CO2 27 mmol/L      ANION GAP 10 mmol/L      BUN 12 mg/dL      Creatinine 0.94 mg/dL      Glucose 259 mg/dL      Calcium 9.5 mg/dL      AST 20 U/L      ALT 14 U/L      Alkaline Phosphatase 82 U/L      Total Protein 8.1 g/dL      Albumin 4.5 g/dL      Total Bilirubin 0.67 mg/dL      eGFR 90 ml/min/1.73sq m     Narrative:      National Kidney Disease Foundation guidelines for Chronic Kidney Disease (CKD):     Stage 1 with normal or high GFR (GFR > 90 mL/min/1.73 square meters)    Stage 2 Mild CKD (GFR = 60-89 mL/min/1.73 square meters)    Stage 3A Moderate CKD (GFR = 45-59 mL/min/1.73 square meters)    Stage 3B Moderate CKD (GFR = 30-44 mL/min/1.73 square meters)    Stage 4 Severe CKD (GFR = 15-29 mL/min/1.73 square meters)    Stage 5 End Stage CKD (GFR <15 mL/min/1.73 square meters)  Note: GFR calculation is accurate only with a steady state creatinine    Lipase [[id number]]  (Abnormal) Collected: 12/30/23 1411    Lab Status: Final result Specimen: Blood from Arm, Right Updated: 12/30/23 1448     Lipase 6 u/L     Magnesium [[id number]]  (Abnormal) Collected: 12/30/23 1411    Lab Status: Final result Specimen: Blood from Arm, Right Updated: 12/30/23 1448     Magnesium 1.3 mg/dL     Lactic acid, plasma (w/reflex if result > 2.0) [[id number]]  (Normal) Collected: 12/30/23  1411    Lab Status: Final result Specimen: Blood from Arm, Right Updated: 12/30/23 1447     LACTIC ACID 1.4 mmol/L     Narrative:      Result may be elevated if tourniquet was used during collection.    Protime-INR [[id number]]  (Normal) Collected: 12/30/23 1411    Lab Status: Final result Specimen: Blood from Arm, Right Updated: 12/30/23 1445     Protime 14.4 seconds      INR 1.06    APTT [[id number]]  (Normal) Collected: 12/30/23 1411    Lab Status: Final result Specimen: Blood from Arm, Right Updated: 12/30/23 1445     PTT 25 seconds                    CTA dissection protocol chest abdomen pelvis w wo contrast   Final Result by Marvin Rock MD (12/30 1632)      1. No acute findings in the chest, abdomen, or pelvis. No aortic dissection or intramural hematoma.                  Workstation performed: [id number]         XR chest 1 view portable   ED Interpretation by Zach Knight MD (12/30 1442)   My personal interpretation-no acute cardiopulmonary disease appreciated.      Final Result by Edmundo Hamlin MD (12/31 1148)      No acute cardiopulmonary disease.                  Workstation performed: [id number]               Procedures  ECG 12 Lead Documentation Only    Date/Time: 12/30/2023 3:50 PM    Performed by: Zach Knight MD  Authorized by: Zach Knight MD    Patient location:  ED  Interpretation:     Interpretation: abnormal    Rate:     ECG rate:  49    ECG rate assessment: bradycardic    Rhythm:     Rhythm: sinus bradycardia    Ectopy:     Ectopy: none    QRS:     QRS axis:  Left    QRS intervals:  Normal  Conduction:     Conduction: normal    ST segments:     ST segments:  Normal  T waves:     T waves: normal          ED Course  ED Course as of 12/31/23 2220   Sat Dec 30, 2023   1436 CBC and differential(!)  WBCs 10.51, otherwise unremarkable.   1437 58-year-old male history of type 1 diabetes, CAD on ASA and Plavix, GERD, hypothyroid, diabetes presenting due to weakness, abdominal pain, uncontrollable  vomiting and appears pale on exam.  Patient states symptoms started this morning and prior to this was in his normal state of health.  Concern at this time for ACS, aortic dissection, sepsis, intra-abdominal process.   1442 XR chest 1 view portable  My personal interpretation-no acute cardiopulmonary disease appreciated.   1453 Magnesium(!): 1.3  Hypomag, will replete   1455 Lipase(!): 6  WNL   1455 Lactic acid, plasma (w/reflex if result > 2.0)  WNL   1455 hs TnI 0hr: 9  Will repeat 2 hr   1455 Comprehensive metabolic panel(!)  Hyperglycemia   1455 PROTIME: 14.4   1455 POCT INR: 1.06   1516 Manual Differential(PHLEBS Do Not Order)(!)  Neutrophil predominance   1517 On chart review, it appears that patient is typically bradycardic ranging from 50 to 65 bpm   1541 FLU/RSV/COVID - if FLU/RSV clinically relevant  Negative     1551 UA w Reflex to Microscopic w Reflex to Culture(!)  Concentrated urine with elevated glucose and ketones.   1600 BETA-HYDROXYBUTYRATE(!): 1.3  elevated   1622 POC Glucose(!): 227  Hyperglycemia   1622 Blood gas, venous(!)  Normal pH   1622 ABO Grouping: B   1623 Rh Factor: Positive   1623 Urine Microscopic(!)  Not suggestive of UTI   1807 Patient discussed with inpatient team and admitted due to persistent bradycardia and persistent nausea, vomiting and abdominal pain.    Unclear etiology to symptoms at this time but patient remains bradycardic with nausea and abdominal pain.             HEART Risk Score      Flowsheet Row Most Recent Value   Heart Score Risk Calculator    History 0 Filed at: 12/31/2023 2220   ECG 1 Filed at: 12/31/2023 2220   Age 1 Filed at: 12/31/2023 2220   Risk Factors 1 Filed at: 12/31/2023 2220   Troponin 0 Filed at: 12/31/2023 2220   HEART Score 3 Filed at: 12/31/2023 2220                        SBIRT 22yo+      Flowsheet Row Most Recent Value   Initial Alcohol Screen: US AUDIT-C     1. How often do you have a drink containing alcohol? 0 Filed at: 12/30/2023 1653   2.  How many drinks containing alcohol do you have on a typical day you are drinking?  0 Filed at: 12/30/2023 1653   3a. Male UNDER 65: How often do you have five or more drinks on one occasion? 0 Filed at: 12/30/2023 1653   3b. FEMALE Any Age, or MALE 65+: How often do you have 4 or more drinks on one occassion? 0 Filed at: 12/30/2023 1653   Audit-C Score 0 Filed at: 12/30/2023 1653   CRISPIN: How many times in the past year have you...    Used an illegal drug or used a prescription medication for non-medical reasons? Never Filed at: 12/30/2023 1653                  Medical Decision Making  Amount and/or Complexity of Data Reviewed  Labs: ordered. Decision-making details documented in ED Course.  Radiology: ordered and independent interpretation performed. Decision-making details documented in ED Course.    Risk  Prescription drug management.  Decision regarding hospitalization.          Disposition  Final diagnoses:   Abdominal pain   Hyperglycemia   Bradycardia     Time reflects when diagnosis was documented in both MDM as applicable and the Disposition within this note       Time User Action Codes Description Comment    12/30/2023  6:06 PM Zach Kinght [R10.9] Abdominal pain     12/30/2023  6:06 PM Zach Knight [R73.9] Hyperglycemia     12/30/2023  6:06 PM Zach Knight [R00.1] Bradycardia     12/30/2023  6:13 PM Yesy Early Add [E10.69] Type 1 diabetes mellitus with other specified complication (HCC)     12/31/2023  2:28 PM Yesy Early Add [K29.70] Gastritis     12/31/2023  3:27 PM Yesy Early Add [E03.9] Hypothyroidism     12/31/2023  3:48 PM Yesy Early Add [R11.2] Intractable nausea and vomiting           ED Disposition       ED Disposition   Admit    Condition   Stable    Date/Time   Sat Dec 30, 2023 1806    Comment                  Follow-up Information    None         Discharge Medication List as of 12/31/2023  4:28 PM        START taking these medications    Details    aluminum-magnesium hydroxide-simethicone (MAALOX MAX) 400-400-40 MG/5ML suspension Take 5 mL by mouth every 6 (six) hours as needed for indigestion or heartburn, Starting Sun 12/31/2023, Normal      famotidine (PEPCID) 20 mg tablet Take 1 tablet (20 mg total) by mouth 2 (two) times a day, Starting Sun 12/31/2023, Until Tue 1/30/2024, Normal           CONTINUE these medications which have CHANGED    Details   levothyroxine 112 mcg tablet Take 1 tablet (112 mcg total) by mouth daily in the early morning, Starting Mon 1/1/2024, No Print      pantoprazole (PROTONIX) 40 mg tablet Take 1 tablet (40 mg total) by mouth daily for 14 days, Starting Sun 12/31/2023, Until Sun 1/14/2024, Normal      metoclopramide (Reglan) 10 mg tablet Take 1 tablet (10 mg total) by mouth 4 (four) times a day for 7 days As needed for nausea, Starting Sun 12/31/2023, Until Sun 1/7/2024, Print           CONTINUE these medications which have NOT CHANGED    Details   aspirin 81 mg chewable tablet Chew 81 mg daily, Historical Med      busPIRone (BUSPAR) 15 mg tablet Take 15 mg by mouth 2 (two) times a day, Starting Wed 2/8/2023, Until Thu 2/8/2024, Historical Med      hydrOXYzine HCL (ATARAX) 25 mg tablet Take 25 mg by mouth every 6 (six) hours as needed, Starting Thu 11/2/2023, Historical Med      lisinopril (ZESTRIL) 2.5 mg tablet Take 2.5 mg by mouth daily, Starting Mon 8/21/2023, Until Tue 8/20/2024, Historical Med      oxyCODONE (ROXICODONE) 15 mg immediate release tablet Take 15 mg by mouth every 6 (six) hours as needed for moderate pain or severe pain (For knee pain), Starting Wed 12/20/2023, Historical Med      PATIENT MAINTAINED INSULIN PUMP Inject 1 each under the skin every 8 (eight) hours, Starting Thu 5/6/2021, No Print      umeclidinium bromide (Incruse Ellipta) 62.5 mcg/inh AEPB inhaler Inhale 1 Inhaler daily, Starting Mon 4/19/2021, Historical Med      clopidogrel (PLAVIX) 75 mg tablet Take 75 mg by mouth daily, Starting Wed  12/23/2020, Until Thu 12/23/2021, Historical Med           STOP taking these medications       dicyclomine (BENTYL) 20 mg tablet Comments:   Reason for Stopping:         folic acid (FOLVITE) 1 mg tablet Comments:   Reason for Stopping:         ondansetron (ZOFRAN) 4 mg tablet Comments:   Reason for Stopping:         sucralfate (CARAFATE) 1 g tablet Comments:   Reason for Stopping:             No discharge procedures on file.    PDMP Review       None             ED Provider  Attending physically available and evaluated Brian Butterfield. I managed the patient along with the ED Attending.    Electronically Signed by           Zach Knight MD  12/31/23 0388

## 2023-12-31 VITALS
HEART RATE: 59 BPM | HEIGHT: 65 IN | BODY MASS INDEX: 26.81 KG/M2 | SYSTOLIC BLOOD PRESSURE: 130 MMHG | RESPIRATION RATE: 18 BRPM | OXYGEN SATURATION: 97 % | WEIGHT: 160.94 LBS | TEMPERATURE: 98.9 F | DIASTOLIC BLOOD PRESSURE: 62 MMHG

## 2023-12-31 LAB
ALBUMIN SERPL BCP-MCNC: 3.5 G/DL (ref 3.5–5)
ALP SERPL-CCNC: 58 U/L (ref 34–104)
ALT SERPL W P-5'-P-CCNC: 10 U/L (ref 7–52)
ANION GAP SERPL CALCULATED.3IONS-SCNC: 9 MMOL/L
AST SERPL W P-5'-P-CCNC: 13 U/L (ref 13–39)
ATRIAL RATE: 49 BPM
BASOPHILS # BLD AUTO: 0.03 THOUSANDS/ÂΜL (ref 0–0.1)
BASOPHILS NFR BLD AUTO: 0 % (ref 0–1)
BILIRUB SERPL-MCNC: 0.62 MG/DL (ref 0.2–1)
BUN SERPL-MCNC: 16 MG/DL (ref 5–25)
CALCIUM SERPL-MCNC: 8.2 MG/DL (ref 8.4–10.2)
CHLORIDE SERPL-SCNC: 101 MMOL/L (ref 96–108)
CO2 SERPL-SCNC: 26 MMOL/L (ref 21–32)
CREAT SERPL-MCNC: 0.95 MG/DL (ref 0.6–1.3)
EOSINOPHIL # BLD AUTO: 0.02 THOUSAND/ÂΜL (ref 0–0.61)
EOSINOPHIL NFR BLD AUTO: 0 % (ref 0–6)
ERYTHROCYTE [DISTWIDTH] IN BLOOD BY AUTOMATED COUNT: 12.4 % (ref 11.6–15.1)
GFR SERPL CREATININE-BSD FRML MDRD: 89 ML/MIN/1.73SQ M
GLUCOSE SERPL-MCNC: 120 MG/DL (ref 65–140)
GLUCOSE SERPL-MCNC: 131 MG/DL (ref 65–140)
GLUCOSE SERPL-MCNC: 158 MG/DL (ref 65–140)
GLUCOSE SERPL-MCNC: 170 MG/DL (ref 65–140)
GLUCOSE SERPL-MCNC: 212 MG/DL (ref 65–140)
GLUCOSE SERPL-MCNC: 213 MG/DL (ref 65–140)
GLUCOSE SERPL-MCNC: 224 MG/DL (ref 65–140)
HCT VFR BLD AUTO: 38.2 % (ref 36.5–49.3)
HGB BLD-MCNC: 13 G/DL (ref 12–17)
IMM GRANULOCYTES # BLD AUTO: 0.06 THOUSAND/UL (ref 0–0.2)
IMM GRANULOCYTES NFR BLD AUTO: 1 % (ref 0–2)
LYMPHOCYTES # BLD AUTO: 2.07 THOUSANDS/ÂΜL (ref 0.6–4.47)
LYMPHOCYTES NFR BLD AUTO: 19 % (ref 14–44)
MAGNESIUM SERPL-MCNC: 1.8 MG/DL (ref 1.9–2.7)
MCH RBC QN AUTO: 32.2 PG (ref 26.8–34.3)
MCHC RBC AUTO-ENTMCNC: 34 G/DL (ref 31.4–37.4)
MCV RBC AUTO: 95 FL (ref 82–98)
MONOCYTES # BLD AUTO: 0.99 THOUSAND/ÂΜL (ref 0.17–1.22)
MONOCYTES NFR BLD AUTO: 9 % (ref 4–12)
NEUTROPHILS # BLD AUTO: 8.01 THOUSANDS/ÂΜL (ref 1.85–7.62)
NEUTS SEG NFR BLD AUTO: 71 % (ref 43–75)
NRBC BLD AUTO-RTO: 0 /100 WBCS
P AXIS: -6 DEGREES
PHOSPHATE SERPL-MCNC: 3 MG/DL (ref 2.7–4.5)
PLATELET # BLD AUTO: 175 THOUSANDS/UL (ref 149–390)
PMV BLD AUTO: 10.5 FL (ref 8.9–12.7)
POTASSIUM SERPL-SCNC: 3.9 MMOL/L (ref 3.5–5.3)
PR INTERVAL: 128 MS
PROT SERPL-MCNC: 6.2 G/DL (ref 6.4–8.4)
QRS AXIS: -36 DEGREES
QRSD INTERVAL: 102 MS
QT INTERVAL: 448 MS
QTC INTERVAL: 404 MS
RBC # BLD AUTO: 4.04 MILLION/UL (ref 3.88–5.62)
SODIUM SERPL-SCNC: 136 MMOL/L (ref 135–147)
T WAVE AXIS: 23 DEGREES
VENTRICULAR RATE: 49 BPM
WBC # BLD AUTO: 11.18 THOUSAND/UL (ref 4.31–10.16)

## 2023-12-31 PROCEDURE — 84100 ASSAY OF PHOSPHORUS: CPT

## 2023-12-31 PROCEDURE — 82948 REAGENT STRIP/BLOOD GLUCOSE: CPT

## 2023-12-31 PROCEDURE — 99238 HOSP IP/OBS DSCHRG MGMT 30/<: CPT | Performed by: INTERNAL MEDICINE

## 2023-12-31 PROCEDURE — 80053 COMPREHEN METABOLIC PANEL: CPT

## 2023-12-31 PROCEDURE — 99232 SBSQ HOSP IP/OBS MODERATE 35: CPT | Performed by: STUDENT IN AN ORGANIZED HEALTH CARE EDUCATION/TRAINING PROGRAM

## 2023-12-31 PROCEDURE — 83735 ASSAY OF MAGNESIUM: CPT

## 2023-12-31 PROCEDURE — 85025 COMPLETE CBC W/AUTO DIFF WBC: CPT

## 2023-12-31 RX ORDER — FAMOTIDINE 20 MG/1
20 TABLET, FILM COATED ORAL 2 TIMES DAILY
Status: DISCONTINUED | OUTPATIENT
Start: 2023-12-31 | End: 2023-12-31 | Stop reason: HOSPADM

## 2023-12-31 RX ORDER — LEVOTHYROXINE SODIUM 112 UG/1
112 TABLET ORAL
Start: 2024-01-01

## 2023-12-31 RX ORDER — METOCLOPRAMIDE 10 MG/1
10 TABLET ORAL 4 TIMES DAILY
Qty: 28 TABLET | Refills: 0 | Status: SHIPPED | OUTPATIENT
Start: 2023-12-31 | End: 2023-12-31

## 2023-12-31 RX ORDER — MAGNESIUM HYDROXIDE/ALUMINUM HYDROXICE/SIMETHICONE 120; 1200; 1200 MG/30ML; MG/30ML; MG/30ML
30 SUSPENSION ORAL ONCE
Status: COMPLETED | OUTPATIENT
Start: 2023-12-31 | End: 2023-12-31

## 2023-12-31 RX ORDER — MAGNESIUM SULFATE HEPTAHYDRATE 40 MG/ML
2 INJECTION, SOLUTION INTRAVENOUS ONCE
Status: COMPLETED | OUTPATIENT
Start: 2023-12-31 | End: 2023-12-31

## 2023-12-31 RX ORDER — METOCLOPRAMIDE 10 MG/1
10 TABLET ORAL 4 TIMES DAILY
Qty: 28 TABLET | Refills: 0 | Status: SHIPPED | OUTPATIENT
Start: 2023-12-31 | End: 2024-01-07

## 2023-12-31 RX ORDER — METOCLOPRAMIDE HYDROCHLORIDE 5 MG/ML
10 INJECTION INTRAMUSCULAR; INTRAVENOUS ONCE
Status: COMPLETED | OUTPATIENT
Start: 2023-12-31 | End: 2023-12-31

## 2023-12-31 RX ORDER — ALUMINA, MAGNESIA, AND SIMETHICONE 2400; 2400; 240 MG/30ML; MG/30ML; MG/30ML
5 SUSPENSION ORAL EVERY 6 HOURS PRN
Qty: 355 ML | Refills: 0 | Status: SHIPPED | OUTPATIENT
Start: 2023-12-31

## 2023-12-31 RX ORDER — FAMOTIDINE 20 MG/1
20 TABLET, FILM COATED ORAL 2 TIMES DAILY
Qty: 60 TABLET | Refills: 0 | Status: SHIPPED | OUTPATIENT
Start: 2023-12-31 | End: 2024-01-30

## 2023-12-31 RX ORDER — PANTOPRAZOLE SODIUM 40 MG/1
40 TABLET, DELAYED RELEASE ORAL DAILY
Qty: 14 TABLET | Refills: 0 | Status: SHIPPED | OUTPATIENT
Start: 2023-12-31 | End: 2024-01-14

## 2023-12-31 RX ADMIN — ENOXAPARIN SODIUM 40 MG: 40 INJECTION SUBCUTANEOUS at 09:04

## 2023-12-31 RX ADMIN — ALUMINUM HYDROXIDE, MAGNESIUM HYDROXIDE, AND DIMETHICONE 30 ML: 200; 20; 200 SUSPENSION ORAL at 11:43

## 2023-12-31 RX ADMIN — METOCLOPRAMIDE 10 MG: 5 INJECTION, SOLUTION INTRAMUSCULAR; INTRAVENOUS at 11:43

## 2023-12-31 RX ADMIN — UMECLIDINIUM 1 PUFF: 62.5 AEROSOL, POWDER ORAL at 09:05

## 2023-12-31 RX ADMIN — PANTOPRAZOLE SODIUM 40 MG: 40 TABLET, DELAYED RELEASE ORAL at 05:42

## 2023-12-31 RX ADMIN — BUSPIRONE HYDROCHLORIDE 15 MG: 5 TABLET ORAL at 09:04

## 2023-12-31 RX ADMIN — METOCLOPRAMIDE 10 MG: 5 INJECTION, SOLUTION INTRAMUSCULAR; INTRAVENOUS at 01:43

## 2023-12-31 RX ADMIN — MAGNESIUM SULFATE HEPTAHYDRATE 2 G: 40 INJECTION, SOLUTION INTRAVENOUS at 09:04

## 2023-12-31 RX ADMIN — LEVOTHYROXINE SODIUM 112 MCG: 112 TABLET ORAL at 05:42

## 2023-12-31 RX ADMIN — OXYCODONE HYDROCHLORIDE 15 MG: 10 TABLET ORAL at 09:11

## 2023-12-31 NOTE — PLAN OF CARE
Problem: Potential for Falls  Goal: Patient will remain free of falls  Description: INTERVENTIONS:  - Educate patient/family on patient safety including physical limitations  - Instruct patient to call for assistance with activity   - Consult OT/PT to assist with strengthening/mobility   - Keep Call bell within reach  - Keep bed low and locked with side rails adjusted as appropriate  - Keep care items and personal belongings within reach  - Initiate and maintain comfort rounds  - Make Fall Risk Sign visible to staff  - Offer Toileting every 2 Hours, in advance of need  - Initiate/Maintain alarm  - Obtain necessary fall risk management equipment:   - Apply yellow socks and bracelet for high fall risk patients  - Consider moving patient to room near nurses station  12/31/2023 1646 by Dilma Gillespie RN  Outcome: Adequate for Discharge  12/31/2023 1524 by Dilma Gillespie RN  Outcome: Progressing

## 2023-12-31 NOTE — ASSESSMENT & PLAN NOTE
POA: Epigastric pain with associated intractable nausea and vomiting.   Likely in the setting of acute gastritis vs gastroparesis complicated by opioid use vs cannabis hyperemesis syndrome       Plan:  Reglan 10mg q6h PRN ordered at discharge  Rest of plan as above.

## 2023-12-31 NOTE — ASSESSMENT & PLAN NOTE
History of significant CAD, s/p ABDIAS x 4 in 2020 and 2021.   PTA on ASA and Plavix, however was told to hold this until his carpal tunnel repair surgery on 1/5/23    Plan:  At this time will hold ASA and Plavix

## 2023-12-31 NOTE — ASSESSMENT & PLAN NOTE
He has a significant left knee pain and deformity related to severe fracture and postoperative infection with MRSA. His fracture has gone on to heal and his infection is resolved but he is left with posttraumatic arthritis as well as hardware irritation.  Chronically on Oxycodone 15mg q6h PRN ordered [facility] Dr[facility]    Plan;  Continue PTA oxycodone at discharge  Follow-up with orthopedic surgery

## 2023-12-31 NOTE — ASSESSMENT & PLAN NOTE
POA HR 49, ecg demonstrating sinus bradycardia, incomplete RBBB and left axis deviation. Patient denies dizziness or lightheadedness, LoC.   On chart review this appears to be chronic with heart rates in the 40s and 50s.   He has extensive cardiac history including CAD with PCA in 2020 and 2021.   Follows up with [facility] cardiology.   Seems to have previously been on metoprolol but does not seem to be taking it at this time.   Troponin WNL  Were no events on telemetry, heart rates ranging from high 40s to 60s.  Patient denies dizziness or lightheadedness, no palpitations.    Plan:  Advised patient to follow-up with his cardiologist on outpatient basis

## 2023-12-31 NOTE — ASSESSMENT & PLAN NOTE
Lab Results   Component Value Date    HGBA1C 9.0 (H) 10/02/2023       Recent Labs     12/31/23  0322 12/31/23  0529 12/31/23  0752 12/31/23  1045   POCGLU 120 213* 158* 170*     Suboptimally controlled with A1c of 9%, he is on tandem insulin pump with basal IQ feature, following with [facility] and endocrinology,  Currently on continuous insulin infusion, his symptoms has improved, insulin pump can be resumed with below setting.  Basal rate:  12 AM: 1.15 units per hour  6 AM: 1.2 units/h  6 PM: 1.2 units/h  10 PM: 1.40 units/h    Insulin to carb ratio:  12 AM: 1: 7  6 AM: 1: 8  6 PM: 1: 9  10 PM: 1: 7  Insulin sensitivity factor:  12 AM: 1: 40  6 AM: 1: 30  6 PM: 1: 35  10 PM: 1: 40  BG target: 120  Type of insulin: Humalog  Active Insulin Time: 4 hrs  Discontinue insulin infusion 1 hour after initiation of pump.  Outpatient endocrinology follow-up.

## 2023-12-31 NOTE — ASSESSMENT & PLAN NOTE
History of significant CAD, s/p ABDIAS x 4 in 2020 and 2021.   PTA on ASA and Plavix, however was told to hold this until his carpal tunnel repair surgery on 1/5/23    Plan:  With patient, told to hold aspirin and Plavix until his upcoming procedure.

## 2023-12-31 NOTE — CONSULTS
Consultation - Brian Butterfield 58 y.o. 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  Basal rate:  12 AM: 1.15 units per hour  6 AM: 1.2 units/h  6 PM: 1.2 units/h  10 PM: 1.40 units/h    Insulin to carb ratio:  12 AM: 1: 7  6 AM: 1: 8  6 PM: 1: 9  10 PM: 1: 7  Insulin sensitivity factor:  12 AM: 1: 40  6 AM: 1: 30  6 PM: 1: 35  10 PM: 1: 40  BG target: 120  Type of insulin: Humalog  Active Insulin Time: 4 hrs  Discontinue insulin infusion 1 hour after initiation of pump.  Outpatient endocrinology follow-up.      Intractable nausea and vomiting  Assessment & Plan  His symptoms have improved, he tolerated diet.          CC: Diabetes Consult    History of Present Illness     HPI: Brian Butterfield is a 58 y.o. year old male with type 1 diabetes since age 21, who presents with epigastric pain and nausea and vomiting.  Endocrinology is consulted for diabetes management.  Diabetes is complicated by gastroparesis and retinopathy, [facility]    Patient is on a tandem pump prescribed by Endocrinology.  He has been on this pump for 4 years.   He denies any malfunctioning of the pump.      Current Insulin pump settings:  Basal rate:  12 AM: 1.15 units per hour  6 AM: 1.2 units/h  6 PM: 1.2 units/h  10 PM: 1.40 units/h    Insulin to carb ratio:  12 AM: 1: 7  6 AM: 1: 8  6 PM: 1: 9  10 PM: 1: 7  Insulin  sensitivity factor:  12 AM: 1: 40  6 AM: 1: 30  6 PM: 1: 35  10 PM: 1: 40  BG target: 120  Type of insulin: Humalog  Active Insulin Time: 4 hrs    Currently on insulin infusion, symptoms have improved, he has tolerated breakfast and lunch.      Inpatient consult to Endocrinology  Consult performed by: Caitlin Colby MD  Consult ordered by: Eric Early MD          Review of Systems   Constitutional:  Negative for appetite change, fatigue and unexpected weight change.   HENT:  Negative for trouble swallowing and voice change.    Eyes:  Negative for visual disturbance.   Respiratory:  Negative for cough, shortness of breath and wheezing.    Cardiovascular:  Negative for palpitations and leg swelling.   Gastrointestinal:  Positive for abdominal pain, nausea and vomiting. Negative for constipation and diarrhea.   Endocrine: Negative for cold intolerance, heat intolerance, polyphagia and polyuria.   Musculoskeletal:  Negative for arthralgias.   Skin:  Negative for color change, rash and wound.   Neurological:  Negative for dizziness, tremors, weakness, light-headedness, numbness and headaches.   Psychiatric/Behavioral:  Negative for agitation and sleep disturbance. The patient is not nervous/anxious.        Historical Information   Past Medical History:   Diagnosis Date    Coronary artery disease     3 stents     Diabetes mellitus (HCC)      Past Surgical History:   Procedure Laterality Date    KNEE SURGERY       Social History   Social History     Substance and Sexual Activity   Alcohol Use Not Currently     Social History     Substance and Sexual Activity   Drug Use Yes    Types: Marijuana    Comment: last use 2 days ago     Social History     Tobacco Use   Smoking Status Never   Smokeless Tobacco Never     Family History: History reviewed. No pertinent family history.    Meds/Allergies   Current Facility-Administered Medications   Medication Dose Route Frequency Provider Last Rate Last Admin    acetaminophen  "(TYLENOL) tablet 650 mg  650 mg Oral Q6H PRN Eric Early MD        busPIRone (BUSPAR) tablet 15 mg  15 mg Oral BID Eric Early MD   15 mg at 12/31/23 0904    enoxaparin (LOVENOX) subcutaneous injection 40 mg  40 mg Subcutaneous Daily Eric Early MD   40 mg at 12/31/23 0904    famotidine (PEPCID) tablet 20 mg  20 mg Oral BID Luis Soctt DO        hydrOXYzine HCL (ATARAX) tablet 25 mg  25 mg Oral Q6H PRN Eric Early MD        insulin regular (HumuLIN R,NovoLIN R) 1 Units/mL in sodium chloride 0.9 % 100 mL infusion  0.3-21 Units/hr Intravenous Titrated Eric Early MD 1 mL/hr at 12/31/23 1106 1 Units/hr at 12/31/23 1106    levothyroxine tablet 112 mcg  112 mcg Oral Early Morning Eric Early MD   112 mcg at 12/31/23 0542    lisinopril (ZESTRIL) tablet 2.5 mg  2.5 mg Oral Daily Eric Early MD        metoclopramide (REGLAN) injection 10 mg  10 mg Intravenous Q6H PRN Eric Early MD   10 mg at 12/31/23 0143    oxyCODONE (ROXICODONE) IR tablet 15 mg  15 mg Oral Q4H PRN Sharona Reinoso DO   15 mg at 12/31/23 0911    pantoprazole (PROTONIX) EC tablet 40 mg  40 mg Oral Early Morning Eric Early MD   40 mg at 12/31/23 0542    umeclidinium 62.5 mcg/actuation inhaler AEPB 1 puff  1 puff Inhalation Daily Eric Early MD   1 puff at 12/31/23 0905     Allergies   Allergen Reactions    Shellfish-Derived Products - Food Allergy Hives       Objective   Vitals: Blood pressure 106/59, pulse 63, temperature 98.9 °F (37.2 °C), temperature source Oral, resp. rate 17, height 5' 5\" (1.651 m), weight 73 kg (160 lb 15 oz), SpO2 94%.    Intake/Output Summary (Last 24 hours) at 12/31/2023 1229  Last data filed at 12/30/2023 1751  Gross per 24 hour   Intake 1050 ml   Output --   Net 1050 ml     Invasive Devices       Peripheral Intravenous Line  Duration             Peripheral IV 12/30/23 Left Antecubital <1 day    Peripheral IV 12/30/23 Left Forearm <1 day              "       Physical Exam  Vitals reviewed.   Constitutional:       General: He is not in acute distress.     Appearance: Normal appearance. He is normal weight. He is not ill-appearing, toxic-appearing or diaphoretic.   HENT:      Head: Normocephalic and atraumatic.      Nose: No congestion.   Eyes:      General:         Right eye: No discharge.         Left eye: No discharge.      Extraocular Movements: Extraocular movements intact.      Pupils: Pupils are equal, round, and reactive to light.   Neck:      Vascular: No carotid bruit.   Cardiovascular:      Rate and Rhythm: Normal rate and regular rhythm.      Pulses: Normal pulses.      Heart sounds: No murmur heard.  Pulmonary:      Effort: No respiratory distress.      Breath sounds: No wheezing, rhonchi or rales.   Abdominal:      General: Abdomen is flat. There is no distension.      Palpations: Abdomen is soft. There is no mass.      Tenderness: There is no abdominal tenderness.   Musculoskeletal:         General: No swelling or tenderness.      Cervical back: Normal range of motion and neck supple. No rigidity. No muscular tenderness.      Right lower leg: No edema.      Left lower leg: No edema.   Lymphadenopathy:      Cervical: No cervical adenopathy.   Skin:     General: Skin is warm and dry.      Capillary Refill: Capillary refill takes less than 2 seconds.   Neurological:      General: No focal deficit present.      Mental Status: He is alert and oriented to person, place, and time.         The history was obtained from the review of the chart, patient.    Lab Results:       Lab Results   Component Value Date    WBC 11.18 (H) 12/31/2023    HGB 13.0 12/31/2023    HCT 38.2 12/31/2023    MCV 95 12/31/2023     12/31/2023     Lab Results   Component Value Date/Time    BUN 16 12/31/2023 05:12 AM    BUN 12 02/21/2015 10:04 PM     02/21/2015 10:04 PM    K 3.9 12/31/2023 05:12 AM    K 3.8 02/21/2015 10:04 PM     12/31/2023 05:12 AM      "02/21/2015 10:04 PM    CO2 26 12/31/2023 05:12 AM    CO2 32 (H) 02/21/2015 10:04 PM    CREATININE 0.95 12/31/2023 05:12 AM    CREATININE 1.03 02/21/2015 10:04 PM    AST 13 12/31/2023 05:12 AM    AST 28 02/21/2015 10:04 PM    ALT 10 12/31/2023 05:12 AM    ALT 51 02/21/2015 10:04 PM    TP 6.2 (L) 12/31/2023 05:12 AM    ALB 3.5 12/31/2023 05:12 AM    ALB 3.8 02/21/2015 10:04 PM     No results for input(s): \"CHOL\", \"HDL\", \"LDL\", \"TRIG\", \"VLDL\" in the last 72 hours.  No results found for: \"MICROALBUR\", \"GBNO80GQX\"  POC Glucose (mg/dl)   Date Value   12/31/2023 170 (H)   12/31/2023 158 (H)   12/31/2023 213 (H)   12/31/2023 120   12/31/2023 131   12/30/2023 185 (H)   12/30/2023 200 (H)   12/30/2023 271 (H)   12/30/2023 227 (H)   01/18/2022 278 (H)       Imaging Studies: I have personally reviewed pertinent reports.      Portions of the record may have been created with voice recognition software.    "

## 2023-12-31 NOTE — ASSESSMENT & PLAN NOTE
POA: 10/10 epigastric pain with associated burning chest pain and acid reflux.   Patient reports long history of acid reflux for which he takes TUMS at home. Was previously taking Protonix 40mg BID per chart review.   Also has gastroparesis in the setting of T1DM.   Reports improvement in symptoms after antiemetics and dilaudid.   Suspect most likely indigestion vs acute gastritis vs gastroparesis exacerbated by opioid use vs cannabis hyperemesis syndrome  Reports today, epigastric tenderness only with palpation, rated as 5/10.  No further episodes of nausea or vomiting.      Plan:  Continue Protonix 40 mg daily at discharge  Continue famotidine 20 mg twice daily discharge  Prescribed Mylanta 15mL q6h PRN for indigestion and heartburn  Prescribed Reglan 10mg q6h as needed for nausea.   Discussed with patient if no improvement in 4 weeks to discuss referral to GI with PCP. Counseled patient not to eat too closely to bedtime as this may exacerbate his reflux.   Rest of plan as below

## 2023-12-31 NOTE — DISCHARGE INSTR - AVS FIRST PAGE
Dear Brian Butterfield,     It was our pleasure to care for you here at [facility].  It is our hope that we were always able to exceed the expected standards for your care during your stay.  You were hospitalized due to epigastric pain, nausea and vomiting.  You were cared for on the South 3rd floor by Eric Early MD under the service of Rebeka Horta MD with the [facility] who covers for your primary care physician (PCP), No primary care provider on file., while you were hospitalized.  If you have any questions or concerns related to this hospitalization, you may contact us at .  For follow up as well as any medication refills, we recommend that you follow up with your primary care physician.  A registered nurse will reach out to you by phone within a few days after your discharge to answer any additional questions that you may have after going home.  However, at this time we provide for you here, the most important instructions / recommendations at discharge:     Notable Medication Adjustments -   Please start taking Pantroprazole (Protonix) 40mg, 1 tablet daily.  Please start taking Famotidine (Pepcid) 20mg, 1 tablet twice daily.   Please take MAALOX-Max, 5mL every 6 hours as needed for heartburn or indigestion.   Please take metoclopramide (Reglan) 10mg every 6 hours as needed for nausea.   Please continue taking the rest of your medications as prescribed.  Testing Required after Discharge -   None  ** Please contact your PCP to request testing orders for any of the testing recommended here **  Important follow up information -   Please follow up with your PCP within a week of discharge. If symptoms persist, inquire about referral to gastroenterology with your primary care provider.   Other Instructions -  Please contact your primary care provider, call 911 or go to the nearest emergency department for worsening  symptoms  Practice Social distancing  Please practie adequate hand washing techinque  Wear a mask in public at all times  Please review this entire after visit summary as additional general instructions including medication list, appointments, activity, diet, any pertinent wound care, and other additional recommendations from your care team that may be provided for you.      Sincerely,     Eric Early MD

## 2023-12-31 NOTE — ASSESSMENT & PLAN NOTE
POA: 10/10 epigastric pain with associated burning chest pain and acid reflux.   Patient reports long history of acid reflux for which he takes TUMS at home. Was previously taking Protonix 40mg BID per chart review.   Also has gastroparesis in the setting of T1DM.   Reports improvement in symptoms after antiemetics and dilaudid.   Suspect acute gastritis vs gastroparesis exacerbated by opioid use vs cannabis hyperemesis syndrome      Plan:  Will order Mylanta for patient.  Famotidine 20mg qhs ordered  Protonix 40mg daily ordered - will likely discharge patient on a trial of PPI.   Started patient on IV fluids - PlasmaLyte 100mL/h x 10 hours.   Rest of plan as below for nausea and vomiting.

## 2023-12-31 NOTE — ASSESSMENT & PLAN NOTE
He has a significant left knee pain and deformity related to severe fracture and postoperative infection with MRSA. His fracture has gone on to heal and his infection is resolved but he is left with posttraumatic arthritis as well as hardware irritation.  Chronically on Oxycodone 15mg q6h PRN [facility]    Plan;  Discussed with patient that this may contribute to his abdominal pain considering he also has history of gastroparesis.   At this time will continue PTA Oxycodone 15mg q6h PRN

## 2023-12-31 NOTE — ASSESSMENT & PLAN NOTE
No statin on file, patient has Alicorumab ordered by his cardiologist and he is waiting for it in the mail.

## 2023-12-31 NOTE — PLAN OF CARE
Problem: Potential for Falls  Goal: Patient will remain free of falls  Description: INTERVENTIONS:  - Educate patient/family on patient safety including physical limitations  - Instruct patient to call for assistance with activity   - Consult OT/PT to assist with strengthening/mobility   - Keep Call bell within reach  - Keep bed low and locked with side rails adjusted as appropriate  - Keep care items and personal belongings within reach  - Initiate and maintain comfort rounds  - Make Fall Risk Sign visible to staff  - Offer Toileting every 2 Hours, in advance of need  - Initiate/Maintain 2alarm  - Obtain necessary fall risk management equipment: bed alarm  - Apply yellow socks and bracelet for high fall risk patients  - Consider moving patient to room near nurses station  Outcome: Progressing

## 2023-12-31 NOTE — ASSESSMENT & PLAN NOTE
POA: Epigastric pain with associated intractable nausea and vomiting.   Likely in the setting of acute gastritis vs gastroparesis complicated by opioid use vs cannabis hyperemesis syndrome       Plan:  Reglan 10mg q6h PRN ordered  Will order Mylanta for patient  Famotidine 20mg qhs ordered  Protonix 40mg daily ordered - anticipate discharging patient on a trial of PPI  Will start IV hydration with PlasmaLyte 100mL/h x 10 hours.  Rest of plan as above.

## 2023-12-31 NOTE — DISCHARGE SUMMARY
[facility]  Discharge- Brian Butterfield 1965, 58 y.o. male MRN: [id number]  Unit/Bed#: S -01 Encounter: [id number]  Primary Care Provider: No primary care provider on file.   Date and time admitted to hospital: 12/30/2023  1:47 PM    * Epigastric pain  Assessment & Plan  POA: 10/10 epigastric pain with associated burning chest pain and acid reflux.   Patient reports long history of acid reflux for which he takes TUMS at home. Was previously taking Protonix 40mg BID per chart review.   Also has gastroparesis in the setting of T1DM.   Reports improvement in symptoms after antiemetics and dilaudid.   Suspect most likely indigestion vs acute gastritis vs gastroparesis exacerbated by opioid use vs cannabis hyperemesis syndrome  Reports today, epigastric tenderness only with palpation, rated as 5/10.  No further episodes of nausea or vomiting.      Plan:  Continue Protonix 40 mg daily at discharge  Continue famotidine 20 mg twice daily discharge  Prescribed Mylanta 15mL q6h PRN for indigestion and heartburn  Prescribed Reglan 10mg q6h as needed for nausea.   Discussed with patient if no improvement in 4 weeks to discuss referral to GI with PCP. Counseled patient not to eat too closely to bedtime as this may exacerbate his reflux.   Rest of plan as below     Bradycardia  Assessment & Plan  POA HR 49, ecg demonstrating sinus bradycardia, incomplete RBBB and left axis deviation. Patient denies dizziness or lightheadedness, LoC.   On chart review this appears to be chronic with heart rates in the 40s and 50s.   He has extensive cardiac history including CAD with PCA in 2020 and 2021.   [facility]   Seems to have previously been on metoprolol but does not seem to be taking it at this time.   Troponin WNL  Were no events on telemetry, heart rates ranging from high 40s to 60s.  Patient denies dizziness or lightheadedness, no palpitations.    Plan:  Advised patient to  follow-up with his cardiologist on outpatient basis    Intractable nausea and vomiting  Assessment & Plan  POA: Epigastric pain with associated intractable nausea and vomiting.   Likely in the setting of acute gastritis vs gastroparesis complicated by opioid use vs cannabis hyperemesis syndrome       Plan:  Reglan 10mg q6h PRN ordered at discharge  Rest of plan as above.     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   Possibly on Lantus 30 units nightly  Sugars  with betaOHbutyrate 1.3. No AGMA.     Plan:  Endocrinology consulted - recommendations appreciated  At this time we will start patient on insulin gtt  Q2h accuchecks while on gtt  Hypoglycemia protocol    Chronic pain of left knee  Assessment & Plan   He has a significant left knee pain and deformity related to severe fracture and postoperative infection with MRSA. His fracture has gone on to heal and his infection is resolved but he is left with posttraumatic arthritis as well as hardware irritation.  Chronically on Oxycodone 15mg q6h PRN ordered by Dr. Villasenor - [facility] orthopedics    Plan;  Continue PTA oxycodone at discharge  Follow-up with orthopedic surgery    HTN (hypertension)  Assessment & Plan  Continue PTA Lisinopril 2.5mg daily.       Hyperlipidemia  Assessment & Plan  No statin on file, patient has Alicorumab ordered by his cardiologist and he is waiting for it in the mail.     CAD S/P percutaneous coronary angioplasty  Assessment & Plan  History of significant CAD, s/p ABDIAS x 4 in 2020 and 2021.   PTA on ASA and Plavix, however was told to hold this until his carpal tunnel repair surgery on 1/5/23    Plan:  With  patient, told to hold aspirin and Plavix until his upcoming procedure.    Hypothyroidism  Assessment & Plan  Continue PTA Levothyroxine 112 mcg daily      Medical Problems       Resolved Problems  Date Reviewed: 5/4/2021   None       Discharging Resident: Eric Early MD  Discharging Attending: No att. providers found  PCP: No primary care provider on file.  Admission Date:   Admission Orders (From admission, onward)       Ordered        12/30/23 1807  Place in Observation  Once                          Discharge Date: 12/31/23    Consultations During Hospital Stay:  None    Procedures Performed:   None    Significant Findings / Test Results:   XR chest 1 view portable    Result Date: 12/31/2023  Impression: No acute cardiopulmonary disease. Workstation performed: [id number]     CTA dissection protocol chest abdomen pelvis w wo contrast    Result Date: 12/30/2023  Impression: 1. No acute findings in the chest, abdomen, or pelvis. No aortic dissection or intramural hematoma. Workstation performed: [id number]       XR chest 1 view portable    Result Date: 12/31/2023  Impression No acute cardiopulmonary disease. Workstation performed: [id number]          Incidental Findings:   None       Test Results Pending at Discharge (will require follow up):  None     Outpatient Tests Requested:  None    Complications:  None    Reason for Admission: Epigastric pain, intractable nausea and vomiting    Hospital Course:   Brian Butterfield is a 58 y.o. male patient who originally presented to the hospital on 12/30/2023 due to epigastric pain and intractable nausea and vomiting that started earlier that morning. In the ED vital signs were unremarkable, with labs demonstrating mild leukocytosis. Patient was noted to be bradycardic on ECG with HR in the 40s. Due to concern for aortic dissection, a CT chest/abdomen/pelvis was performed which revealed no abnormalities. Patient was admitted to the [facility] team and monitored on  "telemetry. He was started on Famotidine BID, Protonix and Mylanta with improvement in symptoms the following morning. Per chart review, bradycardia seems to be chronic and telemetry did not reveal any arrhythmias.   Given clinical improvement on the above regimen, patient was discharged on 12/31. He was advised to follow up with his PCP within a week of discharge, as well as discuss referral to GI if he does not have improvement in his reflux in 4 weeks.   Please see above list of diagnoses and related plan for additional information.     Condition at Discharge: good    Discharge Day Visit / Exam:   Subjective:  Patient found sitting in bed, reports improvement in abdominal tenderness, however he still feels some mild discomfort with associated burning. No further episodes of nausea and emesis overnight. He reports a long history of esophageal reflux for which he was told that there is nothing further to be done. At this time he notes that his knee pain is more bothersome than his abdominal pain.  Denies dizziness, lightheadedness, palpitations, chest pain, SoB, changes in urinary or bowel habits.  Vitals: Blood Pressure: 130/62 (12/31/23 1544)  Pulse: 59 (12/31/23 1544)  Temperature: 98.9 °F (37.2 °C) (12/31/23 0700)  Temp Source: Oral (12/31/23 0700)  Respirations: 18 (12/31/23 1544)  Height: 5' 5\" (165.1 cm) (12/30/23 2025)  Weight - Scale: 73 kg (160 lb 15 oz) (12/31/23 0531)  SpO2: 97 % (12/31/23 1544)  Exam:   Physical Exam  Vitals and nursing note reviewed.   Constitutional:       General: He is not in acute distress.     Appearance: Normal appearance. He is not ill-appearing, toxic-appearing or diaphoretic.   HENT:      Head: Normocephalic and atraumatic.      Nose: Nose normal.      Mouth/Throat:      Mouth: Mucous membranes are moist.      Pharynx: Oropharynx is clear.   Eyes:      General: No scleral icterus.        Right eye: No discharge.         Left eye: No discharge.      Extraocular Movements: " Extraocular movements intact.      Conjunctiva/sclera: Conjunctivae normal.   Cardiovascular:      Rate and Rhythm: Regular rhythm. Bradycardia present.      Pulses: Normal pulses.      Heart sounds: Normal heart sounds. No murmur heard.  Pulmonary:      Effort: Pulmonary effort is normal. No respiratory distress.      Breath sounds: Normal breath sounds. No wheezing, rhonchi or rales.   Abdominal:      General: Abdomen is flat. Bowel sounds are normal. There is no distension.      Palpations: Abdomen is soft.      Tenderness: There is abdominal tenderness (mild epigastric tenderness to palpation). There is no guarding or rebound.   Musculoskeletal:      Cervical back: Normal range of motion and neck supple.      Right lower leg: No edema.      Left lower leg: No edema.   Skin:     General: Skin is warm and dry.      Coloration: Skin is not jaundiced or pale.   Neurological:      Mental Status: He is alert and oriented to person, place, and time. Mental status is at baseline.   Psychiatric:         Mood and Affect: Mood normal.         Behavior: Behavior normal.         Thought Content: Thought content normal.         Judgment: Judgment normal.          Discussion with Family: Patient declined call to .     Discharge instructions/Information to patient and family:   See after visit summary for information provided to patient and family.      Provisions for Follow-Up Care:  See after visit summary for information related to follow-up care and any pertinent home health orders.      Mobility at time of Discharge:   Basic Mobility Inpatient Raw Score: 24  JH-HLM Goal: 8: Walk 250 feet or more  JH-HLM Achieved: 7: Walk 25 feet or more  HLM Goal achieved. Continue to encourage appropriate mobility.     Disposition:   Home    Planned Readmission: None    Discharge Medications:  See after visit summary for reconciled discharge medications provided to patient and/or family.      **Please Note: This note may  have been constructed using a voice recognition system**

## 2023-12-31 NOTE — ASSESSMENT & PLAN NOTE
Lab Results   Component Value Date    HGBA1C 9.0 (H) 10/02/2023       Recent Labs     12/30/23  1600 12/30/23  1938   POCGLU 227* 271*         Blood Sugar Average: Last 72 hrs:  (P) 249  PTA on insulin pump, [facility]   Possibly on Lantus 30 units nightly  Sugars  with betaOHbutyrate 1.3. No AGMA.     Plan:  Endocrinology consulted - recommendations appreciated  At this time we will start patient on insulin gtt  Q2h accuchecks while on gtt  Hypoglycemia protocol

## 2024-01-04 LAB
BACTERIA BLD CULT: NORMAL
BACTERIA BLD CULT: NORMAL

## 2024-07-02 ENCOUNTER — HOSPITAL ENCOUNTER (EMERGENCY)
Facility: HOSPITAL | Age: 59
Discharge: HOME | End: 2024-07-02
Attending: EMERGENCY MEDICINE
Payer: MEDICARE

## 2024-07-02 VITALS
OXYGEN SATURATION: 100 % | SYSTOLIC BLOOD PRESSURE: 165 MMHG | DIASTOLIC BLOOD PRESSURE: 70 MMHG | HEART RATE: 53 BPM | TEMPERATURE: 98.3 F | RESPIRATION RATE: 22 BRPM

## 2024-07-02 DIAGNOSIS — R11.2 NAUSEA & VOMITING: Primary | ICD-10-CM

## 2024-07-02 DIAGNOSIS — K31.84 GASTROPARESIS: ICD-10-CM

## 2024-07-02 LAB
ALBUMIN SERPL BCG-MCNC: 4.4 G/DL (ref 3.5–5)
ALP SERPL-CCNC: 70 U/L (ref 34–104)
ALT SERPL W P-5'-P-CCNC: 17 U/L (ref 7–52)
ANION GAP SERPL CALCULATED.3IONS-SCNC: 8 MMOL/L (ref 4–13)
AST SERPL W P-5'-P-CCNC: 20 U/L (ref 13–39)
BASOPHILS # BLD AUTO: 0.02 THOUSANDS/ÂΜL (ref 0–0.1)
BASOPHILS NFR BLD AUTO: 0 % (ref 0–1)
BILIRUB SERPL-MCNC: 0.34 MG/DL (ref 0.2–1)
BUN SERPL-MCNC: 10 MG/DL (ref 5–25)
CALCIUM SERPL-MCNC: 9.8 MG/DL (ref 8.4–10.2)
CHLORIDE SERPL-SCNC: 101 MMOL/L (ref 96–108)
CO2 SERPL-SCNC: 29 MMOL/L (ref 21–32)
CREAT SERPL-MCNC: 0.89 MG/DL (ref 0.6–1.3)
EOSINOPHIL # BLD AUTO: 0.07 THOUSAND/ÂΜL (ref 0–0.61)
EOSINOPHIL NFR BLD AUTO: 1 % (ref 0–6)
ERYTHROCYTE [DISTWIDTH] IN BLOOD BY AUTOMATED COUNT: 12.1 % (ref 11.6–15.1)
GFR SERPL CREATININE-BSD FRML MDRD: 94 ML/MIN/1.73SQ M
GLUCOSE SERPL-MCNC: 158 MG/DL (ref 65–140)
HCT VFR BLD AUTO: 38.7 % (ref 36.5–49.3)
HGB BLD-MCNC: 13.5 G/DL (ref 12–17)
IMM GRANULOCYTES # BLD AUTO: 0.03 THOUSAND/UL (ref 0–0.2)
IMM GRANULOCYTES NFR BLD AUTO: 0 % (ref 0–2)
LIPASE SERPL-CCNC: 16 U/L (ref 11–82)
LYMPHOCYTES # BLD AUTO: 1.46 THOUSANDS/ÂΜL (ref 0.6–4.47)
LYMPHOCYTES NFR BLD AUTO: 19 % (ref 14–44)
MCH RBC QN AUTO: 32.7 PG (ref 26.8–34.3)
MCHC RBC AUTO-ENTMCNC: 34.9 G/DL (ref 31.4–37.4)
MCV RBC AUTO: 94 FL (ref 82–98)
MONOCYTES # BLD AUTO: 0.61 THOUSAND/ÂΜL (ref 0.17–1.22)
MONOCYTES NFR BLD AUTO: 8 % (ref 4–12)
NEUTROPHILS # BLD AUTO: 5.65 THOUSANDS/ÂΜL (ref 1.85–7.62)
NEUTS SEG NFR BLD AUTO: 72 % (ref 43–75)
NRBC BLD AUTO-RTO: 0 /100 WBCS
PLATELET # BLD AUTO: 211 THOUSANDS/UL (ref 149–390)
PMV BLD AUTO: 9.8 FL (ref 8.9–12.7)
POTASSIUM SERPL-SCNC: 3.6 MMOL/L (ref 3.5–5.3)
PROT SERPL-MCNC: 7.6 G/DL (ref 6.4–8.4)
RBC # BLD AUTO: 4.13 MILLION/UL (ref 3.88–5.62)
SODIUM SERPL-SCNC: 138 MMOL/L (ref 135–147)
WBC # BLD AUTO: 7.84 THOUSAND/UL (ref 4.31–10.16)

## 2024-07-02 PROCEDURE — 96361 HYDRATE IV INFUSION ADD-ON: CPT

## 2024-07-02 PROCEDURE — 96374 THER/PROPH/DIAG INJ IV PUSH: CPT

## 2024-07-02 PROCEDURE — 96376 TX/PRO/DX INJ SAME DRUG ADON: CPT

## 2024-07-02 PROCEDURE — 96375 TX/PRO/DX INJ NEW DRUG ADDON: CPT

## 2024-07-02 PROCEDURE — 85025 COMPLETE CBC W/AUTO DIFF WBC: CPT | Performed by: EMERGENCY MEDICINE

## 2024-07-02 PROCEDURE — 80053 COMPREHEN METABOLIC PANEL: CPT | Performed by: EMERGENCY MEDICINE

## 2024-07-02 PROCEDURE — 99284 EMERGENCY DEPT VISIT MOD MDM: CPT

## 2024-07-02 PROCEDURE — 83690 ASSAY OF LIPASE: CPT | Performed by: EMERGENCY MEDICINE

## 2024-07-02 PROCEDURE — 36415 COLL VENOUS BLD VENIPUNCTURE: CPT

## 2024-07-02 PROCEDURE — 99284 EMERGENCY DEPT VISIT MOD MDM: CPT | Performed by: EMERGENCY MEDICINE

## 2024-07-02 RX ORDER — ONDANSETRON 4 MG/1
4 TABLET, ORALLY DISINTEGRATING ORAL EVERY 8 HOURS PRN
Qty: 20 TABLET | Refills: 0 | Status: SHIPPED | OUTPATIENT
Start: 2024-07-02 | End: 2024-07-09

## 2024-07-02 RX ORDER — HYDROMORPHONE HCL/PF 1 MG/ML
0.5 SYRINGE (ML) INJECTION ONCE
Status: COMPLETED | OUTPATIENT
Start: 2024-07-02 | End: 2024-07-02

## 2024-07-02 RX ORDER — ONDANSETRON 2 MG/ML
4 INJECTION INTRAMUSCULAR; INTRAVENOUS ONCE
Status: COMPLETED | OUTPATIENT
Start: 2024-07-02 | End: 2024-07-02

## 2024-07-02 RX ORDER — DROPERIDOL 2.5 MG/ML
0.62 INJECTION, SOLUTION INTRAMUSCULAR; INTRAVENOUS ONCE
Status: COMPLETED | OUTPATIENT
Start: 2024-07-02 | End: 2024-07-02

## 2024-07-02 RX ADMIN — SODIUM CHLORIDE 1000 ML: 0.9 INJECTION, SOLUTION INTRAVENOUS at 21:50

## 2024-07-02 RX ADMIN — ONDANSETRON 4 MG: 2 INJECTION INTRAMUSCULAR; INTRAVENOUS at 21:51

## 2024-07-02 RX ADMIN — HYDROMORPHONE HYDROCHLORIDE 0.5 MG: 1 INJECTION, SOLUTION INTRAMUSCULAR; INTRAVENOUS; SUBCUTANEOUS at 21:51

## 2024-07-02 RX ADMIN — ONDANSETRON 4 MG: 2 INJECTION INTRAMUSCULAR; INTRAVENOUS at 20:47

## 2024-07-02 RX ADMIN — DROPERIDOL 0.62 MG: 2.5 INJECTION, SOLUTION INTRAMUSCULAR; INTRAVENOUS at 20:48

## 2024-07-02 RX ADMIN — HYDROMORPHONE HYDROCHLORIDE 0.5 MG: 1 INJECTION, SOLUTION INTRAMUSCULAR; INTRAVENOUS; SUBCUTANEOUS at 20:48

## 2024-07-02 RX ADMIN — SODIUM CHLORIDE 1000 ML: 0.9 INJECTION, SOLUTION INTRAVENOUS at 20:47

## 2024-07-02 NOTE — Clinical Note
Brian Butterfield was seen and treated in our emergency department on 7/2/2024.                Diagnosis:     Brian  may return to work on return date.    He may return on this date: 07/05/2024         If you have any questions or concerns, please don't hesitate to call.      Zach Zamudio MD    ______________________________           _______________          _______________  Hospital Representative                              Date                                Time

## 2024-07-03 NOTE — ED PROVIDER NOTES
History  Chief Complaint   Patient presents with    Vomiting     Pt reports not eating for three days. Hx gastroparesis. Vomiting starting today. Took zofran PTA with no relief      HPI    Patient has a history of gastroparesis.  Started with vomiting today.  States he took Zofran at home with no relief.  Denies any chest pain or shortness of breath.  Denies any diarrhea.  Denies any fever.  States he has a history of the same.  Denies any aggravating or alleviating symptoms.  States he typically improves with IV medications but sometimes needs admission.    Prior to Admission Medications   Prescriptions Last Dose Informant Patient Reported? Taking?   PATIENT MAINTAINED INSULIN PUMP   No No   Sig: Inject 1 each under the skin every 8 (eight) hours   aluminum-magnesium hydroxide-simethicone (MAALOX MAX) 400-400-40 MG/5ML suspension   No No   Sig: Take 5 mL by mouth every 6 (six) hours as needed for indigestion or heartburn   aspirin 81 mg chewable tablet  Self Yes No   Sig: Chew 81 mg daily   busPIRone (BUSPAR) 15 mg tablet   Yes No   Sig: Take 15 mg by mouth 2 (two) times a day   clopidogrel (PLAVIX) 75 mg tablet   Yes No   Sig: Take 75 mg by mouth daily   famotidine (PEPCID) 20 mg tablet   No No   Sig: Take 1 tablet (20 mg total) by mouth 2 (two) times a day   hydrOXYzine HCL (ATARAX) 25 mg tablet   Yes No   Sig: Take 25 mg by mouth every 6 (six) hours as needed   levothyroxine 112 mcg tablet   No No   Sig: Take 1 tablet (112 mcg total) by mouth daily in the early morning   lisinopril (ZESTRIL) 2.5 mg tablet   Yes No   Sig: Take 2.5 mg by mouth daily   oxyCODONE (ROXICODONE) 15 mg immediate release tablet   Yes No   Sig: Take 15 mg by mouth every 6 (six) hours as needed for moderate pain or severe pain (For knee pain)   pantoprazole (PROTONIX) 40 mg tablet   No No   Sig: Take 1 tablet (40 mg total) by mouth daily for 14 days   umeclidinium bromide (Incruse Ellipta) 62.5 mcg/inh AEPB inhaler   Yes No   Sig: Inhale 1  Inhaler daily      Facility-Administered Medications: None       Past Medical History:   Diagnosis Date    Coronary artery disease     3 stents     Diabetes mellitus (HCC)        Past Surgical History:   Procedure Laterality Date    KNEE SURGERY         History reviewed. No pertinent family history.  I have reviewed and agree with the history as documented.    E-Cigarette/Vaping    E-Cigarette Use Never User      E-Cigarette/Vaping Substances    Nicotine No     THC Yes     CBD No      Social History     Tobacco Use    Smoking status: Never    Smokeless tobacco: Never   Vaping Use    Vaping status: Never Used   Substance Use Topics    Alcohol use: Not Currently    Drug use: Yes     Types: Marijuana     Comment: last use 2 days ago       Review of Systems   Gastrointestinal:  Positive for abdominal pain, nausea and vomiting.   All other systems reviewed and are negative.      Physical Exam  Physical Exam  Vitals and nursing note reviewed.   Constitutional:       General: He is not in acute distress.     Appearance: He is well-developed. He is not diaphoretic.   HENT:      Head: Normocephalic and atraumatic.      Right Ear: External ear normal.      Left Ear: External ear normal.   Eyes:      General:         Right eye: No discharge.         Left eye: No discharge.      Pupils: Pupils are equal, round, and reactive to light.   Neck:      Thyroid: No thyromegaly.      Trachea: No tracheal deviation.   Cardiovascular:      Rate and Rhythm: Normal rate and regular rhythm.      Heart sounds: No murmur heard.  Pulmonary:      Effort: Pulmonary effort is normal.      Breath sounds: Normal breath sounds.   Abdominal:      General: Bowel sounds are normal. There is no distension.      Palpations: Abdomen is soft.      Tenderness: There is no abdominal tenderness.   Musculoskeletal:         General: No deformity. Normal range of motion.      Cervical back: Normal range of motion and neck supple.   Skin:     General: Skin is  warm.      Capillary Refill: Capillary refill takes less than 2 seconds.   Neurological:      Mental Status: He is alert and oriented to person, place, and time.      Cranial Nerves: No cranial nerve deficit.      Motor: No abnormal muscle tone.   Psychiatric:         Behavior: Behavior normal.       Vital Signs  ED Triage Vitals   Temperature Pulse Respirations Blood Pressure SpO2   07/02/24 2003 07/02/24 2003 07/02/24 2003 07/02/24 2003 07/02/24 2003   98.3 °F (36.8 °C) (!) 53 22 165/70 100 %      Temp Source Heart Rate Source Patient Position - Orthostatic VS BP Location FiO2 (%)   07/02/24 2003 07/02/24 2003 07/02/24 2003 07/02/24 2003 --   Oral Monitor Sitting Right arm       Pain Score       07/02/24 2151       7           Vitals:    07/02/24 2003   BP: 165/70   Pulse: (!) 53   Patient Position - Orthostatic VS: Sitting         Visual Acuity      ED Medications  Medications   sodium chloride 0.9 % bolus 1,000 mL (0 mL Intravenous Stopped 7/2/24 2237)   ondansetron (ZOFRAN) injection 4 mg (4 mg Intravenous Given 7/2/24 2047)   HYDROmorphone (DILAUDID) injection 0.5 mg (0.5 mg Intravenous Given 7/2/24 2048)   droperidol (INAPSINE) injection 0.625 mg (0.625 mg Intravenous Given 7/2/24 2048)   HYDROmorphone (DILAUDID) injection 0.5 mg (0.5 mg Intravenous Given 7/2/24 2151)   ondansetron (ZOFRAN) injection 4 mg (4 mg Intravenous Given 7/2/24 2151)   sodium chloride 0.9 % bolus 1,000 mL (0 mL Intravenous Stopped 7/2/24 2237)       Diagnostic Studies  Results Reviewed       Procedure Component Value Units Date/Time    Comprehensive metabolic panel [[id number]]  (Abnormal) Collected: 07/02/24 2010    Lab Status: Final result Specimen: Blood from Arm, Right Updated: 07/02/24 2034     Sodium 138 mmol/L      Potassium 3.6 mmol/L      Chloride 101 mmol/L      CO2 29 mmol/L      ANION GAP 8 mmol/L      BUN 10 mg/dL      Creatinine 0.89 mg/dL      Glucose 158 mg/dL      Calcium 9.8 mg/dL      AST 20 U/L      ALT 17 U/L       Alkaline Phosphatase 70 U/L      Total Protein 7.6 g/dL      Albumin 4.4 g/dL      Total Bilirubin 0.34 mg/dL      eGFR 94 ml/min/1.73sq m     Narrative:      National Kidney Disease Foundation guidelines for Chronic Kidney Disease (CKD):     Stage 1 with normal or high GFR (GFR > 90 mL/min/1.73 square meters)    Stage 2 Mild CKD (GFR = 60-89 mL/min/1.73 square meters)    Stage 3A Moderate CKD (GFR = 45-59 mL/min/1.73 square meters)    Stage 3B Moderate CKD (GFR = 30-44 mL/min/1.73 square meters)    Stage 4 Severe CKD (GFR = 15-29 mL/min/1.73 square meters)    Stage 5 End Stage CKD (GFR <15 mL/min/1.73 square meters)  Note: GFR calculation is accurate only with a steady state creatinine    Lipase [[id number]]  (Normal) Collected: 07/02/24 2010    Lab Status: Final result Specimen: Blood from Arm, Right Updated: 07/02/24 2034     Lipase 16 u/L     CBC and differential [[id number]] Collected: 07/02/24 2010    Lab Status: Final result Specimen: Blood from Arm, Right Updated: 07/02/24 2018     WBC 7.84 Thousand/uL      RBC 4.13 Million/uL      Hemoglobin 13.5 g/dL      Hematocrit 38.7 %      MCV 94 fL      MCH 32.7 pg      MCHC 34.9 g/dL      RDW 12.1 %      MPV 9.8 fL      Platelets 211 Thousands/uL      nRBC 0 /100 WBCs      Segmented % 72 %      Immature Grans % 0 %      Lymphocytes % 19 %      Monocytes % 8 %      Eosinophils Relative 1 %      Basophils Relative 0 %      Absolute Neutrophils 5.65 Thousands/µL      Absolute Immature Grans 0.03 Thousand/uL      Absolute Lymphocytes 1.46 Thousands/µL      Absolute Monocytes 0.61 Thousand/µL      Eosinophils Absolute 0.07 Thousand/µL      Basophils Absolute 0.02 Thousands/µL                    No orders to display              Procedures  Procedures         ED Course  ED Course as of 07/02/24 2254 Tue Jul 02, 2024 2211 Patient reassessed.  Sleeping comfortably.  When awoken, states he is starting to feel better.                                             Medical  Decision Making  Likely gastroparesis.   Abdomen soft, nontender, nondistended.  No evidence of obstruction on examination.  Laboratory studies at baseline.  IV Dilaudid, Zofran, droperidol, IV fluids.  Will reassess symptoms and if able to tolerate oral intake, likely discharge home, if unable to tolerate oral intake, may need admission.    Patient feels better after ED treatment.  No ongoing vomiting.  Pain improved.  Laboratory studies unremarkable.  Will discharge home with p.o. Zofran as needed at home, PCP follow-up.    Amount and/or Complexity of Data Reviewed  Labs: ordered.    Risk  Prescription drug management.             Disposition  Final diagnoses:   Nausea & vomiting   Gastroparesis     Time reflects when diagnosis was documented in both MDM as applicable and the Disposition within this note       Time User Action Codes Description Comment    7/2/2024 10:13 PM Zach Zamudio [R11.2] Nausea & vomiting     7/2/2024 10:14 PM Zach Zamudio [K31.84] Gastroparesis           ED Disposition       ED Disposition   Discharge    Condition   Stable    Date/Time   Tue Jul 2, 2024 10:13 PM    Comment   Brian Butterfield discharge to home/self care.                   Follow-up Information       Follow up With Specialties Details Why Contact Info Additional Information    [facility] Schedule an appointment as soon as possible for a visit in 1 week As needed [address] [place] [phone] [facility], [address] [place] [address], [phone]    Formerly [facility], [facility] Emergency Department Emergency Medicine Go to  As needed, If symptoms worsen [facility] [address]  [phone] [facility], [facility] Emergency Department, [address], [place], [address]            Discharge Medication List as of 7/2/2024 10:17 PM        START taking  these medications    Details   ondansetron (ZOFRAN-ODT) 4 mg disintegrating tablet Take 1 tablet (4 mg total) by mouth every 8 (eight) hours as needed for vomiting or nausea for up to 7 days, Starting Tue 7/2/2024, Until Tue 7/9/2024 at 2359, Print           CONTINUE these medications which have NOT CHANGED    Details   aluminum-magnesium hydroxide-simethicone (MAALOX MAX) 400-400-40 MG/5ML suspension Take 5 mL by mouth every 6 (six) hours as needed for indigestion or heartburn, Starting Sun 12/31/2023, Normal      aspirin 81 mg chewable tablet Chew 81 mg daily, Historical Med      busPIRone (BUSPAR) 15 mg tablet Take 15 mg by mouth 2 (two) times a day, Starting Wed 2/8/2023, Until Thu 2/8/2024, Historical Med      clopidogrel (PLAVIX) 75 mg tablet Take 75 mg by mouth daily, Starting Wed 12/23/2020, Until Thu 12/23/2021, Historical Med      famotidine (PEPCID) 20 mg tablet Take 1 tablet (20 mg total) by mouth 2 (two) times a day, Starting Sun 12/31/2023, Until Tue 1/30/2024, Normal      hydrOXYzine HCL (ATARAX) 25 mg tablet Take 25 mg by mouth every 6 (six) hours as needed, Starting Thu 11/2/2023, Historical Med      levothyroxine 112 mcg tablet Take 1 tablet (112 mcg total) by mouth daily in the early morning, Starting Mon 1/1/2024, No Print      lisinopril (ZESTRIL) 2.5 mg tablet Take 2.5 mg by mouth daily, Starting Mon 8/21/2023, Until Tue 8/20/2024, Historical Med      oxyCODONE (ROXICODONE) 15 mg immediate release tablet Take 15 mg by mouth every 6 (six) hours as needed for moderate pain or severe pain (For knee pain), Starting Wed 12/20/2023, Historical Med      pantoprazole (PROTONIX) 40 mg tablet Take 1 tablet (40 mg total) by mouth daily for 14 days, Starting Sun 12/31/2023, Until Sun 1/14/2024, Normal      PATIENT MAINTAINED INSULIN PUMP Inject 1 each under the skin every 8 (eight) hours, Starting Thu 5/6/2021, No Print      umeclidinium bromide (Incruse Ellipta) 62.5 mcg/inh AEPB inhaler Inhale 1 Inhaler  daily, Starting Mon 4/19/2021, Historical Med             No discharge procedures on file.    PDMP Review       None            ED Provider  Electronically Signed by             Zach Zamudio MD  07/02/24 [id number]

## 2024-09-19 ENCOUNTER — APPOINTMENT (EMERGENCY)
Dept: RADIOLOGY | Facility: HOSPITAL | Age: 59
End: 2024-09-19
Payer: MEDICARE

## 2024-09-19 ENCOUNTER — HOSPITAL ENCOUNTER (EMERGENCY)
Facility: HOSPITAL | Age: 59
Discharge: HOME | End: 2024-09-19
Attending: EMERGENCY MEDICINE | Admitting: EMERGENCY MEDICINE
Payer: MEDICARE

## 2024-09-19 ENCOUNTER — APPOINTMENT (EMERGENCY)
Dept: CT IMAGING | Facility: HOSPITAL | Age: 59
End: 2024-09-19
Payer: MEDICARE

## 2024-09-19 VITALS
HEART RATE: 54 BPM | OXYGEN SATURATION: 98 % | RESPIRATION RATE: 16 BRPM | SYSTOLIC BLOOD PRESSURE: 141 MMHG | DIASTOLIC BLOOD PRESSURE: 71 MMHG | TEMPERATURE: 97.9 F

## 2024-09-19 DIAGNOSIS — M25.519 SHOULDER PAIN: Primary | ICD-10-CM

## 2024-09-19 DIAGNOSIS — M54.9 BACK PAIN: ICD-10-CM

## 2024-09-19 DIAGNOSIS — M89.8X1 CHRONIC SCAPULAR PAIN: ICD-10-CM

## 2024-09-19 DIAGNOSIS — G89.29 CHRONIC SCAPULAR PAIN: ICD-10-CM

## 2024-09-19 LAB
2HR DELTA HS TROPONIN: 0 NG/L
ALBUMIN SERPL BCG-MCNC: 4.1 G/DL (ref 3.5–5)
ALP SERPL-CCNC: 67 U/L (ref 34–104)
ALT SERPL W P-5'-P-CCNC: 16 U/L (ref 7–52)
ANION GAP SERPL CALCULATED.3IONS-SCNC: 6 MMOL/L (ref 4–13)
AST SERPL W P-5'-P-CCNC: 25 U/L (ref 13–39)
BASOPHILS # BLD AUTO: 0.01 THOUSANDS/ΜL (ref 0–0.1)
BASOPHILS NFR BLD AUTO: 0 % (ref 0–1)
BILIRUB SERPL-MCNC: 0.32 MG/DL (ref 0.2–1)
BUN SERPL-MCNC: 9 MG/DL (ref 5–25)
CALCIUM SERPL-MCNC: 9.2 MG/DL (ref 8.4–10.2)
CARDIAC TROPONIN I PNL SERPL HS: 7 NG/L
CARDIAC TROPONIN I PNL SERPL HS: 7 NG/L
CHLORIDE SERPL-SCNC: 102 MMOL/L (ref 96–108)
CO2 SERPL-SCNC: 29 MMOL/L (ref 21–32)
CREAT SERPL-MCNC: 0.81 MG/DL (ref 0.6–1.3)
D DIMER PPP FEU-MCNC: 0.61 UG/ML FEU
EOSINOPHIL # BLD AUTO: 0.04 THOUSAND/ΜL (ref 0–0.61)
EOSINOPHIL NFR BLD AUTO: 1 % (ref 0–6)
ERYTHROCYTE [DISTWIDTH] IN BLOOD BY AUTOMATED COUNT: 12.6 % (ref 11.6–15.1)
GFR SERPL CREATININE-BSD FRML MDRD: 98 ML/MIN/1.73SQ M
GLUCOSE SERPL-MCNC: 118 MG/DL (ref 65–140)
HCT VFR BLD AUTO: 40.6 % (ref 36.5–49.3)
HGB BLD-MCNC: 13.7 G/DL (ref 12–17)
IMM GRANULOCYTES # BLD AUTO: 0.01 THOUSAND/UL (ref 0–0.2)
IMM GRANULOCYTES NFR BLD AUTO: 0 % (ref 0–2)
LYMPHOCYTES # BLD AUTO: 1.08 THOUSANDS/ΜL (ref 0.6–4.47)
LYMPHOCYTES NFR BLD AUTO: 26 % (ref 14–44)
MCH RBC QN AUTO: 31.9 PG (ref 26.8–34.3)
MCHC RBC AUTO-ENTMCNC: 33.7 G/DL (ref 31.4–37.4)
MCV RBC AUTO: 95 FL (ref 82–98)
MONOCYTES # BLD AUTO: 0.35 THOUSAND/ΜL (ref 0.17–1.22)
MONOCYTES NFR BLD AUTO: 8 % (ref 4–12)
NEUTROPHILS # BLD AUTO: 2.75 THOUSANDS/ΜL (ref 1.85–7.62)
NEUTS SEG NFR BLD AUTO: 65 % (ref 43–75)
NRBC BLD AUTO-RTO: 0 /100 WBCS
PLATELET # BLD AUTO: 178 THOUSANDS/UL (ref 149–390)
PMV BLD AUTO: 10.6 FL (ref 8.9–12.7)
POTASSIUM SERPL-SCNC: 4.3 MMOL/L (ref 3.5–5.3)
PROT SERPL-MCNC: 7.5 G/DL (ref 6.4–8.4)
RBC # BLD AUTO: 4.29 MILLION/UL (ref 3.88–5.62)
SODIUM SERPL-SCNC: 137 MMOL/L (ref 135–147)
WBC # BLD AUTO: 4.24 THOUSAND/UL (ref 4.31–10.16)

## 2024-09-19 PROCEDURE — 80053 COMPREHEN METABOLIC PANEL: CPT | Performed by: EMERGENCY MEDICINE

## 2024-09-19 PROCEDURE — 99285 EMERGENCY DEPT VISIT HI MDM: CPT

## 2024-09-19 PROCEDURE — 84484 ASSAY OF TROPONIN QUANT: CPT | Performed by: EMERGENCY MEDICINE

## 2024-09-19 PROCEDURE — 36415 COLL VENOUS BLD VENIPUNCTURE: CPT

## 2024-09-19 PROCEDURE — 71045 X-RAY EXAM CHEST 1 VIEW: CPT

## 2024-09-19 PROCEDURE — 96375 TX/PRO/DX INJ NEW DRUG ADDON: CPT

## 2024-09-19 PROCEDURE — 96374 THER/PROPH/DIAG INJ IV PUSH: CPT

## 2024-09-19 PROCEDURE — 71275 CT ANGIOGRAPHY CHEST: CPT

## 2024-09-19 PROCEDURE — 99285 EMERGENCY DEPT VISIT HI MDM: CPT | Performed by: EMERGENCY MEDICINE

## 2024-09-19 PROCEDURE — 93005 ELECTROCARDIOGRAM TRACING: CPT

## 2024-09-19 PROCEDURE — 73030 X-RAY EXAM OF SHOULDER: CPT

## 2024-09-19 PROCEDURE — 85379 FIBRIN DEGRADATION QUANT: CPT

## 2024-09-19 PROCEDURE — 85025 COMPLETE CBC W/AUTO DIFF WBC: CPT | Performed by: EMERGENCY MEDICINE

## 2024-09-19 RX ORDER — LIDOCAINE 50 MG/G
1 PATCH TOPICAL ONCE
Status: DISCONTINUED | OUTPATIENT
Start: 2024-09-19 | End: 2024-09-19 | Stop reason: HOSPADM

## 2024-09-19 RX ORDER — METHOCARBAMOL 500 MG/1
500 TABLET, FILM COATED ORAL 2 TIMES DAILY
Qty: 20 TABLET | Refills: 0 | Status: SHIPPED | OUTPATIENT
Start: 2024-09-19 | End: 2024-09-19

## 2024-09-19 RX ORDER — DIAZEPAM 10 MG/2ML
5 INJECTION, SOLUTION INTRAMUSCULAR; INTRAVENOUS ONCE
Status: COMPLETED | OUTPATIENT
Start: 2024-09-19 | End: 2024-09-19

## 2024-09-19 RX ORDER — ACETAMINOPHEN 10 MG/ML
1000 INJECTION, SOLUTION INTRAVENOUS ONCE
Status: COMPLETED | OUTPATIENT
Start: 2024-09-19 | End: 2024-09-19

## 2024-09-19 RX ORDER — METHOCARBAMOL 500 MG/1
500 TABLET, FILM COATED ORAL 2 TIMES DAILY
Qty: 20 TABLET | Refills: 0 | Status: SHIPPED | OUTPATIENT
Start: 2024-09-19

## 2024-09-19 RX ORDER — KETOROLAC TROMETHAMINE 30 MG/ML
15 INJECTION, SOLUTION INTRAMUSCULAR; INTRAVENOUS ONCE
Status: COMPLETED | OUTPATIENT
Start: 2024-09-19 | End: 2024-09-19

## 2024-09-19 RX ORDER — MORPHINE SULFATE 4 MG/ML
4 INJECTION, SOLUTION INTRAMUSCULAR; INTRAVENOUS ONCE
Status: COMPLETED | OUTPATIENT
Start: 2024-09-19 | End: 2024-09-19

## 2024-09-19 RX ORDER — METHOCARBAMOL 500 MG/1
500 TABLET, FILM COATED ORAL ONCE
Status: COMPLETED | OUTPATIENT
Start: 2024-09-19 | End: 2024-09-19

## 2024-09-19 RX ADMIN — MORPHINE SULFATE 4 MG: 4 INJECTION INTRAVENOUS at 13:45

## 2024-09-19 RX ADMIN — KETOROLAC TROMETHAMINE 15 MG: 30 INJECTION, SOLUTION INTRAMUSCULAR; INTRAVENOUS at 16:59

## 2024-09-19 RX ADMIN — LIDOCAINE 1 PATCH: 700 PATCH TOPICAL at 17:50

## 2024-09-19 RX ADMIN — IOHEXOL 62 ML: 350 INJECTION, SOLUTION INTRAVENOUS at 16:37

## 2024-09-19 RX ADMIN — METHOCARBAMOL 500 MG: 500 TABLET ORAL at 14:46

## 2024-09-19 RX ADMIN — ACETAMINOPHEN 1000 MG: 10 INJECTION INTRAVENOUS at 14:46

## 2024-09-19 RX ADMIN — DIAZEPAM 5 MG: 10 INJECTION, SOLUTION INTRAMUSCULAR; INTRAVENOUS at 17:50

## 2024-09-19 RX ADMIN — LIDOCAINE 1 PATCH: 700 PATCH TOPICAL at 14:46

## 2024-09-19 NOTE — DISCHARGE INSTRUCTIONS
Follow-up with comprehensive spine in regards to your back pain.  You may also continue to follow-up with your orthopedic doctor to discuss your shoulder pain.  Today we saw no fractures on x-rays and no dislocations.  Pain was managed with lidocaine patches, Toradol, Valium.

## 2024-09-19 NOTE — ED PROVIDER NOTES
1. Shoulder pain    2. Chronic scapular pain    3. Back pain      ED Disposition       ED Disposition   Discharge    Condition   Stable    Date/Time   Thu Sep 19, 2024  6:32 PM    Comment   Brian Butterfield discharge to home/self care.                   Assessment & Plan       Medical Decision Making  59-year-old male presenting to the ED with left shoulder and scapular pain as well as paresthesias in the left arm and left leg.    Differential includes musculoskeletal pain, rotator cuff injury, dislocation, sprain, strain, arthritis.  Will rule out ACS versus MI.    CBC, CMP, troponin ordered.  Will order a chest x-ray and left shoulder x-ray.    Patient will be given 4 mg of morphine for pain management at this time.    Following morphine, patient was treated with lidocaine patch, Robaxin, Toradol.  Patient stated he did have relief with lidocaine patch and Robaxin.    Patient will be referred to comprehensive spine and a prescription for Robaxin will be given.  Patient is stable at time of discharge.  Patient is comfortable with discharge and is able to ambulate appropriately.  Patient discharged.    See ED course for interpretation of results and management.    Amount and/or Complexity of Data Reviewed  Labs: ordered. Decision-making details documented in ED Course.  Radiology: ordered. Decision-making details documented in ED Course.    Risk  Prescription drug management.                ED Course as of 09/19/24 1958   Thu Sep 19, 2024   1337 Scapular pain for a few days   1413 hs TnI 0hr: 7  Will delta   1413 Comprehensive metabolic panel  Unremarkable and reassuring CMP   1413 CBC and differential(!)  Mildly decreased white blood cell count.  No signs of infection, no signs of anemia.   1453 IMPRESSION:     No acute osseous abnormality.        Computerized Assisted Algorithm (CAA) may have been used to analyze all applicable images.        Workstation performed: [id number]     1453 IMPRESSION:     No acute  cardiopulmonary disease.           Workstation performed: [id number]     1703 D-Dimer, Quant(!): 0.61  Ct pe study ordered   1703 CTA ED chest PE study  IMPRESSION:     No pulmonary embolism.     No acute pulmonary pathology.     Workstation performed: [id number]     1705 hs TnI 2hr: 7  reassuring   1743 Patient continuously reassessed.  Continuing to say pain in left shoulder however pain in the left rib area is improved with lidocaine patch.  Will do lidocaine patch to left shoulder and attempt Toradol with low-dose Valium.       Medications   lidocaine (LIDODERM) 5 % patch 1 patch (1 patch Topical Medication Applied 9/19/24 1446)   lidocaine (LIDODERM) 5 % patch 1 patch (1 patch Topical Medication Applied 9/19/24 1750)   morphine injection 4 mg (4 mg Intravenous Given 9/19/24 1345)   acetaminophen (Ofirmev) injection 1,000 mg (0 mg Intravenous Stopped 9/19/24 1501)   methocarbamol (ROBAXIN) tablet 500 mg (500 mg Oral Given 9/19/24 1446)   iohexol (OMNIPAQUE) 350 MG/ML injection (MULTI-DOSE) 62 mL (62 mL Intravenous Given 9/19/24 1637)   ketorolac (TORADOL) injection 15 mg (15 mg Intravenous Given 9/19/24 1659)   diazepam (VALIUM) injection 5 mg (5 mg Intravenous Given 9/19/24 1750)       History of Present Illness       59-year-old male presenting to the ED for complaint of left shoulder pain that radiates up to the neck and down the arm.  Patient states that the pain has been ongoing for approximately 2 to 3 days but worsened to 10 out of 10 when he woke up from his sleep today.  Patient denies any trauma to the region, lifting, work involving movement of the arm.  Patient does endorse fishing however over a week ago.  Patient does have a history of a left heart stent states that he does have some mild pain on the left upper shoulder by his chest but no pain down into the chest.  Patient does endorse lightheadedness when the pain is exacerbated.  Patient's denying palpitations, leg swelling, headache, loss of  consciousness.  Patient has tried lidocaine patches but the patches have not provided relief.        Review of Systems   Constitutional:  Negative for chills and fever.   HENT:  Negative for congestion.    Eyes:  Negative for visual disturbance.   Respiratory:  Negative for chest tightness and shortness of breath.    Cardiovascular:  Positive for chest pain. Negative for palpitations.   Gastrointestinal:  Negative for abdominal pain, diarrhea, nausea and vomiting.   Genitourinary:  Negative for dysuria.   Musculoskeletal:  Positive for neck pain.   Skin:  Negative for color change and pallor.   Neurological:  Positive for light-headedness. Negative for weakness and headaches.           Objective     ED Triage Vitals   Temperature Pulse Blood Pressure Respirations SpO2 Patient Position - Orthostatic VS   09/19/24 1250 09/19/24 1250 09/19/24 1250 09/19/24 1250 09/19/24 1250 09/19/24 1250   97.9 °F (36.6 °C) (!) 54 125/59 16 100 % Sitting      Temp Source Heart Rate Source BP Location FiO2 (%) Pain Score    09/19/24 1250 09/19/24 1250 09/19/24 1250 -- 09/19/24 1345    Oral Monitor Right arm  10 - Worst Possible Pain        Physical Exam  Constitutional:       Appearance: He is well-developed.   HENT:      Head: Normocephalic and atraumatic.   Cardiovascular:      Rate and Rhythm: Normal rate and regular rhythm.      Heart sounds: Normal heart sounds.   Pulmonary:      Effort: Pulmonary effort is normal.      Breath sounds: Normal breath sounds.   Abdominal:      General: Bowel sounds are normal.   Musculoskeletal:         General: Normal range of motion.      Comments: With encouragement, patient had equal strength bilaterally.  No arm drift, no facial droop, no slurred speech.   Skin:     General: Skin is warm.      Capillary Refill: Capillary refill takes less than 2 seconds.   Neurological:      General: No focal deficit present.      Mental Status: He is alert and oriented to person, place, and time.      GCS: GCS  eye subscore is 4. GCS verbal subscore is 5. GCS motor subscore is 6.      Cranial Nerves: Cranial nerves 2-12 are intact.      Sensory: Sensation is intact.      Motor: Motor function is intact.      Coordination: Coordination is intact.      Gait: Gait is intact.         Labs Reviewed   CBC AND DIFFERENTIAL - Abnormal       Result Value    WBC 4.24 (*)     RBC 4.29      Hemoglobin 13.7      Hematocrit 40.6      MCV 95      MCH 31.9      MCHC 33.7      RDW 12.6      MPV 10.6      Platelets 178      nRBC 0      Segmented % 65      Immature Grans % 0      Lymphocytes % 26      Monocytes % 8      Eosinophils Relative 1      Basophils Relative 0      Absolute Neutrophils 2.75      Absolute Immature Grans 0.01      Absolute Lymphocytes 1.08      Absolute Monocytes 0.35      Eosinophils Absolute 0.04      Basophils Absolute 0.01     D-DIMER, QUANTITATIVE - Abnormal    D-Dimer, Quant 0.61 (*)     Narrative:     In the evaluation for possible pulmonary embolism, in the appropriate (Well's Score of 4 or less) patient, the age adjusted d-dimer cutoff for this patient can be calculated as:    Age x 0.01 (in ug/mL) for Age-adjusted D-dimer exclusion threshold for a patient over 50 years.   HS TROPONIN I 0HR - Normal    hs TnI 0hr 7     HS TROPONIN I 2HR - Normal    hs TnI 2hr 7      Delta 2hr hsTnI 0     COMPREHENSIVE METABOLIC PANEL    Sodium 137      Potassium 4.3      Chloride 102      CO2 29      ANION GAP 6      BUN 9      Creatinine 0.81      Glucose 118      Calcium 9.2      AST 25      ALT 16      Alkaline Phosphatase 67      Total Protein 7.5      Albumin 4.1      Total Bilirubin 0.32      eGFR 98      Narrative:     National Kid[employer] guidelines for Chronic Kidney Disease (CKD):     Stage 1 with normal or high GFR (GFR > 90 mL/min/1.73 square meters)    Stage 2 Mild CKD (GFR = 60-89 mL/min/1.73 square meters)    Stage 3A Moderate CKD (GFR = 45-59 mL/min/1.73 square meters)    Stage 3B Moderate CKD  (GFR = 30-44 mL/min/1.73 square meters)    Stage 4 Severe CKD (GFR = 15-29 mL/min/1.73 square meters)    Stage 5 End Stage CKD (GFR <15 mL/min/1.73 square meters)  Note: GFR calculation is accurate only with a steady state creatinine   LIGHT BLUE TOP     CTA ED chest PE study   Final Interpretation by Alexandrea Katz MD (09/19 1659)      No pulmonary embolism.      No acute pulmonary pathology.      Workstation performed: [id number]         XR shoulder 2+ views LEFT   Final Interpretation by Sid Smalls MD (09/19 1448)      No acute osseous abnormality.         Computerized Assisted Algorithm (CAA) may have been used to analyze all applicable images.         Workstation performed: [id number]         XR chest 1 view portable   Final Interpretation by Sid Smalls MD (09/19 1448)      No acute cardiopulmonary disease.            Workstation performed: [id number]             ECG 12 Lead Documentation Only    Date/Time: 9/19/2024 2:13 PM    Performed by: Hardeep Reed MD  Authorized by: Hardeep Reed MD    Indications / Diagnosis:  Chest pain  ECG reviewed by me, the ED Provider: yes    Patient location:  ED  Previous ECG:     Previous ECG:  Compared to current    Similarity:  Changes noted  Interpretation:     Interpretation: normal    Rate:     ECG rate:  75    ECG rate assessment: normal    Rhythm:     Rhythm: sinus rhythm    Ectopy:     Ectopy: none    QRS:     QRS axis:  Normal    QRS intervals:  Normal  Conduction:     Conduction: normal    ST segments:     ST segments:  Normal  T waves:     T waves: normal        ED Medication and Procedure Management   Prior to Admission Medications   Prescriptions Last Dose Informant Patient Reported? Taking?   PATIENT MAINTAINED INSULIN PUMP   No No   Sig: Inject 1 each under the skin every 8 (eight) hours   aluminum-magnesium hydroxide-simethicone (MAALOX MAX) 400-400-40 MG/5ML suspension   No No   Sig: Take 5 mL by mouth every 6 (six) hours as needed for indigestion  or heartburn   aspirin 81 mg chewable tablet  Self Yes No   Sig: Chew 81 mg daily   busPIRone (BUSPAR) 15 mg tablet   Yes No   Sig: Take 15 mg by mouth 2 (two) times a day   clopidogrel (PLAVIX) 75 mg tablet   Yes No   Sig: Take 75 mg by mouth daily   famotidine (PEPCID) 20 mg tablet   No No   Sig: Take 1 tablet (20 mg total) by mouth 2 (two) times a day   hydrOXYzine HCL (ATARAX) 25 mg tablet   Yes No   Sig: Take 25 mg by mouth every 6 (six) hours as needed   levothyroxine 112 mcg tablet   No No   Sig: Take 1 tablet (112 mcg total) by mouth daily in the early morning   lisinopril (ZESTRIL) 2.5 mg tablet   Yes No   Sig: Take 2.5 mg by mouth daily   ondansetron (ZOFRAN-ODT) 4 mg disintegrating tablet   No No   Sig: Take 1 tablet (4 mg total) by mouth every 8 (eight) hours as needed for vomiting or nausea for up to 7 days   oxyCODONE (ROXICODONE) 15 mg immediate release tablet   Yes No   Sig: Take 15 mg by mouth every 6 (six) hours as needed for moderate pain or severe pain (For knee pain)   pantoprazole (PROTONIX) 40 mg tablet   No No   Sig: Take 1 tablet (40 mg total) by mouth daily for 14 days   umeclidinium bromide (Incruse Ellipta) 62.5 mcg/inh AEPB inhaler   Yes No   Sig: Inhale 1 Inhaler daily      Facility-Administered Medications: None     Discharge Medication List as of 9/19/2024  6:34 PM        CONTINUE these medications which have NOT CHANGED    Details   aluminum-magnesium hydroxide-simethicone (MAALOX MAX) 400-400-40 MG/5ML suspension Take 5 mL by mouth every 6 (six) hours as needed for indigestion or heartburn, Starting Sun 12/31/2023, Normal      aspirin 81 mg chewable tablet Chew 81 mg daily, Historical Med      busPIRone (BUSPAR) 15 mg tablet Take 15 mg by mouth 2 (two) times a day, Starting Wed 2/8/2023, Until Thu 2/8/2024, Historical Med      clopidogrel (PLAVIX) 75 mg tablet Take 75 mg by mouth daily, Starting Wed 12/23/2020, Until Thu 12/23/2021, Historical Med      famotidine (PEPCID) 20 mg  tablet Take 1 tablet (20 mg total) by mouth 2 (two) times a day, Starting Sun 12/31/2023, Until Tue 1/30/2024, Normal      hydrOXYzine HCL (ATARAX) 25 mg tablet Take 25 mg by mouth every 6 (six) hours as needed, Starting Thu 11/2/2023, Historical Med      levothyroxine 112 mcg tablet Take 1 tablet (112 mcg total) by mouth daily in the early morning, Starting Mon 1/1/2024, No Print      lisinopril (ZESTRIL) 2.5 mg tablet Take 2.5 mg by mouth daily, Starting Mon 8/21/2023, Until Tue 8/20/2024, Historical Med      ondansetron (ZOFRAN-ODT) 4 mg disintegrating tablet Take 1 tablet (4 mg total) by mouth every 8 (eight) hours as needed for vomiting or nausea for up to 7 days, Starting Tue 7/2/2024, Until Tue 7/9/2024 at 2359, Print      oxyCODONE (ROXICODONE) 15 mg immediate release tablet Take 15 mg by mouth every 6 (six) hours as needed for moderate pain or severe pain (For knee pain), Starting Wed 12/20/2023, Historical Med      pantoprazole (PROTONIX) 40 mg tablet Take 1 tablet (40 mg total) by mouth daily for 14 days, Starting Sun 12/31/2023, Until Sun 1/14/2024, Normal      PATIENT MAINTAINED INSULIN PUMP Inject 1 each under the skin every 8 (eight) hours, Starting Thu 5/6/2021, No Print      umeclidinium bromide (Incruse Ellipta) 62.5 mcg/inh AEPB inhaler Inhale 1 Inhaler daily, Starting Mon 4/19/2021, Historical Med                Hardeep Reed MD  09/19/24 1958

## 2024-09-20 ENCOUNTER — NURSE TRIAGE (OUTPATIENT)
Dept: PHYSICAL THERAPY | Facility: OTHER | Age: 59
End: 2024-09-20
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
## 2024-09-20 NOTE — TELEPHONE ENCOUNTER
Called the patient to complete the triage process started today at 10:15 am. Call went to .    Voice message left for patient to call back. Phone number and hours of business provided.     Referral Closed.  Triage will be completed if a call back is received.

## 2024-09-21 LAB
ATRIAL RATE: 75 BPM
P AXIS: 37 DEGREES
PR INTERVAL: 144 MS
QRS AXIS: 216 DEGREES
QRSD INTERVAL: 88 MS
QT INTERVAL: 362 MS
QTC INTERVAL: 404 MS
T WAVE AXIS: -2 DEGREES
VENTRICULAR RATE: 75 BPM

## 2024-09-21 PROCEDURE — 93010 ELECTROCARDIOGRAM REPORT: CPT | Performed by: INTERNAL MEDICINE

## 2024-09-23 NOTE — ED ATTENDING ATTESTATION
9/19/2024  I, Starr Funes MD, saw and evaluated the patient. I have discussed the patient with the resident/non-physician practitioner and agree with the resident's/non-physician practitioner's findings, Plan of Care, and MDM as documented in the resident's/non-physician practitioner's note, except where noted. All available labs and Radiology studies were reviewed.  I was present for key portions of any procedure(s) performed by the resident/non-physician practitioner and I was immediately available to provide assistance.       At this point I agree with the current assessment done in the Emergency Department.  I have conducted an independent evaluation of this patient a history and physical is as follows:    Patient is a 59-year-old male presents to the emergency department for evaluation with left-sided chest  Discomfort.  He awoke with this a few days ago and appreciates pain radiating down his left arm, into the left upper back and lateral aspect of the left ribs.  Discomfort has been continuous with waxing and waning.  He has appreciated intermittent paresthesias although no swelling or discoloration in the left upper extremity.  He denies awareness of any trauma, unusual positioning or lifting which may have triggered symptoms.  Pain is worse with movement including that with deep breaths.  He denies having felt ill with any nasal congestion, sore throat, cough, hemoptysis or generalized myalgias or fever.  He has not appreciated new leg swelling or discomfort.  History of extensive left leg surgery remotely.  Over the medial aspect of the left lower leg there is an area of skin covered hardware protrusion.  He does have chronic pain in the left lower extremity and notes that over the last week as a result of this pain as well as over his chest discomfort he has increased his oxycodone dosing from 15 mg at a time to 30 mg at a time.  Last dose taken yesterday.  He denies that this provided  much relief for his chest pain.  He has not appreciated any rashes.  Past medical history includes hypertension, hyperlipidemia, CAD and COPD.  He has tried lidocaine patches without much relief.    On exam patient appears very uncomfortable especially with position change.  Mucous membranes are moist.  Heart sounds regular and lungs are clear to auscultation.  No midline cervical tenderness appreciated.  He does have tenderness in the upper to mid thoracic region centrally.  No right-sided thoracic tenderness..  He does have exquisite tenderness over the lateral aspect of the left chest wall and anterior aspect of the left upper chest.  No overlying skin changes.  Tenderness extends to trapezius.  Abdomen is soft and nontender.  There is no swelling of the lower legs.  The right is nontender and PT pulses are 2+.  He does have tenderness in the left calf and knee which he attributes to chronic changes since injury and surgery.  No erythema or increased warmth.  Good strength in the upper extremities with all movements.  The left arm itself is nontender.    Differential diagnosis includes but is not limited to muscle strain, cervical/thoracic radiculopathy, ACS, pleurisy, pneumothorax, PE, GERD.  Doubt zoster without overlying skin change or hyperesthesia.  Obtaining analgesia anticipated to be difficult given chronic opioid use/tolerance.  Will use combination of medications as testing is underway and consideration of varied etiologies.  Musculoskeletal etiology is most strongly suspected.  Likely for referral to comprehensive [facility] center.    ED Course         Critical Care Time  Procedures

## 2025-03-15 ENCOUNTER — HOSPITAL ENCOUNTER (INPATIENT)
Facility: HOSPITAL | Age: 60
LOS: 5 days | Discharge: HOME | DRG: 074 | End: 2025-03-21
Attending: EMERGENCY MEDICINE | Admitting: INTERNAL MEDICINE
Payer: MEDICARE

## 2025-03-15 ENCOUNTER — APPOINTMENT (EMERGENCY)
Dept: RADIOLOGY | Facility: HOSPITAL | Age: 60
DRG: 074 | End: 2025-03-15
Payer: MEDICARE

## 2025-03-15 ENCOUNTER — APPOINTMENT (EMERGENCY)
Dept: CT IMAGING | Facility: HOSPITAL | Age: 60
DRG: 074 | End: 2025-03-15
Payer: MEDICARE

## 2025-03-15 DIAGNOSIS — R10.9 INTRACTABLE ABDOMINAL PAIN: ICD-10-CM

## 2025-03-15 DIAGNOSIS — R07.9 CHEST PAIN: ICD-10-CM

## 2025-03-15 DIAGNOSIS — R11.0 NAUSEA: ICD-10-CM

## 2025-03-15 DIAGNOSIS — R19.5 DARK STOOLS: ICD-10-CM

## 2025-03-15 DIAGNOSIS — K52.9 COLITIS: ICD-10-CM

## 2025-03-15 DIAGNOSIS — R10.13 EPIGASTRIC PAIN: ICD-10-CM

## 2025-03-15 DIAGNOSIS — R10.9 ABDOMINAL PAIN: Primary | ICD-10-CM

## 2025-03-15 DIAGNOSIS — K29.70 GASTRITIS: ICD-10-CM

## 2025-03-15 DIAGNOSIS — R07.89 OTHER CHEST PAIN: ICD-10-CM

## 2025-03-15 DIAGNOSIS — R19.7 DIARRHEA OF PRESUMED INFECTIOUS ORIGIN: ICD-10-CM

## 2025-03-15 LAB
2HR DELTA HS TROPONIN: 0 NG/L
ALBUMIN SERPL BCG-MCNC: 4.4 G/DL (ref 3.5–5)
ALP SERPL-CCNC: 72 U/L (ref 34–104)
ALT SERPL W P-5'-P-CCNC: 14 U/L (ref 7–52)
ANION GAP SERPL CALCULATED.3IONS-SCNC: 11 MMOL/L (ref 4–13)
AST SERPL W P-5'-P-CCNC: 30 U/L (ref 13–39)
BASOPHILS # BLD AUTO: 0.02 THOUSANDS/ÂΜL (ref 0–0.1)
BASOPHILS NFR BLD AUTO: 0 % (ref 0–1)
BILIRUB SERPL-MCNC: 0.6 MG/DL (ref 0.2–1)
BUN SERPL-MCNC: 14 MG/DL (ref 5–25)
CALCIUM SERPL-MCNC: 9.5 MG/DL (ref 8.4–10.2)
CARDIAC TROPONIN I PNL SERPL HS: 7 NG/L (ref ?–50)
CARDIAC TROPONIN I PNL SERPL HS: 7 NG/L (ref ?–50)
CHLORIDE SERPL-SCNC: 100 MMOL/L (ref 96–108)
CO2 SERPL-SCNC: 30 MMOL/L (ref 21–32)
CREAT SERPL-MCNC: 0.92 MG/DL (ref 0.6–1.3)
EOSINOPHIL # BLD AUTO: 0.03 THOUSAND/ÂΜL (ref 0–0.61)
EOSINOPHIL NFR BLD AUTO: 0 % (ref 0–6)
ERYTHROCYTE [DISTWIDTH] IN BLOOD BY AUTOMATED COUNT: 12.5 % (ref 11.6–15.1)
GFR SERPL CREATININE-BSD FRML MDRD: 91 ML/MIN/1.73SQ M
GLUCOSE SERPL-MCNC: 120 MG/DL (ref 65–140)
GLUCOSE SERPL-MCNC: 128 MG/DL (ref 65–140)
GLUCOSE SERPL-MCNC: 250 MG/DL (ref 65–140)
HCT VFR BLD AUTO: 38.8 % (ref 36.5–49.3)
HGB BLD-MCNC: 13.5 G/DL (ref 12–17)
IMM GRANULOCYTES # BLD AUTO: 0.06 THOUSAND/UL (ref 0–0.2)
IMM GRANULOCYTES NFR BLD AUTO: 1 % (ref 0–2)
LIPASE SERPL-CCNC: 18 U/L (ref 11–82)
LYMPHOCYTES # BLD AUTO: 1.34 THOUSANDS/ÂΜL (ref 0.6–4.47)
LYMPHOCYTES NFR BLD AUTO: 11 % (ref 14–44)
MCH RBC QN AUTO: 32.1 PG (ref 26.8–34.3)
MCHC RBC AUTO-ENTMCNC: 34.8 G/DL (ref 31.4–37.4)
MCV RBC AUTO: 92 FL (ref 82–98)
MONOCYTES # BLD AUTO: 0.71 THOUSAND/ÂΜL (ref 0.17–1.22)
MONOCYTES NFR BLD AUTO: 6 % (ref 4–12)
NEUTROPHILS # BLD AUTO: 9.77 THOUSANDS/ÂΜL (ref 1.85–7.62)
NEUTS SEG NFR BLD AUTO: 82 % (ref 43–75)
NRBC BLD AUTO-RTO: 0 /100 WBCS
PLATELET # BLD AUTO: 225 THOUSANDS/UL (ref 149–390)
PMV BLD AUTO: 10.2 FL (ref 8.9–12.7)
POTASSIUM SERPL-SCNC: 3.5 MMOL/L (ref 3.5–5.3)
PROT SERPL-MCNC: 7.9 G/DL (ref 6.4–8.4)
RBC # BLD AUTO: 4.2 MILLION/UL (ref 3.88–5.62)
SODIUM SERPL-SCNC: 141 MMOL/L (ref 135–147)
WBC # BLD AUTO: 11.93 THOUSAND/UL (ref 4.31–10.16)

## 2025-03-15 PROCEDURE — 96374 THER/PROPH/DIAG INJ IV PUSH: CPT

## 2025-03-15 PROCEDURE — 83036 HEMOGLOBIN GLYCOSYLATED A1C: CPT | Performed by: INTERNAL MEDICINE

## 2025-03-15 PROCEDURE — 96375 TX/PRO/DX INJ NEW DRUG ADDON: CPT

## 2025-03-15 PROCEDURE — 99285 EMERGENCY DEPT VISIT HI MDM: CPT | Performed by: EMERGENCY MEDICINE

## 2025-03-15 PROCEDURE — 83690 ASSAY OF LIPASE: CPT | Performed by: EMERGENCY MEDICINE

## 2025-03-15 PROCEDURE — 96376 TX/PRO/DX INJ SAME DRUG ADON: CPT

## 2025-03-15 PROCEDURE — 99285 EMERGENCY DEPT VISIT HI MDM: CPT

## 2025-03-15 PROCEDURE — 80053 COMPREHEN METABOLIC PANEL: CPT

## 2025-03-15 PROCEDURE — 82948 REAGENT STRIP/BLOOD GLUCOSE: CPT

## 2025-03-15 PROCEDURE — 71275 CT ANGIOGRAPHY CHEST: CPT

## 2025-03-15 PROCEDURE — 96361 HYDRATE IV INFUSION ADD-ON: CPT

## 2025-03-15 PROCEDURE — 36415 COLL VENOUS BLD VENIPUNCTURE: CPT

## 2025-03-15 PROCEDURE — 74174 CTA ABD&PLVS W/CONTRAST: CPT

## 2025-03-15 PROCEDURE — 85025 COMPLETE CBC W/AUTO DIFF WBC: CPT

## 2025-03-15 PROCEDURE — 93005 ELECTROCARDIOGRAM TRACING: CPT

## 2025-03-15 PROCEDURE — 71045 X-RAY EXAM CHEST 1 VIEW: CPT

## 2025-03-15 PROCEDURE — 84484 ASSAY OF TROPONIN QUANT: CPT

## 2025-03-15 PROCEDURE — 99223 1ST HOSP IP/OBS HIGH 75: CPT | Performed by: INTERNAL MEDICINE

## 2025-03-15 RX ORDER — HYDROMORPHONE HCL/PF 1 MG/ML
0.5 SYRINGE (ML) INJECTION ONCE
Status: COMPLETED | OUTPATIENT
Start: 2025-03-15 | End: 2025-03-15

## 2025-03-15 RX ORDER — INSULIN LISPRO 100 [IU]/ML
1-5 INJECTION, SOLUTION INTRAVENOUS; SUBCUTANEOUS
Status: DISCONTINUED | OUTPATIENT
Start: 2025-03-16 | End: 2025-03-19

## 2025-03-15 RX ORDER — CLOPIDOGREL BISULFATE 75 MG/1
75 TABLET ORAL DAILY
Status: DISCONTINUED | OUTPATIENT
Start: 2025-03-16 | End: 2025-03-21 | Stop reason: HOSPADM

## 2025-03-15 RX ORDER — PANTOPRAZOLE SODIUM 40 MG/1
40 TABLET, DELAYED RELEASE ORAL DAILY
Status: DISCONTINUED | OUTPATIENT
Start: 2025-03-16 | End: 2025-03-16

## 2025-03-15 RX ORDER — ONDANSETRON 2 MG/ML
4 INJECTION INTRAMUSCULAR; INTRAVENOUS EVERY 6 HOURS PRN
Status: DISCONTINUED | OUTPATIENT
Start: 2025-03-15 | End: 2025-03-20

## 2025-03-15 RX ORDER — ONDANSETRON 2 MG/ML
4 INJECTION INTRAMUSCULAR; INTRAVENOUS ONCE
Status: COMPLETED | OUTPATIENT
Start: 2025-03-15 | End: 2025-03-15

## 2025-03-15 RX ORDER — LEVOTHYROXINE SODIUM 112 UG/1
112 TABLET ORAL
Status: DISCONTINUED | OUTPATIENT
Start: 2025-03-16 | End: 2025-03-21 | Stop reason: HOSPADM

## 2025-03-15 RX ORDER — HYDROMORPHONE HCL/PF 1 MG/ML
1 SYRINGE (ML) INJECTION EVERY 4 HOURS PRN
Status: DISCONTINUED | OUTPATIENT
Start: 2025-03-15 | End: 2025-03-16

## 2025-03-15 RX ORDER — FAMOTIDINE 20 MG/1
20 TABLET, FILM COATED ORAL 2 TIMES DAILY
Status: DISCONTINUED | OUTPATIENT
Start: 2025-03-16 | End: 2025-03-21 | Stop reason: HOSPADM

## 2025-03-15 RX ORDER — LISINOPRIL 2.5 MG/1
2.5 TABLET ORAL DAILY
Status: DISCONTINUED | OUTPATIENT
Start: 2025-03-16 | End: 2025-03-21 | Stop reason: HOSPADM

## 2025-03-15 RX ORDER — ACETAMINOPHEN 325 MG/1
650 TABLET ORAL EVERY 6 HOURS PRN
Status: DISCONTINUED | OUTPATIENT
Start: 2025-03-15 | End: 2025-03-21 | Stop reason: HOSPADM

## 2025-03-15 RX ORDER — INSULIN LISPRO 100 [IU]/ML
1-5 INJECTION, SOLUTION INTRAVENOUS; SUBCUTANEOUS
Status: DISCONTINUED | OUTPATIENT
Start: 2025-03-15 | End: 2025-03-19

## 2025-03-15 RX ORDER — ASPIRIN 81 MG/1
81 TABLET, CHEWABLE ORAL DAILY
Status: DISCONTINUED | OUTPATIENT
Start: 2025-03-16 | End: 2025-03-21 | Stop reason: HOSPADM

## 2025-03-15 RX ORDER — METRONIDAZOLE 500 MG/100ML
500 INJECTION, SOLUTION INTRAVENOUS EVERY 8 HOURS
Status: DISCONTINUED | OUTPATIENT
Start: 2025-03-15 | End: 2025-03-17

## 2025-03-15 RX ORDER — SODIUM CHLORIDE 9 MG/ML
75 INJECTION, SOLUTION INTRAVENOUS CONTINUOUS
Status: DISCONTINUED | OUTPATIENT
Start: 2025-03-15 | End: 2025-03-18

## 2025-03-15 RX ORDER — HEPARIN SODIUM 5000 [USP'U]/ML
5000 INJECTION, SOLUTION INTRAVENOUS; SUBCUTANEOUS EVERY 8 HOURS SCHEDULED
Status: DISCONTINUED | OUTPATIENT
Start: 2025-03-15 | End: 2025-03-21 | Stop reason: HOSPADM

## 2025-03-15 RX ADMIN — HYDROMORPHONE HYDROCHLORIDE 1 MG: 1 INJECTION, SOLUTION INTRAMUSCULAR; INTRAVENOUS; SUBCUTANEOUS at 22:54

## 2025-03-15 RX ADMIN — HYDROMORPHONE HYDROCHLORIDE 0.5 MG: 1 INJECTION, SOLUTION INTRAMUSCULAR; INTRAVENOUS; SUBCUTANEOUS at 19:03

## 2025-03-15 RX ADMIN — ONDANSETRON 4 MG: 2 INJECTION, SOLUTION INTRAMUSCULAR; INTRAVENOUS at 16:37

## 2025-03-15 RX ADMIN — CEFTRIAXONE 1000 MG: 10 INJECTION, POWDER, FOR SOLUTION INTRAVENOUS at 19:05

## 2025-03-15 RX ADMIN — METRONIDAZOLE 500 MG: 500 INJECTION, SOLUTION INTRAVENOUS at 19:37

## 2025-03-15 RX ADMIN — IOHEXOL 85 ML: 350 INJECTION, SOLUTION INTRAVENOUS at 17:08

## 2025-03-15 RX ADMIN — HYDROMORPHONE HYDROCHLORIDE 0.5 MG: 1 INJECTION, SOLUTION INTRAMUSCULAR; INTRAVENOUS; SUBCUTANEOUS at 16:38

## 2025-03-15 RX ADMIN — SODIUM CHLORIDE 75 ML/HR: 0.9 INJECTION, SOLUTION INTRAVENOUS at 19:43

## 2025-03-15 RX ADMIN — ONDANSETRON 4 MG: 2 INJECTION, SOLUTION INTRAMUSCULAR; INTRAVENOUS at 19:02

## 2025-03-15 RX ADMIN — SODIUM CHLORIDE 1000 ML: 0.9 INJECTION, SOLUTION INTRAVENOUS at 16:37

## 2025-03-15 RX ADMIN — HEPARIN SODIUM 5000 UNITS: 5000 INJECTION INTRAVENOUS; SUBCUTANEOUS at 22:53

## 2025-03-15 NOTE — Clinical Note
Case was discussed with  and the patient's admission status was agreed to be Admission Status: observation status to the service of Dr. MENDOZA .

## 2025-03-15 NOTE — ED PROVIDER NOTES
Time reflects when diagnosis was documented in both MDM as applicable and the Disposition within this note       Time User Action Codes Description Comment    3/15/2025  6:55 PM Efren, Sushantm Add [R10.9] Abdominal pain     3/15/2025  6:55 PM Efren, Mosam Add [R07.9] Chest pain     3/15/2025  6:55 PM Efren, Mosam Add [R11.0] Nausea     3/15/2025  6:56 PM Efren, Mosam Add [K52.9] Colitis           ED Disposition       ED Disposition   Admit    Condition   Stable    Date/Time   Sat Mar 15, 2025  6:55 PM    Comment   Case was discussed with Dr. Aj and the patient's admission status was agreed to be observation status to the service of Dr. Aj.               Assessment & Plan       Medical Decision Making  58 YO M PMHX CAD s/p 3 stents, T1DM, hypothyroidism, presenting with abdominal pain and chest pain x1 day, as well as N/V, diarrhea. Uncertain about any blood in stools or emesis. While in x-ray pt was noted to have a passing out episode lasting a few seconds. At time of eval, he is laying in bed, awake.   On physical exam, pt does have diffuse abdominal tenderness on palpation however NBS, no rigidity/guarding. Appears uncomfortable and in acute pain.   Differential includes but is not limited to: gastroparesis, cannabinoid hyperemesis syndrome, colitis, pancreatitis, aortic dissection.   EKG showing sinus bradycardia which could be 2/2 vagal response.   CTA with findings concerning for colitis. Lipase wnl. CBC w leucocytosis w neutrophilia. BMP wnl.   - Symptomatic management with 1x dose of 0.5 mg IV Dilaudid, 4 mg IV Zofran, 1L NS bolus.  - Given imaging findings, initiate empiric ABX for colitis with IV Flagyl 500 mg q8h and IV Rocephin 1g. Continued symptomatic management with 1x dose of 0.5 mg IV Dilaudid and 4 mg IV Zofran.   - Per discussion w SLIM, will admit for management of colitis and pain.     Amount and/or Complexity of Data Reviewed  External Data Reviewed: ECG.  Labs: ordered. Decision-making details  documented in ED Course.  Radiology: ordered. Decision-making details documented in ED Course.  ECG/medicine tests: ordered. Decision-making details documented in ED Course.    Risk  Prescription drug management.  Decision regarding hospitalization.           Medications   heparin (porcine) subcutaneous injection 5,000 Units (5,000 Units Subcutaneous Given 3/15/25 2253)   ondansetron (ZOFRAN) injection 4 mg (4 mg Intravenous Given 3/16/25 0312)   acetaminophen (TYLENOL) tablet 650 mg (650 mg Oral Given 3/16/25 0312)   sodium chloride 0.9 % infusion (75 mL/hr Intravenous New Bag 3/15/25 1943)   insulin lispro (HumALOG/ADMELOG) 100 units/mL subcutaneous injection 1-5 Units (has no administration in time range)   insulin lispro (HumALOG/ADMELOG) 100 units/mL subcutaneous injection 1-5 Units (0 Units Subcutaneous Not Given 3/15/25 2253)   aspirin chewable tablet 81 mg (has no administration in time range)   clopidogrel (PLAVIX) tablet 75 mg (has no administration in time range)   famotidine (PEPCID) tablet 20 mg (has no administration in time range)   levothyroxine tablet 112 mcg (has no administration in time range)   lisinopril (ZESTRIL) tablet 2.5 mg (has no administration in time range)   umeclidinium 62.5 mcg/actuation inhaler AEPB 1 puff (has no administration in time range)   sodium chloride 0.9 % bolus 1,000 mL (0 mL Intravenous Stopped 3/15/25 1942)   ondansetron (ZOFRAN) injection 4 mg (4 mg Intravenous Given 3/15/25 1637)   HYDROmorphone (DILAUDID) injection 0.5 mg (0.5 mg Intravenous Given 3/15/25 1638)   iohexol (OMNIPAQUE) 350 MG/ML injection (MULTI-DOSE) 85 mL (85 mL Intravenous Given 3/15/25 1708)   ceftriaxone (ROCEPHIN) 1 g/50 mL in dextrose IVPB (0 mg Intravenous Stopped 3/15/25 1932)   HYDROmorphone (DILAUDID) injection 0.5 mg (0.5 mg Intravenous Given 3/15/25 1903)   ondansetron (ZOFRAN) injection 4 mg (4 mg Intravenous Given 3/15/25 1902)       ED Risk Strat Scores   HEART Risk Score      Flowsheet  Row Most Recent Value   Heart Score Risk Calculator    History 1 Filed at: 03/15/2025 1721   ECG 0 Filed at: 03/15/2025 1721   Age 1 Filed at: 03/15/2025 1721   Risk Factors 2 Filed at: 03/15/2025 1721   Troponin 0 Filed at: 03/15/2025 1721   HEART Score 4 Filed at: 03/15/2025 1721          HEART Risk Score      Flowsheet Row Most Recent Value   Heart Score Risk Calculator    History 1 Filed at: 03/15/2025 1721   ECG 0 Filed at: 03/15/2025 1721   Age 1 Filed at: 03/15/2025 1721   Risk Factors 2 Filed at: 03/15/2025 1721   Troponin 0 Filed at: 03/15/2025 1721   HEART Score 4 Filed at: 03/15/2025 1721                              SBIRT 22yo+      Flowsheet Row Most Recent Value   Initial Alcohol Screen: US AUDIT-C     1. How often do you have a drink containing alcohol? 0 Filed at: 03/15/2025 1642   2. How many drinks containing alcohol do you have on a typical day you are drinking?  0 Filed at: 03/15/2025 1642   3a. Male UNDER 65: How often do you have five or more drinks on one occasion? 0 Filed at: 03/15/2025 1642   3b. FEMALE Any Age, or MALE 65+: How often do you have 4 or more drinks on one occassion? 0 Filed at: 03/15/2025 1642   Audit-C Score 0 Filed at: 03/15/2025 1642   CRISPIN: How many times in the past year have you...    Used an illegal drug or used a prescription medication for non-medical reasons? Daily or Almost Daily Filed at: 03/15/2025 1642                            History of Present Illness       Chief Complaint   Patient presents with    Chest Pain     Pt reports CP/abd pain that started yesterday. Reports he is also diabetic. Cardiac hx significant for 4 stents. +diarrhea, +vomiting    Abdominal Pain       Past Medical History:   Diagnosis Date    Coronary artery disease     3 stents     Diabetes mellitus (HCC)       Past Surgical History:   Procedure Laterality Date    KNEE SURGERY        History reviewed. No pertinent family history.   Social History     Tobacco Use    Smoking status: Never     Smokeless tobacco: Never   Vaping Use    Vaping status: Never Used   Substance Use Topics    Alcohol use: Not Currently    Drug use: Yes     Types: Marijuana     Comment: last use 2 days ago      E-Cigarette/Vaping    E-Cigarette Use Never User       E-Cigarette/Vaping Substances    Nicotine No     THC Yes     CBD No       I have reviewed and agree with the history as documented.     60 YO M PMHX CAD s/p 3 stents, T1DM, hypothyroidism, presenting with abdominal pain and chest pain x1 day. He has also had nausea, vomiting, and diarrhea. He is unsure if there has been any blood in his emesis or stool. Denies any fever. Of note, while in x-ray pt was noted to have a passing out episode lasting a few seconds. However pt was able to walk to bathroom independently per RN. Pt was reevaluated after imaging. He is laying in bed, groaning in pain. He denies any recent alcohol use, reports smoking cigarettes in the past. He does endorse smoking cannabis every day, last use being yesterday evening as an attempt to alleviate the abdominal pain. Denies any dyspnea, fevers/chills, headaches.         Review of Systems   Constitutional:  Negative for chills and fever.   HENT:  Negative for ear pain and sore throat.    Eyes:  Negative for pain and visual disturbance.   Respiratory:  Negative for cough and shortness of breath.    Cardiovascular:  Positive for chest pain. Negative for palpitations.   Gastrointestinal:  Positive for abdominal pain, diarrhea, nausea and vomiting.   Genitourinary:  Negative for dysuria and hematuria.   Musculoskeletal:  Negative for arthralgias and back pain.   Skin:  Negative for color change and rash.   Neurological:  Negative for seizures and syncope.   All other systems reviewed and are negative.          Objective       ED Triage Vitals   Temperature Pulse Blood Pressure Respirations SpO2 Patient Position - Orthostatic VS   03/15/25 1717 03/15/25 1545 03/15/25 1545 03/15/25 1545 03/15/25 1549  03/15/25 1545   98.5 °F (36.9 °C) (!) 52 164/72 22 100 % Lying      Temp Source Heart Rate Source BP Location FiO2 (%) Pain Score    03/15/25 1717 03/15/25 1545 03/15/25 1545 -- 03/15/25 1638    Oral Monitor Left arm  10 - Worst Possible Pain      Vitals      Date and Time Temp Pulse SpO2 Resp BP Pain Score FACES Pain Rating User   03/17/25 0901 -- -- -- -- -- 8 -- PA   03/17/25 0815 -- -- -- -- -- 6 -- KK   03/17/25 0719 97.9 °F (36.6 °C) 64 96 % -- 139/69 -- -- DII   03/17/25 0427 -- -- -- -- -- 8 --    03/17/25 0046 98.5 °F (36.9 °C) 54 96 % 16 121/60 -- -- DII   03/17/25 0042 -- -- -- -- -- 8 -- BJ   03/16/25 2132 98.1 °F (36.7 °C) 60 97 % -- 117/53 -- -- DII   03/16/25 2100 -- -- -- -- -- 6 -- BJ   03/16/25 2017 -- -- -- -- -- 8 --    03/16/25 1442 -- -- -- -- -- 10 - Worst Possible Pain -- TC   03/16/25 1428 97.9 °F (36.6 °C) 58 97 % -- 99/46 -- -- DII   03/16/25 1300 -- -- -- -- -- No Pain -- AR   03/16/25 0948 -- -- -- -- -- 8 -- EB   03/16/25 0853 -- 53 97 % -- 109/53 -- -- DII   03/16/25 0852 -- 57 96 % -- 105/51 -- -- DII   03/16/25 0721 -- 60 96 % -- 110/60 -- -- DII   03/16/25 0702 98.3 °F (36.8 °C) 62 97 % -- 97/50 -- -- DII   03/16/25 0523 -- -- -- -- -- 8 -- BW   03/16/25 0430 98.5 °F (36.9 °C) 60 96 % -- 109/55 -- -- DII   03/16/25 0430 -- -- -- -- -- 7 -- LP   03/16/25 0349 98.2 °F (36.8 °C) 59 96 % 18 106/59 5 -- JA   03/16/25 0312 -- -- -- -- -- 7 -- JA   03/16/25 0311 -- 64 97 % -- 108/53 -- -- JA   03/16/25 0236 -- 66 97 % 18 116/56 7 -- C(S   03/16/25 0031 -- -- -- -- -- 4 -- JA   03/16/25 0028 -- 56 96 % 18 106/58 -- -- JA   03/15/25 2254 -- -- -- -- -- 8 -- VB   03/15/25 1903 -- -- -- -- -- 10 - Worst Possible Pain -- VB   03/15/25 1730 -- 48 97 % -- 135/63 -- -- VB   03/15/25 1717 98.5 °F (36.9 °C) -- -- -- -- -- -- RL   03/15/25 1715 -- 49 99 % 18 156/69 -- -- VB   03/15/25 1638 -- -- -- -- -- 10 - Worst Possible Pain -- VB   03/15/25 1630 -- 56 100 % 18 -- -- -- VB   03/15/25 1549 --  -- 100 % -- -- -- -- KD   03/15/25 1545 -- 52 -- 22 164/72 -- -- KD            Physical Exam  Vitals reviewed.   Constitutional:       Appearance: He is ill-appearing.   HENT:      Head: Normocephalic and atraumatic.   Eyes:      Conjunctiva/sclera: Conjunctivae normal.   Cardiovascular:      Rate and Rhythm: Regular rhythm. Bradycardia present.      Heart sounds: No murmur heard.  Pulmonary:      Effort: Pulmonary effort is normal. No respiratory distress.      Breath sounds: Normal breath sounds.   Chest:      Chest wall: No tenderness.   Abdominal:      Palpations: Abdomen is soft.      Tenderness: There is generalized abdominal tenderness.   Musculoskeletal:         General: No swelling.      Cervical back: Neck supple.   Skin:     General: Skin is warm and dry.      Capillary Refill: Capillary refill takes less than 2 seconds.   Neurological:      Mental Status: He is alert.   Psychiatric:         Mood and Affect: Mood normal.         Results Reviewed       Procedure Component Value Units Date/Time    Clostridioides difficile toxin by PCR with EIA [[id number]] Collected: 03/17/25 0855    Lab Status: In process Specimen: Stool from Per Rectum Updated: 03/17/25 0908    Basic metabolic panel [[id number]] Collected: 03/16/25 1018    Lab Status: Final result Specimen: Blood from Arm, Left Updated: 03/16/25 1105     Sodium 140 mmol/L      Potassium 3.5 mmol/L      Chloride 103 mmol/L      CO2 30 mmol/L      ANION GAP 7 mmol/L      BUN 16 mg/dL      Creatinine 1.04 mg/dL      Glucose 73 mg/dL      Calcium 8.6 mg/dL      eGFR 79 ml/min/1.73sq m     Narrative:      National Kid[employer] Foundation guidelines for Chronic Kidney Disease (CKD):     Stage 1 with normal or high GFR (GFR > 90 mL/min/1.73 square meters)    Stage 2 Mild CKD (GFR = 60-89 mL/min/1.73 square meters)    Stage 3A Moderate CKD (GFR = 45-59 mL/min/1.73 square meters)    Stage 3B Moderate CKD (GFR = 30-44 mL/min/1.73 square meters)    Stage 4 Severe  CKD (GFR = 15-29 mL/min/1.73 square meters)    Stage 5 End Stage CKD (GFR <15 mL/min/1.73 square meters)  Note: GFR calculation is accurate only with a steady state creatinine    CBC and differential [[id number]] Collected: 03/16/25 1018    Lab Status: Final result Specimen: Blood from Arm, Left Updated: 03/16/25 1029     WBC 9.52 Thousand/uL      RBC 3.97 Million/uL      Hemoglobin 12.7 g/dL      Hematocrit 37.8 %      MCV 95 fL      MCH 32.0 pg      MCHC 33.6 g/dL      RDW 12.8 %      MPV 10.4 fL      Platelets 194 Thousands/uL      nRBC 0 /100 WBCs      Segmented % 72 %      Immature Grans % 0 %      Lymphocytes % 20 %      Monocytes % 8 %      Eosinophils Relative 0 %      Basophils Relative 0 %      Absolute Neutrophils 6.73 Thousands/µL      Absolute Immature Grans 0.02 Thousand/uL      Absolute Lymphocytes 1.94 Thousands/µL      Absolute Monocytes 0.80 Thousand/µL      Eosinophils Absolute 0.01 Thousand/µL      Basophils Absolute 0.02 Thousands/µL     Hemoglobin A1c w/EAG Estimation (Prechecked if no A1C within 90 days) [[id number]]  (Abnormal) Collected: 03/15/25 1942    Lab Status: Final result Specimen: Blood from Arm, Right Updated: 03/16/25 0513     Hemoglobin A1C 8.2 %       mg/dl     Fingerstick Glucose (POCT) [[id number]]  (Abnormal) Collected: 03/15/25 2247    Lab Status: Final result Specimen: Blood Updated: 03/15/25 2247     POC Glucose 250 mg/dl     HS Troponin I 2hr [[id number]]  (Normal) Collected: 03/15/25 1942    Lab Status: Final result Specimen: Blood from Arm, Right Updated: 03/15/25 2024     hs TnI 2hr 7 ng/L      Delta 2hr hsTnI 0 ng/L     Stool Enteric Bacterial Panel by PCR [[id number]]     Lab Status: No result Specimen: Stool     Lipase [[id number]]  (Normal) Collected: 03/15/25 1555    Lab Status: Final result Specimen: Blood from Arm, Right Updated: 03/15/25 1712     Lipase 18 u/L     Comprehensive metabolic panel [[id number]] Collected: 03/15/25 1555    Lab Status: Final result  Specimen: Blood from Arm, Right Updated: 03/15/25 1647     Sodium 141 mmol/L      Potassium 3.5 mmol/L      Chloride 100 mmol/L      CO2 30 mmol/L      ANION GAP 11 mmol/L      BUN 14 mg/dL      Creatinine 0.92 mg/dL      Glucose 120 mg/dL      Calcium 9.5 mg/dL      AST 30 U/L      ALT 14 U/L      Alkaline Phosphatase 72 U/L      Total Protein 7.9 g/dL      Albumin 4.4 g/dL      Total Bilirubin 0.60 mg/dL      eGFR 91 ml/min/1.73sq m     Narrative:      National Kidney Disease Foundation guidelines for Chronic Kidney Disease (CKD):     Stage 1 with normal or high GFR (GFR > 90 mL/min/1.73 square meters)    Stage 2 Mild CKD (GFR = 60-89 mL/min/1.73 square meters)    Stage 3A Moderate CKD (GFR = 45-59 mL/min/1.73 square meters)    Stage 3B Moderate CKD (GFR = 30-44 mL/min/1.73 square meters)    Stage 4 Severe CKD (GFR = 15-29 mL/min/1.73 square meters)    Stage 5 End Stage CKD (GFR <15 mL/min/1.73 square meters)  Note: GFR calculation is accurate only with a steady state creatinine    HS Troponin 0hr (reflex protocol) [[id number]]  (Normal) Collected: 03/15/25 1555    Lab Status: Final result Specimen: Blood from Arm, Right Updated: 03/15/25 1634     hs TnI 0hr 7 ng/L     CBC and differential [[id number]]  (Abnormal) Collected: 03/15/25 1555    Lab Status: Final result Specimen: Blood from Arm, Right Updated: 03/15/25 1614     WBC 11.93 Thousand/uL      RBC 4.20 Million/uL      Hemoglobin 13.5 g/dL      Hematocrit 38.8 %      MCV 92 fL      MCH 32.1 pg      MCHC 34.8 g/dL      RDW 12.5 %      MPV 10.2 fL      Platelets 225 Thousands/uL      nRBC 0 /100 WBCs      Segmented % 82 %      Immature Grans % 1 %      Lymphocytes % 11 %      Monocytes % 6 %      Eosinophils Relative 0 %      Basophils Relative 0 %      Absolute Neutrophils 9.77 Thousands/µL      Absolute Immature Grans 0.06 Thousand/uL      Absolute Lymphocytes 1.34 Thousands/µL      Absolute Monocytes 0.71 Thousand/µL      Eosinophils Absolute 0.03 Thousand/µL       Basophils Absolute 0.02 Thousands/µL     Fingerstick Glucose (POCT) [[id number]]  (Normal) Collected: 03/15/25 1544    Lab Status: Final result Specimen: Blood Updated: 03/15/25 1545     POC Glucose 128 mg/dl             CTA dissection protocol chest abdomen pelvis w wo contrast   Final Interpretation by Clark Hall MD (03/15 1825)      No evidence of aneurysm or dissection.      Atherosclerotic changes in the aorta.      Small hiatus hernia.      Apparent thickening of the colon may be related to under distention versus mild inflammation/colitis. There is slight haziness of the pericolonic fat. This should be correlated with any GI symptomatology.            The study was marked in EPIC for immediate notification.               Workstation performed: [id number]         XR chest 1 view   Final Interpretation by Shirley Watson MD (03/16 0907)      No acute cardiopulmonary disease.            Workstation performed: [id number]             ECG 12 Lead Documentation Only    Date/Time: 3/15/2025 4:16 PM    Performed by: Corine Schwartz DO  Authorized by: Corine Schwartz DO    Indications / Diagnosis:  Chest pain  ECG reviewed by me, the ED Provider: yes    Patient location:  ED  Previous ECG:     Previous ECG:  Compared to current    Comparison ECG info:  09/16/24    Comparison to previous ECG: Sinus bradycardia, new.  Interpretation:     Interpretation: normal    Rate:     ECG rate:  48    ECG rate assessment: bradycardic    Rhythm:     Rhythm: sinus bradycardia    QRS:     QRS axis:  Left  ST segments:     ST segments:  Normal  T waves:     T waves: normal        ED Medication and Procedure Management   Prior to Admission Medications   Prescriptions Last Dose Informant Patient Reported? Taking?   PATIENT MAINTAINED INSULIN PUMP   No No   Sig: Inject 1 each under the skin every 8 (eight) hours   aluminum-magnesium hydroxide-simethicone (MAALOX MAX) 400-400-40 MG/5ML suspension   No No   Sig: Take 5 mL by mouth every  6 (six) hours as needed for indigestion or heartburn   aspirin 81 mg chewable tablet  Self Yes No   Sig: Chew 81 mg daily   busPIRone (BUSPAR) 15 mg tablet   Yes No   Sig: Take 15 mg by mouth 2 (two) times a day   clopidogrel (PLAVIX) 75 mg tablet   Yes No   Sig: Take 75 mg by mouth daily   famotidine (PEPCID) 20 mg tablet   No No   Sig: Take 1 tablet (20 mg total) by mouth 2 (two) times a day   hydrOXYzine HCL (ATARAX) 25 mg tablet   Yes No   Sig: Take 25 mg by mouth every 6 (six) hours as needed   levothyroxine 112 mcg tablet   No No   Sig: Take 1 tablet (112 mcg total) by mouth daily in the early morning   lisinopril (ZESTRIL) 2.5 mg tablet   Yes No   Sig: Take 2.5 mg by mouth daily   methocarbamol (ROBAXIN) 500 mg tablet   No No   Sig: Take 1 tablet (500 mg total) by mouth 2 (two) times a day   ondansetron (ZOFRAN-ODT) 4 mg disintegrating tablet   No No   Sig: Take 1 tablet (4 mg total) by mouth every 8 (eight) hours as needed for vomiting or nausea for up to 7 days   oxyCODONE (ROXICODONE) 15 mg immediate release tablet   Yes No   Sig: Take 15 mg by mouth every 6 (six) hours as needed for moderate pain or severe pain (For knee pain)   pantoprazole (PROTONIX) 40 mg tablet   No No   Sig: Take 1 tablet (40 mg total) by mouth daily for 14 days   umeclidinium bromide (Incruse Ellipta) 62.5 mcg/inh AEPB inhaler   Yes No   Sig: Inhale 1 Inhaler daily      Facility-Administered Medications: None     Current Discharge Medication List        CONTINUE these medications which have NOT CHANGED    Details   aluminum-magnesium hydroxide-simethicone (MAALOX MAX) 400-400-40 MG/5ML suspension Take 5 mL by mouth every 6 (six) hours as needed for indigestion or heartburn  Qty: 355 mL, Refills: 0    Associated Diagnoses: Abdominal pain      aspirin 81 mg chewable tablet Chew 81 mg daily      busPIRone (BUSPAR) 15 mg tablet Take 15 mg by mouth 2 (two) times a day      clopidogrel (PLAVIX) 75 mg tablet Take 75 mg by mouth daily       famotidine (PEPCID) 20 mg tablet Take 1 tablet (20 mg total) by mouth 2 (two) times a day  Qty: 60 tablet, Refills: 0    Associated Diagnoses: Abdominal pain      hydrOXYzine HCL (ATARAX) 25 mg tablet Take 25 mg by mouth every 6 (six) hours as needed      levothyroxine 112 mcg tablet Take 1 tablet (112 mcg total) by mouth daily in the early morning    Associated Diagnoses: Hypothyroidism      lisinopril (ZESTRIL) 2.5 mg tablet Take 2.5 mg by mouth daily      methocarbamol (ROBAXIN) 500 mg tablet Take 1 tablet (500 mg total) by mouth 2 (two) times a day  Qty: 20 tablet, Refills: 0    Associated Diagnoses: Shoulder pain      ondansetron (ZOFRAN-ODT) 4 mg disintegrating tablet Take 1 tablet (4 mg total) by mouth every 8 (eight) hours as needed for vomiting or nausea for up to 7 days  Qty: 20 tablet, Refills: 0    Associated Diagnoses: Nausea & vomiting      oxyCODONE (ROXICODONE) 15 mg immediate release tablet Take 15 mg by mouth every 6 (six) hours as needed for moderate pain or severe pain (For knee pain)      pantoprazole (PROTONIX) 40 mg tablet Take 1 tablet (40 mg total) by mouth daily for 14 days  Qty: 14 tablet, Refills: 0    Associated Diagnoses: Gastritis      PATIENT MAINTAINED INSULIN PUMP Inject 1 each under the skin every 8 (eight) hours  Refills: 0    Associated Diagnoses: Type 1 diabetes mellitus (HCC)      umeclidinium bromide (Incruse Ellipta) 62.5 mcg/inh AEPB inhaler Inhale 1 Inhaler daily           No discharge procedures on file.  ED SEPSIS DOCUMENTATION   Time reflects when diagnosis was documented in both MDM as applicable and the Disposition within this note       Time User Action Codes Description Comment    3/15/2025  6:55 PM Efren, Mosam Add [R10.9] Abdominal pain     3/15/2025  6:55 PM Efren, Mosam Add [R07.9] Chest pain     3/15/2025  6:55 PM Efren, Mosam Add [R11.0] Nausea     3/15/2025  6:56 PM Efren, Mosam Add [K52.9] Colitis                  Corine Schwartz, DO  03/16/25 0022       Corine Schwartz,  DO  03/17/25 1337

## 2025-03-15 NOTE — H&P
H&P - Hospitalist   Name: Brian Butterfield 59 y.o. male I MRN: [id number]  Unit/Bed#: ED-37 I Date of Admission: 3/15/2025   Date of Service: 3/15/2025 I Hospital Day: 0     Assessment & Plan  Intractable abdominal pain  59-year-old male with history of type 1 diabetes, coronary artery disease who initially presented with significant generalized abdominal pain and inability to tolerate p.o. intake  Suspect likely secondary to   possible colitis as noted on CT scan to have mild colitis presented with 1 day of diarrhea  Will admit for pain control  Advance diet as able  Zofran as needed  Noted to have mild leukocytosis likely reactive  Will check stool bacterial panel and c diff  Will continue to monitor  COPD (chronic obstructive pulmonary disease) (HCC)  Not in acute exacerbation  Continue home inhalers  CAD S/P percutaneous coronary angioplasty  History of coronary artery disease status post PCI 2020 and 2021  Continue aspirin, Plavix   Diabetic polyneuropathy associated with type 1 diabetes mellitus (HCC)    Lab Results   Component Value Date    HGBA1C 8.5 (H) 10/24/2024   Continue home insulin pump      VTE Pharmacologic Prophylaxis:   Moderate Risk (Score 3-4) - Pharmacological DVT Prophylaxis Ordered: heparin.  Code Status: Intractable abdominal pain full code  Discussion with family: Patient declined call to .     Anticipated Length of Stay: Patient will be admitted on an observation basis with an anticipated length of stay of less than 2 midnights secondary to intractable abdominal pain.    History of Present Illness   Chief Complaint: Abdominal pain    Brian Butterfield is a 59 y.o. male with past medical history significant type 1 diabetes, coronary artery disease status post PCI initially presented with abdominal pain.  Patient reports generalized severe abdominal pain that began yesterday and was persistent.  Reports nausea and vomiting and inability to tolerate p.o. intake.  Also reports  diarrhea that began yesterday as well.  Otherwise denies any acute complaints.  Denies fevers, chills, chest pain, palpitations, cough or any other complaints    Review of Systems   Constitutional:  Negative for appetite change, chills, diaphoresis, fatigue, fever and unexpected weight change.   HENT:  Negative for congestion, rhinorrhea and sore throat.    Eyes:  Negative for photophobia and visual disturbance.   Respiratory:  Negative for cough, shortness of breath and wheezing.    Cardiovascular:  Negative for chest pain, palpitations and leg swelling.   Gastrointestinal:  Positive for abdominal pain, nausea and vomiting. Negative for anal bleeding, blood in stool, constipation and diarrhea.   Genitourinary:  Negative for decreased urine volume, difficulty urinating, dysuria, flank pain, frequency, hematuria and urgency.   Musculoskeletal:  Negative for arthralgias, back pain, joint swelling and myalgias.   Skin:  Negative for color change and rash.   Neurological:  Negative for dizziness, seizures, facial asymmetry, speech difficulty, numbness and headaches.   Psychiatric/Behavioral:  Negative for agitation, confusion and decreased concentration. The patient is not nervous/anxious.        Historical Information   Past Medical History:   Diagnosis Date    Coronary artery disease     3 stents     Diabetes mellitus (HCC)      Past Surgical History:   Procedure Laterality Date    KNEE SURGERY       Social History     Tobacco Use    Smoking status: Never    Smokeless tobacco: Never   Vaping Use    Vaping status: Never Used   Substance and Sexual Activity    Alcohol use: Not Currently    Drug use: Yes     Types: Marijuana     Comment: last use 2 days ago    Sexual activity: Not on file     E-Cigarette/Vaping    E-Cigarette Use Never User      E-Cigarette/Vaping Substances    Nicotine No     THC Yes     CBD No      History reviewed. No pertinent family history.  Social History:  Marital Status:      Patient  Pre-hospital Living Situation: Home  Patient Pre-hospital Level of Mobility: walks  Patient Pre-hospital Diet Restrictions: dm    Meds/Allergies   I have reviewed home medications with patient personally.  Prior to Admission medications    Medication Sig Start Date End Date Taking? Authorizing Provider   aluminum-magnesium hydroxide-simethicone (MAALOX MAX) 400-400-40 MG/5ML suspension Take 5 mL by mouth every 6 (six) hours as needed for indigestion or heartburn 12/31/23   Eric Early MD   aspirin 81 mg chewable tablet Chew 81 mg daily    Historical Provider, MD   busPIRone (BUSPAR) 15 mg tablet Take 15 mg by mouth 2 (two) times a day 2/8/23 2/8/24  Historical Provider, MD   clopidogrel (PLAVIX) 75 mg tablet Take 75 mg by mouth daily 12/23/20 12/23/21  Historical Provider, MD   famotidine (PEPCID) 20 mg tablet Take 1 tablet (20 mg total) by mouth 2 (two) times a day 12/31/23 1/30/24  Eric Early MD   hydrOXYzine HCL (ATARAX) 25 mg tablet Take 25 mg by mouth every 6 (six) hours as needed 11/2/23   Historical Provider, MD   levothyroxine 112 mcg tablet Take 1 tablet (112 mcg total) by mouth daily in the early morning 1/1/24   Eric Early MD   lisinopril (ZESTRIL) 2.5 mg tablet Take 2.5 mg by mouth daily 8/21/23 8/20/24  Historical Provider, MD   methocarbamol (ROBAXIN) 500 mg tablet Take 1 tablet (500 mg total) by mouth 2 (two) times a day 9/19/24   Hardeep Reed MD   ondansetron (ZOFRAN-ODT) 4 mg disintegrating tablet Take 1 tablet (4 mg total) by mouth every 8 (eight) hours as needed for vomiting or nausea for up to 7 days 7/2/24 7/9/24  Zach Zamudio MD   oxyCODONE (ROXICODONE) 15 mg immediate release tablet Take 15 mg by mouth every 6 (six) hours as needed for moderate pain or severe pain (For knee pain) 12/20/23   Historical Provider, MD   pantoprazole (PROTONIX) 40 mg tablet Take 1 tablet (40 mg total) by mouth daily for 14 days 12/31/23 1/14/24  Eric Early MD   PATIENT  MAINTAINED INSULIN PUMP Inject 1 each under the skin every 8 (eight) hours 5/6/21   Mario Landaverde MD   umeclidinium bromide (Incruse Ellipta) 62.5 mcg/inh AEPB inhaler Inhale 1 Inhaler daily 4/19/21   Historical Provider, MD     Allergies   Allergen Reactions    Shellfish-Derived Products - Food Allergy Hives       Objective :  Temp:  [98.5 °F (36.9 °C)] 98.5 °F (36.9 °C)  HR:  [48-56] 48  BP: (135-164)/(63-72) 135/63  Resp:  [18-22] 18  SpO2:  [97 %-100 %] 97 %  O2 Device: None (Room air)    Physical Exam  Constitutional:       General: He is not in acute distress.     Appearance: He is well-developed. He is not diaphoretic.   HENT:      Head: Normocephalic and atraumatic.      Nose: Nose normal.      Mouth/Throat:      Pharynx: No oropharyngeal exudate.   Eyes:      General: No scleral icterus.     Conjunctiva/sclera: Conjunctivae normal.   Cardiovascular:      Rate and Rhythm: Normal rate and regular rhythm.      Heart sounds: Normal heart sounds. No murmur heard.     No friction rub. No gallop.   Pulmonary:      Effort: Pulmonary effort is normal. No respiratory distress.      Breath sounds: Normal breath sounds. No wheezing or rales.   Chest:      Chest wall: No tenderness.   Abdominal:      General: Bowel sounds are normal. There is no distension.      Palpations: Abdomen is soft.      Tenderness: There is abdominal tenderness. There is no guarding.   Musculoskeletal:         General: No tenderness or deformity. Normal range of motion.      Cervical back: Normal range of motion and neck supple.   Skin:     General: Skin is warm and dry.      Findings: No erythema.   Neurological:      Mental Status: He is alert. Mental status is at baseline.          Lines/Drains:            Lab Results: I have reviewed the following results:  Results from last 7 days   Lab Units 03/15/25  1555   WBC Thousand/uL 11.93*   HEMOGLOBIN g/dL 13.5   HEMATOCRIT % 38.8   PLATELETS Thousands/uL 225   SEGS PCT % 82*   LYMPHO  PCT % 11*   MONO PCT % 6   EOS PCT % 0     Results from last 7 days   Lab Units 03/15/25  1555   SODIUM mmol/L 141   POTASSIUM mmol/L 3.5   CHLORIDE mmol/L 100   CO2 mmol/L 30   BUN mg/dL 14   CREATININE mg/dL 0.92   ANION GAP mmol/L 11   CALCIUM mg/dL 9.5   ALBUMIN g/dL 4.4   TOTAL BILIRUBIN mg/dL 0.60   ALK PHOS U/L 72   ALT U/L 14   AST U/L 30   GLUCOSE RANDOM mg/dL 120         Results from last 7 days   Lab Units 03/15/25  1544   POC GLUCOSE mg/dl 128     Lab Results   Component Value Date    HGBA1C 8.5 (H) 10/24/2024    HGBA1C 8.2 (H) 06/25/2024    HGBA1C 8.2 (H) 03/26/2024           Imaging Results Review: No pertinent imaging studies reviewed.  Other Study Results Review: EKG was reviewed.     Administrative Statements   I have spent a total time of 76 minutes in caring for this patient on the day of the visit/encounter including Diagnostic results.    ** Please Note: This note has been constructed using a voice recognition system. **

## 2025-03-15 NOTE — ASSESSMENT & PLAN NOTE
59-year-old male with history of type 1 diabetes, coronary artery disease who initially presented with significant generalized abdominal pain and inability to tolerate p.o. intake  Suspect likely secondary to   possible colitis as noted on CT scan to have mild colitis presented with 1 day of diarrhea  Will admit for pain control  Advance diet as able  Zofran as needed  Noted to have mild leukocytosis likely reactive  Will check stool bacterial panel and c diff  Will continue to monitor

## 2025-03-16 LAB
ANION GAP SERPL CALCULATED.3IONS-SCNC: 7 MMOL/L (ref 4–13)
ATRIAL RATE: 48 BPM
BASOPHILS # BLD AUTO: 0.02 THOUSANDS/ÂΜL (ref 0–0.1)
BASOPHILS NFR BLD AUTO: 0 % (ref 0–1)
BUN SERPL-MCNC: 16 MG/DL (ref 5–25)
CALCIUM SERPL-MCNC: 8.6 MG/DL (ref 8.4–10.2)
CHLORIDE SERPL-SCNC: 103 MMOL/L (ref 96–108)
CO2 SERPL-SCNC: 30 MMOL/L (ref 21–32)
CREAT SERPL-MCNC: 1.04 MG/DL (ref 0.6–1.3)
EOSINOPHIL # BLD AUTO: 0.01 THOUSAND/ÂΜL (ref 0–0.61)
EOSINOPHIL NFR BLD AUTO: 0 % (ref 0–6)
ERYTHROCYTE [DISTWIDTH] IN BLOOD BY AUTOMATED COUNT: 12.8 % (ref 11.6–15.1)
EST. AVERAGE GLUCOSE BLD GHB EST-MCNC: 189 MG/DL
GFR SERPL CREATININE-BSD FRML MDRD: 79 ML/MIN/1.73SQ M
GLUCOSE SERPL-MCNC: 121 MG/DL (ref 65–140)
GLUCOSE SERPL-MCNC: 157 MG/DL (ref 65–140)
GLUCOSE SERPL-MCNC: 54 MG/DL (ref 65–140)
GLUCOSE SERPL-MCNC: 69 MG/DL (ref 65–140)
GLUCOSE SERPL-MCNC: 71 MG/DL (ref 65–140)
GLUCOSE SERPL-MCNC: 72 MG/DL (ref 65–140)
GLUCOSE SERPL-MCNC: 73 MG/DL (ref 65–140)
GLUCOSE SERPL-MCNC: 83 MG/DL (ref 65–140)
HBA1C MFR BLD: 8.2 %
HCT VFR BLD AUTO: 37.8 % (ref 36.5–49.3)
HGB BLD-MCNC: 12.7 G/DL (ref 12–17)
IMM GRANULOCYTES # BLD AUTO: 0.02 THOUSAND/UL (ref 0–0.2)
IMM GRANULOCYTES NFR BLD AUTO: 0 % (ref 0–2)
LYMPHOCYTES # BLD AUTO: 1.94 THOUSANDS/ÂΜL (ref 0.6–4.47)
LYMPHOCYTES NFR BLD AUTO: 20 % (ref 14–44)
MCH RBC QN AUTO: 32 PG (ref 26.8–34.3)
MCHC RBC AUTO-ENTMCNC: 33.6 G/DL (ref 31.4–37.4)
MCV RBC AUTO: 95 FL (ref 82–98)
MONOCYTES # BLD AUTO: 0.8 THOUSAND/ÂΜL (ref 0.17–1.22)
MONOCYTES NFR BLD AUTO: 8 % (ref 4–12)
NEUTROPHILS # BLD AUTO: 6.73 THOUSANDS/ÂΜL (ref 1.85–7.62)
NEUTS SEG NFR BLD AUTO: 72 % (ref 43–75)
NRBC BLD AUTO-RTO: 0 /100 WBCS
P AXIS: 30 DEGREES
PLATELET # BLD AUTO: 194 THOUSANDS/UL (ref 149–390)
PMV BLD AUTO: 10.4 FL (ref 8.9–12.7)
POTASSIUM SERPL-SCNC: 3.5 MMOL/L (ref 3.5–5.3)
PR INTERVAL: 124 MS
QRS AXIS: -64 DEGREES
QRSD INTERVAL: 106 MS
QT INTERVAL: 466 MS
QTC INTERVAL: 416 MS
RBC # BLD AUTO: 3.97 MILLION/UL (ref 3.88–5.62)
SODIUM SERPL-SCNC: 140 MMOL/L (ref 135–147)
T WAVE AXIS: 46 DEGREES
VENTRICULAR RATE: 48 BPM
WBC # BLD AUTO: 9.52 THOUSAND/UL (ref 4.31–10.16)

## 2025-03-16 PROCEDURE — 99232 SBSQ HOSP IP/OBS MODERATE 35: CPT

## 2025-03-16 PROCEDURE — 82948 REAGENT STRIP/BLOOD GLUCOSE: CPT

## 2025-03-16 PROCEDURE — 85025 COMPLETE CBC W/AUTO DIFF WBC: CPT | Performed by: INTERNAL MEDICINE

## 2025-03-16 PROCEDURE — 93010 ELECTROCARDIOGRAM REPORT: CPT | Performed by: INTERNAL MEDICINE

## 2025-03-16 PROCEDURE — 80048 BASIC METABOLIC PNL TOTAL CA: CPT | Performed by: INTERNAL MEDICINE

## 2025-03-16 RX ORDER — HYDROMORPHONE HCL/PF 1 MG/ML
1 SYRINGE (ML) INJECTION EVERY 4 HOURS PRN
Status: DISCONTINUED | OUTPATIENT
Start: 2025-03-16 | End: 2025-03-18

## 2025-03-16 RX ORDER — HYDROMORPHONE HCL/PF 1 MG/ML
0.5 SYRINGE (ML) INJECTION EVERY 4 HOURS PRN
Refills: 0 | Status: DISCONTINUED | OUTPATIENT
Start: 2025-03-16 | End: 2025-03-18

## 2025-03-16 RX ORDER — MAGNESIUM HYDROXIDE/ALUMINUM HYDROXICE/SIMETHICONE 120; 1200; 1200 MG/30ML; MG/30ML; MG/30ML
30 SUSPENSION ORAL EVERY 4 HOURS PRN
Status: DISCONTINUED | OUTPATIENT
Start: 2025-03-16 | End: 2025-03-21 | Stop reason: HOSPADM

## 2025-03-16 RX ORDER — PANTOPRAZOLE SODIUM 40 MG/10ML
40 INJECTION, POWDER, LYOPHILIZED, FOR SOLUTION INTRAVENOUS
Status: DISCONTINUED | OUTPATIENT
Start: 2025-03-16 | End: 2025-03-21 | Stop reason: HOSPADM

## 2025-03-16 RX ORDER — HYDROMORPHONE HCL/PF 1 MG/ML
0.5 SYRINGE (ML) INJECTION ONCE
Status: COMPLETED | OUTPATIENT
Start: 2025-03-16 | End: 2025-03-16

## 2025-03-16 RX ADMIN — METRONIDAZOLE 500 MG: 500 INJECTION, SOLUTION INTRAVENOUS at 18:33

## 2025-03-16 RX ADMIN — HYDROMORPHONE HYDROCHLORIDE 1 MG: 1 INJECTION, SOLUTION INTRAMUSCULAR; INTRAVENOUS; SUBCUTANEOUS at 14:42

## 2025-03-16 RX ADMIN — CLOPIDOGREL BISULFATE 75 MG: 75 TABLET ORAL at 09:37

## 2025-03-16 RX ADMIN — LEVOTHYROXINE SODIUM 112 MCG: 112 TABLET ORAL at 05:29

## 2025-03-16 RX ADMIN — ONDANSETRON 4 MG: 2 INJECTION INTRAMUSCULAR; INTRAVENOUS at 03:12

## 2025-03-16 RX ADMIN — HYDROMORPHONE HYDROCHLORIDE 1 MG: 1 INJECTION, SOLUTION INTRAMUSCULAR; INTRAVENOUS; SUBCUTANEOUS at 20:17

## 2025-03-16 RX ADMIN — HEPARIN SODIUM 5000 UNITS: 5000 INJECTION INTRAVENOUS; SUBCUTANEOUS at 14:44

## 2025-03-16 RX ADMIN — ASPIRIN 81 MG CHEWABLE TABLET 81 MG: 81 TABLET CHEWABLE at 09:37

## 2025-03-16 RX ADMIN — METRONIDAZOLE 500 MG: 500 INJECTION, SOLUTION INTRAVENOUS at 11:17

## 2025-03-16 RX ADMIN — METRONIDAZOLE 500 MG: 500 INJECTION, SOLUTION INTRAVENOUS at 03:45

## 2025-03-16 RX ADMIN — HYDROMORPHONE HYDROCHLORIDE 1 MG: 1 INJECTION, SOLUTION INTRAMUSCULAR; INTRAVENOUS; SUBCUTANEOUS at 02:50

## 2025-03-16 RX ADMIN — HYDROMORPHONE HYDROCHLORIDE 0.5 MG: 1 INJECTION, SOLUTION INTRAMUSCULAR; INTRAVENOUS; SUBCUTANEOUS at 05:23

## 2025-03-16 RX ADMIN — PANTOPRAZOLE SODIUM 40 MG: 40 INJECTION, POWDER, LYOPHILIZED, FOR SOLUTION INTRAVENOUS at 10:13

## 2025-03-16 RX ADMIN — ACETAMINOPHEN 650 MG: 325 TABLET, FILM COATED ORAL at 03:12

## 2025-03-16 RX ADMIN — HEPARIN SODIUM 5000 UNITS: 5000 INJECTION INTRAVENOUS; SUBCUTANEOUS at 05:29

## 2025-03-16 RX ADMIN — HYDROMORPHONE HYDROCHLORIDE 1 MG: 1 INJECTION, SOLUTION INTRAMUSCULAR; INTRAVENOUS; SUBCUTANEOUS at 09:48

## 2025-03-16 RX ADMIN — FAMOTIDINE 20 MG: 20 TABLET, FILM COATED ORAL at 18:13

## 2025-03-16 RX ADMIN — HEPARIN SODIUM 5000 UNITS: 5000 INJECTION INTRAVENOUS; SUBCUTANEOUS at 21:37

## 2025-03-16 RX ADMIN — ONDANSETRON 4 MG: 2 INJECTION INTRAMUSCULAR; INTRAVENOUS at 14:49

## 2025-03-16 RX ADMIN — UMECLIDINIUM 1 PUFF: 62.5 AEROSOL, POWDER ORAL at 10:00

## 2025-03-16 RX ADMIN — FAMOTIDINE 20 MG: 20 TABLET, FILM COATED ORAL at 09:37

## 2025-03-16 NOTE — ASSESSMENT & PLAN NOTE
59-year-old male with history of type 1 diabetes, coronary artery disease who initially presented with significant generalized abdominal pain and inability to tolerate p.o. intake  H/o gastroparesis   Suspect likely secondary to possible colitis as noted on CT scan to have mild colitis presented with 1 day of diarrhea/dark stools  Continue IV pain control, educated patient on effects of opioid use with gastroparesis  Continue IV flagyl pending stool studies   Protonix, Pepcid, Maalox  Gentle IVF until tolerating PO  Advance diet as able  Zofran as needed  Follow up c.diff and stool enteric panel   Stool record at bedside, FOBT

## 2025-03-16 NOTE — PLAN OF CARE

## 2025-03-16 NOTE — PROGRESS NOTES
Progress Note - Hospitalist   Name: Brian Butterfield 59 y.o. male I MRN: [id number]  Unit/Bed#: W -01 I Date of Admission: 3/15/2025   Date of Service: 3/16/2025 I Hospital Day: 0    Assessment & Plan  Intractable abdominal pain  59-year-old male with history of type 1 diabetes, coronary artery disease who initially presented with significant generalized abdominal pain and inability to tolerate p.o. intake  H/o gastroparesis   Suspect likely secondary to possible colitis as noted on CT scan to have mild colitis presented with 1 day of diarrhea/dark stools  Continue IV pain control, educated patient on effects of opioid use with gastroparesis  Continue IV flagyl pending stool studies   Protonix, Pepcid, Maalox  Gentle IVF until tolerating PO  Advance diet as able  Zofran as needed  Follow up c.diff and stool enteric panel   Stool record at bedside, FOBT  COPD (chronic obstructive pulmonary disease) (HCC)  Not in acute exacerbation  Continue home inhalers  CAD S/P percutaneous coronary angioplasty  History of coronary artery disease status post PCI 2020 and 2021  Continue aspirin, Plavix   Diabetic polyneuropathy associated with type 1 diabetes mellitus (HCC)    Lab Results   Component Value Date    HGBA1C 8.2 (H) 03/15/2025   Continue home insulin pump    VTE Pharmacologic Prophylaxis: VTE Score: 5 High Risk (Score >/= 5) - Pharmacological DVT Prophylaxis Ordered: heparin. Sequential Compression Devices Ordered.    Mobility:   Basic Mobility Inpatient Raw Score: 24  JH-HLM Goal: 8: Walk 250 feet or more  JH-HLM Achieved: 6: Walk 10 steps or more  JH-HLM Goal NOT achieved. Continue with multidisciplinary rounding and encourage appropriate mobility to improve upon JH-HLM goals.    Patient Centered Rounds: I performed bedside rounds with nursing staff today.   Discussions with Specialists or Other Care Team Provider: RN    Education and Discussions with Family / Patient: Patient declined call to .      Current Length of Stay: 0 day(s)  Current Patient Status: Observation   Certification Statement: The patient will continue to require additional inpatient hospital stay due to IV fluids, pain control, advancing diet  Discharge Plan: Anticipate discharge in 24-48 hrs to home.    Code Status: Level 1 - Full Code    Subjective   Patient complaining of 9/10 lower abdominal pain/cramping with associated nausea.  Denies any vomiting.  Notes that prior to admission he had dark stools/diarrhea.  Has not had a bowel movement since his admission.  Notes some shortness of breath with nausea.  Reports chronic bilateral leg pain.  Feeling some chills    Objective :  Temp:  [98.2 °F (36.8 °C)-98.5 °F (36.9 °C)] 98.3 °F (36.8 °C)  HR:  [48-66] 53  BP: ()/(50-72) 109/53  Resp:  [18-22] 18  SpO2:  [96 %-100 %] 97 %  O2 Device: None (Room air)    Body mass index is 27.26 kg/m².     Input and Output Summary (last 24 hours):     Intake/Output Summary (Last 24 hours) at 3/16/2025 0938  Last data filed at 3/15/2025 2145  Gross per 24 hour   Intake 1150 ml   Output --   Net 1150 ml       Physical Exam  Vitals reviewed.   Constitutional:       General: He is not in acute distress.     Appearance: He is not ill-appearing, toxic-appearing or diaphoretic.      Comments: Patient intermittently heaving/gagging   HENT:      Mouth/Throat:      Mouth: Mucous membranes are moist.   Cardiovascular:      Rate and Rhythm: Normal rate and regular rhythm.      Heart sounds: Normal heart sounds.   Pulmonary:      Effort: Pulmonary effort is normal.      Breath sounds: Normal breath sounds. No rales.   Abdominal:      General: Abdomen is flat. Bowel sounds are decreased. There is no distension.      Palpations: Abdomen is soft.      Tenderness: There is abdominal tenderness in the left lower quadrant. There is no guarding or rebound.   Musculoskeletal:      Right lower leg: No edema.      Left lower leg: No edema.   Skin:     General: Skin is  warm and dry.   Neurological:      Mental Status: He is alert and oriented to person, place, and time.           Lines/Drains:              Lab Results: I have reviewed the following results:   Results from last 7 days   Lab Units 03/15/25  1555   WBC Thousand/uL 11.93*   HEMOGLOBIN g/dL 13.5   HEMATOCRIT % 38.8   PLATELETS Thousands/uL 225   SEGS PCT % 82*   LYMPHO PCT % 11*   MONO PCT % 6   EOS PCT % 0     Results from last 7 days   Lab Units 03/15/25  1555   SODIUM mmol/L 141   POTASSIUM mmol/L 3.5   CHLORIDE mmol/L 100   CO2 mmol/L 30   BUN mg/dL 14   CREATININE mg/dL 0.92   ANION GAP mmol/L 11   CALCIUM mg/dL 9.5   ALBUMIN g/dL 4.4   TOTAL BILIRUBIN mg/dL 0.60   ALK PHOS U/L 72   ALT U/L 14   AST U/L 30   GLUCOSE RANDOM mg/dL 120         Results from last 7 days   Lab Units 03/16/25  0705 03/15/25  2247 03/15/25  1544   POC GLUCOSE mg/dl 121 250* 128     Results from last 7 days   Lab Units 03/15/25  1942   HEMOGLOBIN A1C % 8.2*           Recent Cultures (last 7 days):               Last 24 Hours Medication List:     Current Facility-Administered Medications:     acetaminophen (TYLENOL) tablet 650 mg, Q6H PRN    aspirin chewable tablet 81 mg, Daily    clopidogrel (PLAVIX) tablet 75 mg, Daily    famotidine (PEPCID) tablet 20 mg, BID    heparin (porcine) subcutaneous injection 5,000 Units, Q8H GUEVARA    HYDROmorphone (DILAUDID) injection 0.5 mg, Q4H PRN    HYDROmorphone (DILAUDID) injection 1 mg, Q4H PRN **OR** morphine injection 2 mg, Q4H PRN    insulin aspart (NovoLOG) FOR PUMP REFILLS 1 Units, Daily PRN    insulin lispro (HumALOG/ADMELOG) 100 units/mL subcutaneous injection 1-5 Units, TID AC **AND** Fingerstick Glucose (POCT), TID AC    insulin lispro (HumALOG/ADMELOG) 100 units/mL subcutaneous injection 1-5 Units, HS    levothyroxine tablet 112 mcg, Early Morning    lisinopril (ZESTRIL) tablet 2.5 mg, Daily    metroNIDAZOLE (FLAGYL) IVPB (premix) 500 mg 100 mL, Q8H, Last Rate: 500 mg (03/16/25 0345)     ondansetron (ZOFRAN) injection 4 mg, Q6H PRN    pantoprazole (PROTONIX) EC tablet 40 mg, Daily    PATIENT MAINTAINED INSULIN PUMP 1 each, Q8H    sodium chloride 0.9 % infusion, Continuous, Last Rate: 75 mL/hr (03/15/25 1943)    umeclidinium 62.5 mcg/actuation inhaler AEPB 1 puff, Daily    Administrative Statements   Today, Patient Was Seen By: Shannon Joyce PA-C      **Please Note: This note may have been constructed using a voice recognition system.**

## 2025-03-16 NOTE — PLAN OF CARE

## 2025-03-17 PROBLEM — R19.5 DARK STOOLS: Status: ACTIVE | Noted: 2025-03-17

## 2025-03-17 PROBLEM — R19.7 DIARRHEA OF PRESUMED INFECTIOUS ORIGIN: Status: ACTIVE | Noted: 2025-03-17

## 2025-03-17 LAB
ANION GAP SERPL CALCULATED.3IONS-SCNC: 7 MMOL/L (ref 4–13)
BUN SERPL-MCNC: 13 MG/DL (ref 5–25)
CALCIUM SERPL-MCNC: 8.4 MG/DL (ref 8.4–10.2)
CHLORIDE SERPL-SCNC: 104 MMOL/L (ref 96–108)
CO2 SERPL-SCNC: 27 MMOL/L (ref 21–32)
CREAT SERPL-MCNC: 0.9 MG/DL (ref 0.6–1.3)
ERYTHROCYTE [DISTWIDTH] IN BLOOD BY AUTOMATED COUNT: 12.8 % (ref 11.6–15.1)
GFR SERPL CREATININE-BSD FRML MDRD: 94 ML/MIN/1.73SQ M
GLUCOSE SERPL-MCNC: 188 MG/DL (ref 65–140)
GLUCOSE SERPL-MCNC: 222 MG/DL (ref 65–140)
GLUCOSE SERPL-MCNC: 239 MG/DL (ref 65–140)
GLUCOSE SERPL-MCNC: 70 MG/DL (ref 65–140)
GLUCOSE SERPL-MCNC: 77 MG/DL (ref 65–140)
HCT VFR BLD AUTO: 37.6 % (ref 36.5–49.3)
HEMOCCULT STL QL IA: POSITIVE
HGB BLD-MCNC: 12.4 G/DL (ref 12–17)
MCH RBC QN AUTO: 31.7 PG (ref 26.8–34.3)
MCHC RBC AUTO-ENTMCNC: 33 G/DL (ref 31.4–37.4)
MCV RBC AUTO: 96 FL (ref 82–98)
PLATELET # BLD AUTO: 168 THOUSANDS/UL (ref 149–390)
PMV BLD AUTO: 10 FL (ref 8.9–12.7)
POTASSIUM SERPL-SCNC: 3.9 MMOL/L (ref 3.5–5.3)
RBC # BLD AUTO: 3.91 MILLION/UL (ref 3.88–5.62)
SODIUM SERPL-SCNC: 138 MMOL/L (ref 135–147)
WBC # BLD AUTO: 6.76 THOUSAND/UL (ref 4.31–10.16)

## 2025-03-17 PROCEDURE — 85027 COMPLETE CBC AUTOMATED: CPT

## 2025-03-17 PROCEDURE — G0328 FECAL BLOOD SCRN IMMUNOASSAY: HCPCS

## 2025-03-17 PROCEDURE — 80048 BASIC METABOLIC PNL TOTAL CA: CPT

## 2025-03-17 PROCEDURE — 87493 C DIFF AMPLIFIED PROBE: CPT | Performed by: INTERNAL MEDICINE

## 2025-03-17 PROCEDURE — 82948 REAGENT STRIP/BLOOD GLUCOSE: CPT

## 2025-03-17 PROCEDURE — 99232 SBSQ HOSP IP/OBS MODERATE 35: CPT

## 2025-03-17 PROCEDURE — 99222 1ST HOSP IP/OBS MODERATE 55: CPT | Performed by: STUDENT IN AN ORGANIZED HEALTH CARE EDUCATION/TRAINING PROGRAM

## 2025-03-17 RX ORDER — METRONIDAZOLE 500 MG/100ML
500 INJECTION, SOLUTION INTRAVENOUS EVERY 8 HOURS
Status: DISCONTINUED | OUTPATIENT
Start: 2025-03-17 | End: 2025-03-19

## 2025-03-17 RX ORDER — DICYCLOMINE HYDROCHLORIDE 10 MG/1
10 CAPSULE ORAL
Status: DISCONTINUED | OUTPATIENT
Start: 2025-03-17 | End: 2025-03-21 | Stop reason: HOSPADM

## 2025-03-17 RX ADMIN — HYDROMORPHONE HYDROCHLORIDE 0.5 MG: 1 INJECTION, SOLUTION INTRAMUSCULAR; INTRAVENOUS; SUBCUTANEOUS at 21:28

## 2025-03-17 RX ADMIN — LEVOTHYROXINE SODIUM 112 MCG: 112 TABLET ORAL at 05:22

## 2025-03-17 RX ADMIN — HYDROMORPHONE HYDROCHLORIDE 1 MG: 1 INJECTION, SOLUTION INTRAMUSCULAR; INTRAVENOUS; SUBCUTANEOUS at 00:42

## 2025-03-17 RX ADMIN — FAMOTIDINE 20 MG: 20 TABLET, FILM COATED ORAL at 16:15

## 2025-03-17 RX ADMIN — HEPARIN SODIUM 5000 UNITS: 5000 INJECTION INTRAVENOUS; SUBCUTANEOUS at 14:29

## 2025-03-17 RX ADMIN — PANTOPRAZOLE SODIUM 40 MG: 40 INJECTION, POWDER, LYOPHILIZED, FOR SOLUTION INTRAVENOUS at 08:30

## 2025-03-17 RX ADMIN — UMECLIDINIUM 1 PUFF: 62.5 AEROSOL, POWDER ORAL at 08:54

## 2025-03-17 RX ADMIN — ONDANSETRON 4 MG: 2 INJECTION INTRAMUSCULAR; INTRAVENOUS at 14:23

## 2025-03-17 RX ADMIN — MORPHINE SULFATE 2 MG: 2 INJECTION, SOLUTION INTRAMUSCULAR; INTRAVENOUS at 14:29

## 2025-03-17 RX ADMIN — METRONIDAZOLE 500 MG: 500 INJECTION, SOLUTION INTRAVENOUS at 19:27

## 2025-03-17 RX ADMIN — METRONIDAZOLE 500 MG: 500 INJECTION, SOLUTION INTRAVENOUS at 04:27

## 2025-03-17 RX ADMIN — ONDANSETRON 4 MG: 2 INJECTION INTRAMUSCULAR; INTRAVENOUS at 04:27

## 2025-03-17 RX ADMIN — ASPIRIN 81 MG CHEWABLE TABLET 81 MG: 81 TABLET CHEWABLE at 08:52

## 2025-03-17 RX ADMIN — HEPARIN SODIUM 5000 UNITS: 5000 INJECTION INTRAVENOUS; SUBCUTANEOUS at 21:28

## 2025-03-17 RX ADMIN — METRONIDAZOLE 500 MG: 500 INJECTION, SOLUTION INTRAVENOUS at 12:19

## 2025-03-17 RX ADMIN — HYDROMORPHONE HYDROCHLORIDE 0.5 MG: 1 INJECTION, SOLUTION INTRAMUSCULAR; INTRAVENOUS; SUBCUTANEOUS at 04:27

## 2025-03-17 RX ADMIN — DICYCLOMINE HYDROCHLORIDE 10 MG: 10 CAPSULE ORAL at 16:15

## 2025-03-17 RX ADMIN — SODIUM CHLORIDE 75 ML/HR: 0.9 INJECTION, SOLUTION INTRAVENOUS at 21:28

## 2025-03-17 RX ADMIN — HYDROMORPHONE HYDROCHLORIDE 1 MG: 1 INJECTION, SOLUTION INTRAMUSCULAR; INTRAVENOUS; SUBCUTANEOUS at 19:27

## 2025-03-17 RX ADMIN — DICYCLOMINE HYDROCHLORIDE 10 MG: 10 CAPSULE ORAL at 21:28

## 2025-03-17 RX ADMIN — CLOPIDOGREL BISULFATE 75 MG: 75 TABLET ORAL at 08:52

## 2025-03-17 RX ADMIN — HYDROMORPHONE HYDROCHLORIDE 1 MG: 1 INJECTION, SOLUTION INTRAMUSCULAR; INTRAVENOUS; SUBCUTANEOUS at 09:01

## 2025-03-17 RX ADMIN — HEPARIN SODIUM 5000 UNITS: 5000 INJECTION INTRAVENOUS; SUBCUTANEOUS at 05:22

## 2025-03-17 RX ADMIN — LISINOPRIL 2.5 MG: 2.5 TABLET ORAL at 08:52

## 2025-03-17 RX ADMIN — ONDANSETRON 4 MG: 2 INJECTION INTRAMUSCULAR; INTRAVENOUS at 21:34

## 2025-03-17 RX ADMIN — FAMOTIDINE 20 MG: 20 TABLET, FILM COATED ORAL at 08:52

## 2025-03-17 NOTE — PLAN OF CARE

## 2025-03-17 NOTE — ASSESSMENT & PLAN NOTE
Patient with 3 days of dark/burgundy colored loose stools  history of gastroparesis and diverticulosis seen on scope in 2021  CT A/P on admission noted diverticula, diffuse colonic thickening which may represent mild inflammation or colitis  Likely related to colitis which could be infectious, inflammatory or ischemic versus bleeding diverticula  Hemoglobin stableat 12 though this is lower than his baseline in the past which has been 13-14  FOBT positive  Follow-up C. difficile and stool enteric panel  Continue IV Flagyl  Stool record at bedside  GI consult

## 2025-03-17 NOTE — PLAN OF CARE

## 2025-03-17 NOTE — PROGRESS NOTES
Progress Note - Hospitalist   Name: Brian Butterfield 59 y.o. male I MRN: [id number]  Unit/Bed#: W -01 I Date of Admission: 3/15/2025   Date of Service: 3/17/2025 I Hospital Day: 1    Assessment & Plan  Intractable abdominal pain  59-year-old male with history of type 1 diabetes, coronary artery disease who initially presented with significant generalized abdominal pain and inability to tolerate p.o. intake  H/o gastroparesis   Suspect likely secondary to possible colitis as noted on CT scan to have mild colitis presented with 1 day of diarrhea/dark stools  GI consult given associated dark stools to r/o bleed   Continue pain control, educated patient on effects of opioid use with gastroparesis  Continue IV Flagyl  Protonix, Pepcid, Maalox  Gentle IVF until tolerating PO  Advance diet as able  Zofran as needed  Aqua K-pad for abdomen  Dark stools  Patient with 3 days of dark/burgundy colored loose stools  history of gastroparesis and diverticulosis seen on scope in 2021  CT A/P on admission noted diverticula, diffuse colonic thickening which may represent mild inflammation or colitis  Likely related to colitis which could be infectious, inflammatory or ischemic versus bleeding diverticula  Hemoglobin stableat 12 though this is lower than his baseline in the past which has been 13-14  FOBT positive  Follow-up C. difficile and stool enteric panel  Continue IV Flagyl  Stool record at bedside  GI consult  COPD (chronic obstructive pulmonary disease) (HCC)  Not in acute exacerbation  Continue home inhalers  CAD S/P percutaneous coronary angioplasty  History of coronary artery disease status post PCI 2020 and 2021  Continue aspirin, Plavix   Diabetic polyneuropathy associated with type 1 diabetes mellitus (HCC)    Lab Results   Component Value Date    HGBA1C 8.2 (H) 03/15/2025   Continue home insulin pump    VTE Pharmacologic Prophylaxis: VTE Score: 5 High Risk (Score >/= 5) - Pharmacological DVT Prophylaxis Ordered:  heparin. Sequential Compression Devices Ordered.    Mobility:   Basic Mobility Inpatient Raw Score: 24  JH-HLM Goal: 8: Walk 250 feet or more  JH-HLM Achieved: 7: Walk 25 feet or more  JH-HLM Goal NOT achieved. Continue with multidisciplinary rounding and encourage appropriate mobility to improve upon JH-HLM goals.    Patient Centered Rounds: I performed bedside rounds with nursing staff today.   Discussions with Specialists or Other Care Team Provider: GI, CM     Education and Discussions with Family / Patient: Patient declined call to .     Current Length of Stay: 1 day(s)  Current Patient Status: Inpatient   Certification Statement: The patient will continue to require additional inpatient hospital stay due to intolerance of p.o. intake, GI evaluation  Discharge Plan: Anticipate discharge in 24-48 hrs to home.    Code Status: Level 1 - Full Code    Subjective   Patient still complaining of 7/10 abdominal cramping in the center of his abdomen, his dyspepsia has improved with the Maalox but still having increased gas production.  Passing flatus.  Intermittently feels fevers and chills.  Was able to eat a small amount of dinner and breakfast this morning.  Did note that he had a small burgundy colored BM this morning.  Denies any chest pain, shortness of breath, nausea or vomiting.    Objective :  Temp:  [97.9 °F (36.6 °C)-98.5 °F (36.9 °C)] 97.9 °F (36.6 °C)  HR:  [54-64] 64  BP: ()/(46-69) 139/69  Resp:  [16] 16  SpO2:  [96 %-97 %] 96 %    Body mass index is 27.26 kg/m².     Input and Output Summary (last 24 hours):     Intake/Output Summary (Last 24 hours) at 3/17/2025 1211  Last data filed at 3/17/2025 0900  Gross per 24 hour   Intake 720 ml   Output 200 ml   Net 520 ml       Physical Exam  Vitals reviewed.   Constitutional:       General: He is not in acute distress.     Appearance: Normal appearance. He is not diaphoretic.   HENT:      Mouth/Throat:      Mouth: Mucous membranes are moist.    Cardiovascular:      Rate and Rhythm: Normal rate and regular rhythm.      Heart sounds: Normal heart sounds.   Pulmonary:      Effort: Pulmonary effort is normal.      Breath sounds: Normal breath sounds.   Abdominal:      General: Abdomen is flat. Bowel sounds are normal. There is no distension.      Palpations: Abdomen is soft.      Tenderness: There is abdominal tenderness. There is no guarding or rebound.   Skin:     General: Skin is warm and dry.   Neurological:      Mental Status: He is alert and oriented to person, place, and time.           Lines/Drains:              Lab Results: I have reviewed the following results:   Results from last 7 days   Lab Units 03/17/25  0904 03/16/25  1018   WBC Thousand/uL 6.76 9.52   HEMOGLOBIN g/dL 12.4 12.7   HEMATOCRIT % 37.6 37.8   PLATELETS Thousands/uL 168 194   SEGS PCT %  --  72   LYMPHO PCT %  --  20   MONO PCT %  --  8   EOS PCT %  --  0     Results from last 7 days   Lab Units 03/17/25  0904 03/16/25  1018 03/15/25  1555   SODIUM mmol/L 138   < > 141   POTASSIUM mmol/L 3.9   < > 3.5   CHLORIDE mmol/L 104   < > 100   CO2 mmol/L 27   < > 30   BUN mg/dL 13   < > 14   CREATININE mg/dL 0.90   < > 0.92   ANION GAP mmol/L 7   < > 11   CALCIUM mg/dL 8.4   < > 9.5   ALBUMIN g/dL  --   --  4.4   TOTAL BILIRUBIN mg/dL  --   --  0.60   ALK PHOS U/L  --   --  72   ALT U/L  --   --  14   AST U/L  --   --  30   GLUCOSE RANDOM mg/dL 188*   < > 120    < > = values in this interval not displayed.         Results from last 7 days   Lab Units 03/17/25  1112 03/17/25  0719 03/16/25  2119 03/16/25  1706 03/16/25  1634 03/16/25  1612 03/16/25  1147 03/16/25  1111 03/16/25  0705 03/15/25  2247 03/15/25  1544   POC GLUCOSE mg/dl 239* 70 54* 157* 71 69 83 72 121 250* 128     Results from last 7 days   Lab Units 03/15/25  1942   HEMOGLOBIN A1C % 8.2*           Recent Cultures (last 7 days):               Last 24 Hours Medication List:     Current Facility-Administered Medications:      acetaminophen (TYLENOL) tablet 650 mg, Q6H PRN    aluminum-magnesium hydroxide-simethicone (MAALOX) oral suspension 30 mL, Q4H PRN    aspirin chewable tablet 81 mg, Daily    clopidogrel (PLAVIX) tablet 75 mg, Daily    famotidine (PEPCID) tablet 20 mg, BID    heparin (porcine) subcutaneous injection 5,000 Units, Q8H GUEVARA    HYDROmorphone (DILAUDID) injection 0.5 mg, Q4H PRN    HYDROmorphone (DILAUDID) injection 1 mg, Q4H PRN **OR** morphine injection 2 mg, Q4H PRN    insulin aspart (NovoLOG) FOR PUMP REFILLS 1 Units, Daily PRN    insulin lispro (HumALOG/ADMELOG) 100 units/mL subcutaneous injection 1-5 Units, TID AC **AND** Fingerstick Glucose (POCT), TID AC    insulin lispro (HumALOG/ADMELOG) 100 units/mL subcutaneous injection 1-5 Units, HS    levothyroxine tablet 112 mcg, Early Morning    lisinopril (ZESTRIL) tablet 2.5 mg, Daily    metroNIDAZOLE (FLAGYL) IVPB (premix) 500 mg 100 mL, Q8H    ondansetron (ZOFRAN) injection 4 mg, Q6H PRN    pantoprazole (PROTONIX) injection 40 mg, Q24H GUEVARA    PATIENT MAINTAINED INSULIN PUMP 1 each, Q8H    sodium chloride 0.9 % infusion, Continuous, Last Rate: 75 mL/hr (03/15/25 1943)    umeclidinium 62.5 mcg/actuation inhaler AEPB 1 puff, Daily    Administrative Statements   Today, Patient Was Seen By: Shannon Joyce PA-C      **Please Note: This note may have been constructed using a voice recognition system.**

## 2025-03-17 NOTE — ED ATTENDING ATTESTATION
3/15/2025  I, Malka Pulliam MD, saw and evaluated the patient. I have discussed the patient with the resident/non-physician practitioner and agree with the resident's/non-physician practitioner's findings, Plan of Care, and MDM as documented in the resident's/non-physician practitioner's note, except where noted. All available labs and Radiology studies were reviewed.  I was present for key portions of any procedure(s) performed by the resident/non-physician practitioner and I was immediately available to provide assistance.       At this point I agree with the current assessment done in the Emergency Department.  I have conducted an independent evaluation of this patient a history and physical is as follows:      59-year-old male presenting to the ER with abdominal pain, chest pain, nausea vomiting and diarrhea.  History of 3 stents.  While getting x-ray in the ER had syncopal episode.  In distress in the room.    Depression includes gastritis, gastroenteritis, dissection, pancreatitis, gallbladder pathology, CAD. will get dissection study, cardiac workup, abdominal labs, pain control    ED Course         Critical Care Time  Procedures

## 2025-03-17 NOTE — CONSULTS
Consultation - Gastroenterology   Name: Brian Butterfield 59 y.o. male I MRN: [id number]  Unit/Bed#: W -01 I Date of Admission: 3/15/2025   Date of Service: 3/17/2025 I Hospital Day: 1   Inpatient consult to gastroenterology  Consult performed by: America Muller PA-C  Consult ordered by: Shannon Joyce PA-C        Physician Requesting Evaluation: Arielle Yanez, *   Reason for Evaluation / Principal Problem: diarrhea, colitis, abdominal pain     Assessment & Plan  Diarrhea of presumed infectious origin  -sudden onset of abdominal pain Friday, diarrhea Saturday, had some vomiting prior to admission. Suspect infectious etiology. Afebrile. WBC count and electrolytes normal. Ct concerned for thickened colon versus underdistention  -agree with stool studies to rule out infection  -consider colonoscopy inpatient if stool studies negative and symptoms not improving. Otherwise, this can be done in 6-8 weeks as outpatient  -would back off diet to clears the rest of the day as he had worsening pain after eating mac and cheese today   Intractable abdominal pain  -related to above  -will add bentyl for cramping   Dark stools  -likely secondary to infection, could have some mild bleeding from colitis but hgb is 12.4.   -consider colonoscopy as above  -monitor hgb       History of Present Illness   HPI:  Brian Butterfield is a 59 y.o. male with history of COPD, CAD with PCR in 2020 and 2021 on ASA and Plavix, type 1 diabetes who presented to the hospital for abdominal pain, diarrhea, and dark stools. He reports he had sudden onset of abdominal pain on Friday and started with several episodes of diarrhea on Saturday. He also had some vomiting at home prior to admission. Pain is all over the abdomen. Has never had colitis before. Denies any recent abx use, sick contacts, abnormal food intake, travel. He reports stools are dark. He had a colonoscopy about 4 years ago at [facility] and had some polyps removed.     Review  of Systems   Constitutional:  Positive for appetite change. Negative for activity change, fever and unexpected weight change.   HENT:  Negative for drooling, mouth sores, sore throat, trouble swallowing and voice change.    Eyes:  Negative for pain, discharge and visual disturbance.   Respiratory:  Negative for cough, choking, chest tightness and shortness of breath.    Cardiovascular:  Negative for chest pain, palpitations and leg swelling.   Gastrointestinal:  Positive for abdominal pain, blood in stool, diarrhea, nausea and vomiting. Negative for abdominal distention, anal bleeding, constipation and rectal pain.   Genitourinary:  Negative for decreased urine volume, difficulty urinating, dysuria, flank pain and urgency.   Musculoskeletal:  Negative for arthralgias, back pain, gait problem, myalgias and neck stiffness.   Skin:  Negative for color change, pallor and rash.   Neurological:  Negative for dizziness, syncope and light-headedness.   Hematological:  Negative for adenopathy.   Psychiatric/Behavioral:  Negative for confusion. The patient is not nervous/anxious.      Medical History Review: I have reviewed the patient's PMH, PSH, Social History, Family History, Meds, and Allergies     Objective :  Temp:  [97.9 °F (36.6 °C)-98.5 °F (36.9 °C)] 97.9 °F (36.6 °C)  HR:  [54-64] 64  BP: (117-139)/(53-69) 139/69  Resp:  [16] 16  SpO2:  [96 %-97 %] 96 %    Physical Exam  Constitutional:       General: He is not in acute distress.     Appearance: Normal appearance. He is well-developed.   HENT:      Head: Normocephalic and atraumatic.      Mouth/Throat:      Mouth: Mucous membranes are moist.   Eyes:      General: No scleral icterus.  Neck:      Trachea: No tracheal deviation.   Cardiovascular:      Rate and Rhythm: Normal rate and regular rhythm.      Heart sounds: Normal heart sounds. No murmur heard.  Pulmonary:      Effort: Pulmonary effort is normal. No respiratory distress.      Breath sounds: Normal breath  sounds. No wheezing or rales.   Chest:      Chest wall: No tenderness.   Abdominal:      General: Bowel sounds are normal. There is no distension.      Palpations: Abdomen is soft. There is no mass.      Tenderness: There is generalized abdominal tenderness. There is no guarding or rebound.   Musculoskeletal:         General: Normal range of motion.      Cervical back: Normal range of motion and neck supple.   Skin:     General: Skin is warm and dry.      Coloration: Skin is not pale.      Findings: No rash.   Neurological:      Mental Status: He is alert and oriented to person, place, and time. Mental status is at baseline.   Psychiatric:         Mood and Affect: Mood normal.         Behavior: Behavior normal.           Lab Results: I have reviewed the following results:

## 2025-03-17 NOTE — ASSESSMENT & PLAN NOTE
59-year-old male with history of type 1 diabetes, coronary artery disease who initially presented with significant generalized abdominal pain and inability to tolerate p.o. intake  H/o gastroparesis   Suspect likely secondary to possible colitis as noted on CT scan to have mild colitis presented with 1 day of diarrhea/dark stools  GI consult given associated dark stools to r/o bleed   Continue pain control, educated patient on effects of opioid use with gastroparesis  Continue IV Flagyl  Protonix, Pepcid, Maalox  Gentle IVF until tolerating PO  Advance diet as able  Zofran as needed  Aqua K-pad for abdomen

## 2025-03-17 NOTE — ASSESSMENT & PLAN NOTE
-likely secondary to infection, could have some mild bleeding from colitis but hgb is 12.4.   -consider colonoscopy as above  -monitor hgb

## 2025-03-17 NOTE — ASSESSMENT & PLAN NOTE
-sudden onset of abdominal pain Friday, diarrhea Saturday, had some vomiting prior to admission. Suspect infectious etiology. Afebrile. WBC count and electrolytes normal. Ct concerned for thickened colon versus underdistention  -agree with stool studies to rule out infection  -consider colonoscopy inpatient if stool studies negative and symptoms not improving. Otherwise, this can be done in 6-8 weeks as outpatient  -would back off diet to clears the rest of the day as he had worsening pain after eating mac and cheese today

## 2025-03-18 LAB
ANION GAP SERPL CALCULATED.3IONS-SCNC: 4 MMOL/L (ref 4–13)
BUN SERPL-MCNC: 10 MG/DL (ref 5–25)
C DIFF TOX GENS STL QL NAA+PROBE: NEGATIVE
CALCIUM SERPL-MCNC: 8.2 MG/DL (ref 8.4–10.2)
CHLORIDE SERPL-SCNC: 104 MMOL/L (ref 96–108)
CO2 SERPL-SCNC: 29 MMOL/L (ref 21–32)
CREAT SERPL-MCNC: 0.87 MG/DL (ref 0.6–1.3)
ERYTHROCYTE [DISTWIDTH] IN BLOOD BY AUTOMATED COUNT: 12.6 % (ref 11.6–15.1)
GFR SERPL CREATININE-BSD FRML MDRD: 95 ML/MIN/1.73SQ M
GLUCOSE SERPL-MCNC: 112 MG/DL (ref 65–140)
GLUCOSE SERPL-MCNC: 166 MG/DL (ref 65–140)
GLUCOSE SERPL-MCNC: 174 MG/DL (ref 65–140)
GLUCOSE SERPL-MCNC: 183 MG/DL (ref 65–140)
GLUCOSE SERPL-MCNC: 211 MG/DL (ref 65–140)
HCT VFR BLD AUTO: 34.2 % (ref 36.5–49.3)
HGB BLD-MCNC: 11.8 G/DL (ref 12–17)
MCH RBC QN AUTO: 32.2 PG (ref 26.8–34.3)
MCHC RBC AUTO-ENTMCNC: 34.5 G/DL (ref 31.4–37.4)
MCV RBC AUTO: 93 FL (ref 82–98)
PLATELET # BLD AUTO: 176 THOUSANDS/UL (ref 149–390)
PMV BLD AUTO: 10.3 FL (ref 8.9–12.7)
POTASSIUM SERPL-SCNC: 3.7 MMOL/L (ref 3.5–5.3)
RBC # BLD AUTO: 3.66 MILLION/UL (ref 3.88–5.62)
SODIUM SERPL-SCNC: 137 MMOL/L (ref 135–147)
WBC # BLD AUTO: 6.81 THOUSAND/UL (ref 4.31–10.16)

## 2025-03-18 PROCEDURE — 80048 BASIC METABOLIC PNL TOTAL CA: CPT | Performed by: INTERNAL MEDICINE

## 2025-03-18 PROCEDURE — 99232 SBSQ HOSP IP/OBS MODERATE 35: CPT | Performed by: STUDENT IN AN ORGANIZED HEALTH CARE EDUCATION/TRAINING PROGRAM

## 2025-03-18 PROCEDURE — 99232 SBSQ HOSP IP/OBS MODERATE 35: CPT

## 2025-03-18 PROCEDURE — 82948 REAGENT STRIP/BLOOD GLUCOSE: CPT

## 2025-03-18 PROCEDURE — 87505 NFCT AGENT DETECTION GI: CPT | Performed by: INTERNAL MEDICINE

## 2025-03-18 PROCEDURE — 85027 COMPLETE CBC AUTOMATED: CPT | Performed by: INTERNAL MEDICINE

## 2025-03-18 RX ORDER — HYDROMORPHONE HCL/PF 1 MG/ML
1 SYRINGE (ML) INJECTION EVERY 4 HOURS PRN
Status: DISCONTINUED | OUTPATIENT
Start: 2025-03-18 | End: 2025-03-21

## 2025-03-18 RX ORDER — POLYETHYLENE GLYCOL 3350 17 G/17G
17 POWDER, FOR SOLUTION ORAL DAILY
Status: DISCONTINUED | OUTPATIENT
Start: 2025-03-18 | End: 2025-03-21 | Stop reason: HOSPADM

## 2025-03-18 RX ADMIN — POLYETHYLENE GLYCOL 3350 17 G: 17 POWDER, FOR SOLUTION ORAL at 11:03

## 2025-03-18 RX ADMIN — METRONIDAZOLE 500 MG: 500 INJECTION, SOLUTION INTRAVENOUS at 11:03

## 2025-03-18 RX ADMIN — FAMOTIDINE 20 MG: 20 TABLET, FILM COATED ORAL at 08:16

## 2025-03-18 RX ADMIN — HEPARIN SODIUM 5000 UNITS: 5000 INJECTION INTRAVENOUS; SUBCUTANEOUS at 05:12

## 2025-03-18 RX ADMIN — METRONIDAZOLE 500 MG: 500 INJECTION, SOLUTION INTRAVENOUS at 20:18

## 2025-03-18 RX ADMIN — CLOPIDOGREL BISULFATE 75 MG: 75 TABLET ORAL at 08:16

## 2025-03-18 RX ADMIN — HYDROMORPHONE HYDROCHLORIDE 1 MG: 1 INJECTION, SOLUTION INTRAMUSCULAR; INTRAVENOUS; SUBCUTANEOUS at 11:42

## 2025-03-18 RX ADMIN — ASPIRIN 81 MG CHEWABLE TABLET 81 MG: 81 TABLET CHEWABLE at 08:16

## 2025-03-18 RX ADMIN — HYDROMORPHONE HYDROCHLORIDE 1 MG: 1 INJECTION, SOLUTION INTRAMUSCULAR; INTRAVENOUS; SUBCUTANEOUS at 07:26

## 2025-03-18 RX ADMIN — HEPARIN SODIUM 5000 UNITS: 5000 INJECTION INTRAVENOUS; SUBCUTANEOUS at 14:42

## 2025-03-18 RX ADMIN — DICYCLOMINE HYDROCHLORIDE 10 MG: 10 CAPSULE ORAL at 07:29

## 2025-03-18 RX ADMIN — ONDANSETRON 4 MG: 2 INJECTION INTRAMUSCULAR; INTRAVENOUS at 11:42

## 2025-03-18 RX ADMIN — MORPHINE SULFATE 2 MG: 2 INJECTION, SOLUTION INTRAMUSCULAR; INTRAVENOUS at 21:50

## 2025-03-18 RX ADMIN — HYDROMORPHONE HYDROCHLORIDE 0.5 MG: 1 INJECTION, SOLUTION INTRAMUSCULAR; INTRAVENOUS; SUBCUTANEOUS at 16:06

## 2025-03-18 RX ADMIN — PANTOPRAZOLE SODIUM 40 MG: 40 INJECTION, POWDER, LYOPHILIZED, FOR SOLUTION INTRAVENOUS at 07:26

## 2025-03-18 RX ADMIN — DICYCLOMINE HYDROCHLORIDE 10 MG: 10 CAPSULE ORAL at 11:03

## 2025-03-18 RX ADMIN — LISINOPRIL 2.5 MG: 2.5 TABLET ORAL at 08:16

## 2025-03-18 RX ADMIN — METRONIDAZOLE 500 MG: 500 INJECTION, SOLUTION INTRAVENOUS at 04:25

## 2025-03-18 RX ADMIN — HEPARIN SODIUM 5000 UNITS: 5000 INJECTION INTRAVENOUS; SUBCUTANEOUS at 21:51

## 2025-03-18 RX ADMIN — HYDROMORPHONE HYDROCHLORIDE 1 MG: 1 INJECTION, SOLUTION INTRAMUSCULAR; INTRAVENOUS; SUBCUTANEOUS at 02:20

## 2025-03-18 RX ADMIN — FAMOTIDINE 20 MG: 20 TABLET, FILM COATED ORAL at 16:06

## 2025-03-18 RX ADMIN — ONDANSETRON 4 MG: 2 INJECTION INTRAMUSCULAR; INTRAVENOUS at 21:03

## 2025-03-18 RX ADMIN — DICYCLOMINE HYDROCHLORIDE 10 MG: 10 CAPSULE ORAL at 16:06

## 2025-03-18 RX ADMIN — Medication 2.5 MG: at 20:18

## 2025-03-18 RX ADMIN — UMECLIDINIUM 1 PUFF: 62.5 AEROSOL, POWDER ORAL at 08:22

## 2025-03-18 RX ADMIN — ONDANSETRON 4 MG: 2 INJECTION INTRAMUSCULAR; INTRAVENOUS at 04:16

## 2025-03-18 RX ADMIN — LEVOTHYROXINE SODIUM 112 MCG: 112 TABLET ORAL at 05:12

## 2025-03-18 RX ADMIN — DICYCLOMINE HYDROCHLORIDE 10 MG: 10 CAPSULE ORAL at 21:51

## 2025-03-18 NOTE — ASSESSMENT & PLAN NOTE
-sudden onset of abdominal pain Friday, diarrhea Saturday, had some vomiting prior to admission. Suspect infectious etiology. Afebrile. WBC count and electrolytes normal. Ct concerned for thickened colon versus under distention. Still with mushy stools, c diff negative, stool PCR pending  -continue clears, NPO after midnight  -patient reports continued generalized abdominal pain with no improvement  -if stool PCR negative, will likely plan for colonoscopy with bowel prep for refractory symptoms. Can consider EGD alone tomorrow if pain persisting and hgb continues to drop, otherwise can do EGD and colonoscopy on Thursday

## 2025-03-18 NOTE — PLAN OF CARE
Problem: Potential for Falls  Goal: Patient will remain free of falls  Description: INTERVENTIONS:  - Educate patient/family on patient safety including physical limitations  - Instruct patient to call for assistance with activity   - Consult OT/PT to assist with strengthening/mobility   - Keep Call bell within reach  - Keep bed low and locked with side rails adjusted as appropriate  - Keep care items and personal belongings within reach  - Initiate and maintain comfort rounds  - Make Fall Risk Sign visible to staff  - Offer Toileting every  Hours, in advance of need  - Initiate/Maintain alarm  - Obtain necessary fall risk management equipment:   - Apply yellow socks and bracelet for high fall risk patients  - Consider moving patient to room near nurses station  Outcome: Progressing     Problem: PAIN - ADULT  Goal: Verbalizes/displays adequate comfort level or baseline comfort level  Description: Interventions:  - Encourage patient to monitor pain and request assistance  - Assess pain using appropriate pain scale  - Administer analgesics based on type and severity of pain and evaluate response  - Implement non-pharmacological measures as appropriate and evaluate response  - Consider cultural and social influences on pain and pain management  - Notify physician/advanced practitioner if interventions unsuccessful or patient reports new pain  Outcome: Progressing     Problem: INFECTION - ADULT  Goal: Absence or prevention of progression during hospitalization  Description: INTERVENTIONS:  - Assess and monitor for signs and symptoms of infection  - Monitor lab/diagnostic results  - Monitor all insertion sites, i.e. indwelling lines, tubes, and drains  - Monitor endotracheal if appropriate and nasal secretions for changes in amount and color  - [place] appropriate cooling/warming therapies per order  - Administer medications as ordered  - Instruct and encourage patient and family to use good hand hygiene technique  -  Identify and instruct in appropriate isolation precautions for identified infection/condition  Outcome: Progressing  Goal: Absence of fever/infection during neutropenic period  Description: INTERVENTIONS:  - Monitor WBC    Outcome: Progressing     Problem: SAFETY ADULT  Goal: Patient will remain free of falls  Description: INTERVENTIONS:  - Educate patient/family on patient safety including physical limitations  - Instruct patient to call for assistance with activity   - Consult OT/PT to assist with strengthening/mobility   - Keep Call bell within reach  - Keep bed low and locked with side rails adjusted as appropriate  - Keep care items and personal belongings within reach  - Initiate and maintain comfort rounds  - Make Fall Risk Sign visible to staff  - Offer Toileting every  Hours, in advance of need  - Initiate/Maintain alarm  - Obtain necessary fall risk management equipment:   - Apply yellow socks and bracelet for high fall risk patients  - Consider moving patient to room near nurses station  Outcome: Progressing  Goal: Maintain or return to baseline ADL function  Description: INTERVENTIONS:  -  Assess patient's ability to carry out ADLs; assess patient's baseline for ADL function and identify physical deficits which impact ability to perform ADLs (bathing, care of mouth/teeth, toileting, grooming, dressing, etc.)  - Assess/evaluate cause of self-care deficits   - Assess range of motion  - Assess patient's mobility; develop plan if impaired  - Assess patient's need for assistive devices and provide as appropriate  - Encourage maximum independence but intervene and supervise when necessary  - Involve family in performance of ADLs  - Assess for home care needs following discharge   - Consider OT consult to assist with ADL evaluation and planning for discharge  - Provide patient education as appropriate  Outcome: Progressing  Goal: Maintains/Returns to pre admission functional level  Description:  INTERVENTIONS:  - Perform AM-PAC 6 Click Basic Mobility/ Daily Activity assessment daily.  - Set and communicate daily mobility goal to care team and patient/family/caregiver.   - Collaborate with rehabilitation services on mobility goals if consulted  - Perform Range of Motion  times a day.  - Reposition patient every  hours.  - Dangle patient  times a day  - Stand patient  times a day  - Ambulate patient  times a day  - Out of bed to chair  times a day   - Out of bed for meals times a day  - Out of bed for toileting  - Record patient progress and toleration of activity level   Outcome: Progressing     Problem: DISCHARGE PLANNING  Goal: Discharge to home or other facility with appropriate resources  Description: INTERVENTIONS:  - Identify barriers to discharge w/patient and caregiver  - Arrange for needed discharge resources and transportation as appropriate  - Identify discharge learning needs (meds, wound care, etc.)  - Arrange for interpretive services to assist at discharge as needed  - Refer to Case Management Department for coordinating discharge planning if the patient needs post-hospital services based on physician/advanced practitioner order or complex needs related to functional status, cognitive ability, or social support system  Outcome: Progressing     Problem: Knowledge Deficit  Goal: Patient/family/caregiver demonstrates understanding of disease process, treatment plan, medications, and discharge instructions  Description: Complete learning assessment and assess knowledge base.  Interventions:  - Provide teaching at level of understanding  - Provide teaching via preferred learning methods  Outcome: Progressing     Problem: Nutrition/Hydration-ADULT  Goal: Nutrient/Hydration intake appropriate for improving, restoring or maintaining nutritional needs  Description: Monitor and assess patient's nutrition/hydration status for malnutrition. Collaborate with interdisciplinary team and initiate plan and  interventions as ordered.  Monitor patient's weight and dietary intake as ordered or per policy. Utilize nutrition screening tool and intervene as necessary. Determine patient's food preferences and provide high-protein, high-caloric foods as appropriate.     INTERVENTIONS:  - Monitor oral intake, urinary output, labs, and treatment plans  - Assess nutrition and hydration status and recommend course of action  - Evaluate amount of meals eaten  - Assist patient with eating if necessary   - Allow adequate time for meals  - Recommend/ encourage appropriate diets, oral nutritional supplements, and vitamin/mineral supplements  - Order, calculate, and assess calorie counts as needed  - Recommend, monitor, and adjust tube feedings and TPN/PPN based on assessed needs  - Assess need for intravenous fluids  - Provide specific nutrition/hydration education as appropriate  - Include patient/family/caregiver in decisions related to nutrition  Outcome: Progressing

## 2025-03-18 NOTE — ASSESSMENT & PLAN NOTE
59-year-old male with history of type 1 diabetes, coronary artery disease who initially presented with significant generalized abdominal pain and inability to tolerate p.o. intake  H/o gastroparesis   Suspect likely secondary to possible colitis as noted on CT scan to have mild colitis presented with 1 day of diarrhea/dark stools  No tolerating about 25% of meals, all liquids- DC IVF   GI consult given associated dark stools to r/o bleed   Continue IV pain control, prn bentyl - educated patient on effects of opioid use with gastroparesis, attempting to transition to po analgesics   Continue IV Flagyl  Protonix, Pepcid, Maalox  Advance diet as able  Zofran as needed  Aqua K-pad/heat pack for abdomen

## 2025-03-18 NOTE — ASSESSMENT & PLAN NOTE
Patient with 3 days of dark/burgundy colored loose stools  history of gastroparesis and diverticulosis seen on scope in 2021  CT A/P on admission noted diverticula, diffuse colonic thickening which may represent mild inflammation or colitis  Likely related to colitis which could be infectious, inflammatory or ischemic versus bleeding diverticula  Hemoglobin stable at 12 though this is lower than his baseline in the past which has been 13-14  Possibly hemodilution given IVF- monitor off IVF   FOBT positive  Follow-up C. difficile and stool enteric panel  Continue IV Flagyl  Stool record at bedside  GI consult- possible c-scope inpatient if stool studies negative and diagnosis unclear, otherwise GI follow up in 6-8 weeks

## 2025-03-18 NOTE — PROGRESS NOTES
Progress Note - Gastroenterology   Name: Brian Butterfield 59 y.o. male I MRN: [id number]  Unit/Bed#: W -01 I Date of Admission: 3/15/2025   Date of Service: 3/18/2025 I Hospital Day: 2    Assessment & Plan  Diarrhea of presumed infectious origin  -sudden onset of abdominal pain Friday, diarrhea Saturday, had some vomiting prior to admission. Suspect infectious etiology. Afebrile. WBC count and electrolytes normal. Ct concerned for thickened colon versus under distention. Still with mushy stools, c diff negative, stool PCR pending  -continue clears, NPO after midnight  -patient reports continued generalized abdominal pain with no improvement  -if stool PCR negative, will likely plan for colonoscopy with bowel prep for refractory symptoms. Can consider EGD alone tomorrow if pain persisting and hgb continues to drop, otherwise can do EGD and colonoscopy on Thursday   Intractable abdominal pain  -related to above  -bentyl for cramping   Dark stools  -likely secondary to infection, hgb 12.4>11.8  -consider EGD/colonoscopy as above  -monitor hgb         Subjective   Still with generalized abdominal pain, vomited small amount of phlegm/bile. Had a BM today that was mushy.     Objective :  Temp:  [98.6 °F (37 °C)-98.9 °F (37.2 °C)] 98.6 °F (37 °C)  HR:  [55-69] 67  BP: (129-146)/(66-75) 146/75  SpO2:  [95 %-97 %] 97 %    Physical Exam  Constitutional:       General: He is not in acute distress.     Appearance: Normal appearance. He is well-developed.   HENT:      Head: Normocephalic and atraumatic.      Mouth/Throat:      Mouth: Mucous membranes are moist.   Eyes:      General: No scleral icterus.  Neck:      Trachea: No tracheal deviation.   Cardiovascular:      Rate and Rhythm: Normal rate and regular rhythm.      Heart sounds: Normal heart sounds. No murmur heard.  Pulmonary:      Effort: Pulmonary effort is normal. No respiratory distress.      Breath sounds: Normal breath sounds. No wheezing or rales.   Chest:       Chest wall: No tenderness.   Abdominal:      General: Bowel sounds are normal. There is no distension.      Palpations: Abdomen is soft. There is no mass.      Tenderness: There is generalized abdominal tenderness. There is no guarding or rebound.   Musculoskeletal:         General: Normal range of motion.      Cervical back: Normal range of motion and neck supple.   Skin:     General: Skin is warm and dry.      Coloration: Skin is not pale.      Findings: No rash.   Neurological:      Mental Status: He is alert and oriented to person, place, and time. Mental status is at baseline.   Psychiatric:         Mood and Affect: Mood normal.         Behavior: Behavior normal.           Lab Results: I have reviewed the following results:

## 2025-03-18 NOTE — PROGRESS NOTES
Progress Note - Hospitalist   Name: Brian Butterfield 59 y.o. male I MRN: [id number]  Unit/Bed#: W -01 I Date of Admission: 3/15/2025   Date of Service: 3/18/2025 I Hospital Day: 2    Assessment & Plan  Intractable abdominal pain  59-year-old male with history of type 1 diabetes, coronary artery disease who initially presented with significant generalized abdominal pain and inability to tolerate p.o. intake  H/o gastroparesis   Suspect likely secondary to possible colitis as noted on CT scan to have mild colitis presented with 1 day of diarrhea/dark stools  No tolerating about 25% of meals, all liquids- DC IVF   GI consult given associated dark stools to r/o bleed   Continue IV pain control, prn bentyl - educated patient on effects of opioid use with gastroparesis, attempting to transition to po analgesics   Continue IV Flagyl  Protonix, Pepcid, Maalox  Advance diet as able  Zofran as needed  Aqua K-pad/heat pack for abdomen  Dark stools  Patient with 3 days of dark/burgundy colored loose stools  history of gastroparesis and diverticulosis seen on scope in 2021  CT A/P on admission noted diverticula, diffuse colonic thickening which may represent mild inflammation or colitis  Likely related to colitis which could be infectious, inflammatory or ischemic versus bleeding diverticula  Hemoglobin stable at 12 though this is lower than his baseline in the past which has been 13-14  Possibly hemodilution given IVF- monitor off IVF   FOBT positive  Follow-up C. difficile and stool enteric panel  Continue IV Flagyl  Stool record at bedside  GI consult- possible c-scope inpatient if stool studies negative and diagnosis unclear, otherwise GI follow up in 6-8 weeks   COPD (chronic obstructive pulmonary disease) (HCC)  Not in acute exacerbation  Continue home inhalers  CAD S/P percutaneous coronary angioplasty  History of coronary artery disease status post PCI 2020 and 2021  Continue aspirin, Plavix   Diabetic  polyneuropathy associated with type 1 diabetes mellitus (HCC)    Lab Results   Component Value Date    HGBA1C 8.2 (H) 03/15/2025   Continue home insulin pump    VTE Pharmacologic Prophylaxis: VTE Score: 5 High Risk (Score >/= 5) - Pharmacological DVT Prophylaxis Ordered: heparin. Sequential Compression Devices Ordered.    Mobility:   Basic Mobility Inpatient Raw Score: 24  JH-HLM Goal: 8: Walk 250 feet or more  JH-HLM Achieved: 8: Walk 250 feet ot more  JH-HLM Goal achieved. Continue to encourage appropriate mobility.    Patient Centered Rounds: I performed bedside rounds with nursing staff today.   Discussions with Specialists or Other Care Team Provider: GI, CM    Education and Discussions with Family / Patient: Patient declined call to .     Current Length of Stay: 2 day(s)  Current Patient Status: Inpatient   Certification Statement: The patient will continue to require additional inpatient hospital stay due to pending stool studies   Discharge Plan: Anticipate discharge in 24-48 hrs to home.    Code Status: Level 1 - Full Code    Subjective   Patient c/o central abdominal cramping, burning, pain. Associated dyspepsia and reflux. Denies any further BM's. Does still report intermittent fever and chills.     Objective :  Temp:  [98.6 °F (37 °C)-98.9 °F (37.2 °C)] 98.6 °F (37 °C)  HR:  [55-69] 67  BP: (129-146)/(66-75) 146/75  SpO2:  [95 %-97 %] 97 %    Body mass index is 27.26 kg/m².     Input and Output Summary (last 24 hours):     Intake/Output Summary (Last 24 hours) at 3/18/2025 0911  Last data filed at 3/17/2025 1922  Gross per 24 hour   Intake 1590 ml   Output --   Net 1590 ml       Physical Exam  Vitals reviewed.   Constitutional:       General: He is not in acute distress.     Appearance: Normal appearance. He is not toxic-appearing or diaphoretic.      Comments: Patient intermittently gagging, with an episode of emesis    HENT:      Mouth/Throat:      Mouth: Mucous membranes are moist.    Cardiovascular:      Rate and Rhythm: Normal rate and regular rhythm.      Heart sounds: Normal heart sounds.   Pulmonary:      Effort: Pulmonary effort is normal.      Breath sounds: Normal breath sounds. No rales.   Abdominal:      General: Bowel sounds are normal. There is no distension.      Palpations: Abdomen is soft.      Tenderness: There is abdominal tenderness (epigastric). There is no guarding or rebound.   Musculoskeletal:      Right lower leg: No edema.      Left lower leg: No edema.   Skin:     General: Skin is warm and dry.   Neurological:      Mental Status: He is alert and oriented to person, place, and time.           Lines/Drains:              Lab Results: I have reviewed the following results:   Results from last 7 days   Lab Units 03/18/25  0543 03/17/25  0904 03/16/25  1018   WBC Thousand/uL 6.81   < > 9.52   HEMOGLOBIN g/dL 11.8*   < > 12.7   HEMATOCRIT % 34.2*   < > 37.8   PLATELETS Thousands/uL 176   < > 194   SEGS PCT %  --   --  72   LYMPHO PCT %  --   --  20   MONO PCT %  --   --  8   EOS PCT %  --   --  0    < > = values in this interval not displayed.     Results from last 7 days   Lab Units 03/18/25  0543 03/16/25  1018 03/15/25  1555   SODIUM mmol/L 137   < > 141   POTASSIUM mmol/L 3.7   < > 3.5   CHLORIDE mmol/L 104   < > 100   CO2 mmol/L 29   < > 30   BUN mg/dL 10   < > 14   CREATININE mg/dL 0.87   < > 0.92   ANION GAP mmol/L 4   < > 11   CALCIUM mg/dL 8.2*   < > 9.5   ALBUMIN g/dL  --   --  4.4   TOTAL BILIRUBIN mg/dL  --   --  0.60   ALK PHOS U/L  --   --  72   ALT U/L  --   --  14   AST U/L  --   --  30   GLUCOSE RANDOM mg/dL 166*   < > 120    < > = values in this interval not displayed.         Results from last 7 days   Lab Units 03/18/25  0726 03/17/25  2148 03/17/25  1557 03/17/25  1112 03/17/25  0719 03/16/25  2119 03/16/25  1706 03/16/25  1634 03/16/25  1612 03/16/25  1147 03/16/25  1111 03/16/25  0705   POC GLUCOSE mg/dl 112 77 222* 239* 70 54* 157* 71 69 83 72 121      Results from last 7 days   Lab Units 03/15/25  1942   HEMOGLOBIN A1C % 8.2*           Recent Cultures (last 7 days):               Last 24 Hours Medication List:     Current Facility-Administered Medications:     acetaminophen (TYLENOL) tablet 650 mg, Q6H PRN    aluminum-magnesium hydroxide-simethicone (MAALOX) oral suspension 30 mL, Q4H PRN    aspirin chewable tablet 81 mg, Daily    clopidogrel (PLAVIX) tablet 75 mg, Daily    dicyclomine (BENTYL) capsule 10 mg, 4x Daily (AC & HS)    famotidine (PEPCID) tablet 20 mg, BID    heparin (porcine) subcutaneous injection 5,000 Units, Q8H GUEVARA    HYDROmorphone (DILAUDID) injection 0.5 mg, Q4H PRN    HYDROmorphone (DILAUDID) injection 1 mg, Q4H PRN **OR** morphine injection 2 mg, Q4H PRN    insulin aspart (NovoLOG) FOR PUMP REFILLS 1 Units, Daily PRN    insulin lispro (HumALOG/ADMELOG) 100 units/mL subcutaneous injection 1-5 Units, TID AC **AND** Fingerstick Glucose (POCT), TID AC    insulin lispro (HumALOG/ADMELOG) 100 units/mL subcutaneous injection 1-5 Units, HS    levothyroxine tablet 112 mcg, Early Morning    lisinopril (ZESTRIL) tablet 2.5 mg, Daily    metroNIDAZOLE (FLAGYL) IVPB (premix) 500 mg 100 mL, Q8H, Last Rate: 500 mg (03/18/25 0425)    ondansetron (ZOFRAN) injection 4 mg, Q6H PRN    pantoprazole (PROTONIX) injection 40 mg, Q24H [facility]    PATIENT MAINTAINED INSULIN PUMP 1 each, Q8H    sodium chloride 0.9 % infusion, Continuous, Last Rate: 75 mL/hr (03/17/25 2128)    umeclidinium 62.5 mcg/actuation inhaler AEPB 1 puff, Daily    Administrative Statements   Today, Patient Was Seen By: Shannon Joyce PA-C      **Please Note: This note may have been constructed using a voice recognition system.**

## 2025-03-18 NOTE — ASSESSMENT & PLAN NOTE
Lab Results   Component Value Date    HGBA1C 8.2 (H) 03/15/2025       Recent Labs     03/17/25  1112 03/17/25  1557 03/17/25  2148 03/18/25  0726   POCGLU 239* 222* 77 112       Blood Sugar Average: Last 72 hrs:  (P) [id number]

## 2025-03-19 PROBLEM — R07.89 OTHER CHEST PAIN: Status: ACTIVE | Noted: 2025-03-19

## 2025-03-19 LAB
2HR DELTA HS TROPONIN: -1 NG/L
4HR DELTA HS TROPONIN: 0 NG/L
ANION GAP SERPL CALCULATED.3IONS-SCNC: 17 MMOL/L (ref 4–13)
ATRIAL RATE: 61 BPM
ATRIAL RATE: 87 BPM
ATRIAL RATE: 89 BPM
BUN SERPL-MCNC: 13 MG/DL (ref 5–25)
C COLI+JEJUNI TUF STL QL NAA+PROBE: NEGATIVE
CALCIUM SERPL-MCNC: 9.5 MG/DL (ref 8.4–10.2)
CARDIAC TROPONIN I PNL SERPL HS: 7 NG/L (ref ?–50)
CARDIAC TROPONIN I PNL SERPL HS: 8 NG/L (ref ?–50)
CARDIAC TROPONIN I PNL SERPL HS: 8 NG/L (ref ?–50)
CHLORIDE SERPL-SCNC: 96 MMOL/L (ref 96–108)
CO2 SERPL-SCNC: 21 MMOL/L (ref 21–32)
CREAT SERPL-MCNC: 1.01 MG/DL (ref 0.6–1.3)
EC STX1+STX2 GENES STL QL NAA+PROBE: NEGATIVE
ERYTHROCYTE [DISTWIDTH] IN BLOOD BY AUTOMATED COUNT: 12.5 % (ref 11.6–15.1)
GFR SERPL CREATININE-BSD FRML MDRD: 82 ML/MIN/1.73SQ M
GLUCOSE SERPL-MCNC: 112 MG/DL (ref 65–140)
GLUCOSE SERPL-MCNC: 178 MG/DL (ref 65–140)
GLUCOSE SERPL-MCNC: 191 MG/DL (ref 65–140)
GLUCOSE SERPL-MCNC: 221 MG/DL (ref 65–140)
GLUCOSE SERPL-MCNC: 238 MG/DL (ref 65–140)
GLUCOSE SERPL-MCNC: 268 MG/DL (ref 65–140)
GLUCOSE SERPL-MCNC: 270 MG/DL (ref 65–140)
GLUCOSE SERPL-MCNC: 276 MG/DL (ref 65–140)
HCT VFR BLD AUTO: 40.8 % (ref 36.5–49.3)
HGB BLD-MCNC: 13.7 G/DL (ref 12–17)
MCH RBC QN AUTO: 31.9 PG (ref 26.8–34.3)
MCHC RBC AUTO-ENTMCNC: 33.6 G/DL (ref 31.4–37.4)
MCV RBC AUTO: 95 FL (ref 82–98)
P AXIS: 17 DEGREES
P AXIS: 73 DEGREES
P AXIS: 73 DEGREES
PLATELET # BLD AUTO: 212 THOUSANDS/UL (ref 149–390)
PMV BLD AUTO: 10.6 FL (ref 8.9–12.7)
POTASSIUM SERPL-SCNC: 4 MMOL/L (ref 3.5–5.3)
PR INTERVAL: 124 MS
PR INTERVAL: 134 MS
PR INTERVAL: 140 MS
QRS AXIS: -51 DEGREES
QRS AXIS: -70 DEGREES
QRS AXIS: -71 DEGREES
QRSD INTERVAL: 104 MS
QRSD INTERVAL: 94 MS
QRSD INTERVAL: 96 MS
QT INTERVAL: 358 MS
QT INTERVAL: 368 MS
QT INTERVAL: 404 MS
QTC INTERVAL: 406 MS
QTC INTERVAL: 436 MS
QTC INTERVAL: 443 MS
RBC # BLD AUTO: 4.3 MILLION/UL (ref 3.88–5.62)
SALMONELLA SP SPAO STL QL NAA+PROBE: NEGATIVE
SHIGELLA SP+EIEC IPAH STL QL NAA+PROBE: NEGATIVE
SODIUM SERPL-SCNC: 134 MMOL/L (ref 135–147)
T WAVE AXIS: 45 DEGREES
T WAVE AXIS: 57 DEGREES
T WAVE AXIS: 57 DEGREES
VENTRICULAR RATE: 61 BPM
VENTRICULAR RATE: 87 BPM
VENTRICULAR RATE: 89 BPM
WBC # BLD AUTO: 9.08 THOUSAND/UL (ref 4.31–10.16)

## 2025-03-19 PROCEDURE — 93010 ELECTROCARDIOGRAM REPORT: CPT | Performed by: INTERNAL MEDICINE

## 2025-03-19 PROCEDURE — 82948 REAGENT STRIP/BLOOD GLUCOSE: CPT

## 2025-03-19 PROCEDURE — 99232 SBSQ HOSP IP/OBS MODERATE 35: CPT | Performed by: STUDENT IN AN ORGANIZED HEALTH CARE EDUCATION/TRAINING PROGRAM

## 2025-03-19 PROCEDURE — 84484 ASSAY OF TROPONIN QUANT: CPT | Performed by: NURSE PRACTITIONER

## 2025-03-19 PROCEDURE — 93005 ELECTROCARDIOGRAM TRACING: CPT

## 2025-03-19 PROCEDURE — 85027 COMPLETE CBC AUTOMATED: CPT

## 2025-03-19 PROCEDURE — 99232 SBSQ HOSP IP/OBS MODERATE 35: CPT

## 2025-03-19 PROCEDURE — 80048 BASIC METABOLIC PNL TOTAL CA: CPT

## 2025-03-19 RX ORDER — SODIUM CHLORIDE, SODIUM LACTATE, POTASSIUM CHLORIDE, CALCIUM CHLORIDE 600; 310; 30; 20 MG/100ML; MG/100ML; MG/100ML; MG/100ML
125 INJECTION, SOLUTION INTRAVENOUS CONTINUOUS
Status: CANCELLED | OUTPATIENT
Start: 2025-03-19

## 2025-03-19 RX ORDER — SODIUM CHLORIDE 9 MG/ML
75 INJECTION, SOLUTION INTRAVENOUS CONTINUOUS
Status: DISCONTINUED | OUTPATIENT
Start: 2025-03-20 | End: 2025-03-21

## 2025-03-19 RX ORDER — HYDROMORPHONE HCL/PF 1 MG/ML
0.5 SYRINGE (ML) INJECTION ONCE
Status: COMPLETED | OUTPATIENT
Start: 2025-03-19 | End: 2025-03-19

## 2025-03-19 RX ORDER — METOCLOPRAMIDE HYDROCHLORIDE 5 MG/ML
10 INJECTION INTRAMUSCULAR; INTRAVENOUS ONCE
Status: COMPLETED | OUTPATIENT
Start: 2025-03-19 | End: 2025-03-19

## 2025-03-19 RX ORDER — POLYETHYLENE GLYCOL 3350 17 G/17G
238 POWDER, FOR SOLUTION ORAL ONCE
Status: COMPLETED | OUTPATIENT
Start: 2025-03-19 | End: 2025-03-19

## 2025-03-19 RX ORDER — BISACODYL 5 MG/1
10 TABLET, DELAYED RELEASE ORAL ONCE
Status: COMPLETED | OUTPATIENT
Start: 2025-03-19 | End: 2025-03-19

## 2025-03-19 RX ADMIN — METRONIDAZOLE 500 MG: 500 INJECTION, SOLUTION INTRAVENOUS at 11:03

## 2025-03-19 RX ADMIN — HYDROMORPHONE HYDROCHLORIDE 1 MG: 1 INJECTION, SOLUTION INTRAMUSCULAR; INTRAVENOUS; SUBCUTANEOUS at 11:02

## 2025-03-19 RX ADMIN — DICYCLOMINE HYDROCHLORIDE 10 MG: 10 CAPSULE ORAL at 21:43

## 2025-03-19 RX ADMIN — POLYETHYLENE GLYCOL 3350 238 G: 17 POWDER, FOR SOLUTION ORAL at 15:46

## 2025-03-19 RX ADMIN — FAMOTIDINE 20 MG: 20 TABLET, FILM COATED ORAL at 17:25

## 2025-03-19 RX ADMIN — CLOPIDOGREL BISULFATE 75 MG: 75 TABLET ORAL at 08:51

## 2025-03-19 RX ADMIN — HEPARIN SODIUM 5000 UNITS: 5000 INJECTION INTRAVENOUS; SUBCUTANEOUS at 21:43

## 2025-03-19 RX ADMIN — LEVOTHYROXINE SODIUM 112 MCG: 112 TABLET ORAL at 06:07

## 2025-03-19 RX ADMIN — ASPIRIN 81 MG CHEWABLE TABLET 81 MG: 81 TABLET CHEWABLE at 08:51

## 2025-03-19 RX ADMIN — LISINOPRIL 2.5 MG: 2.5 TABLET ORAL at 08:50

## 2025-03-19 RX ADMIN — ONDANSETRON 4 MG: 2 INJECTION INTRAMUSCULAR; INTRAVENOUS at 20:10

## 2025-03-19 RX ADMIN — HEPARIN SODIUM 5000 UNITS: 5000 INJECTION INTRAVENOUS; SUBCUTANEOUS at 14:19

## 2025-03-19 RX ADMIN — UMECLIDINIUM 1 PUFF: 62.5 AEROSOL, POWDER ORAL at 04:07

## 2025-03-19 RX ADMIN — HEPARIN SODIUM 5000 UNITS: 5000 INJECTION INTRAVENOUS; SUBCUTANEOUS at 06:07

## 2025-03-19 RX ADMIN — METOCLOPRAMIDE 10 MG: 5 INJECTION, SOLUTION INTRAMUSCULAR; INTRAVENOUS at 06:07

## 2025-03-19 RX ADMIN — METRONIDAZOLE 500 MG: 500 INJECTION, SOLUTION INTRAVENOUS at 04:08

## 2025-03-19 RX ADMIN — HYDROMORPHONE HYDROCHLORIDE 1 MG: 1 INJECTION, SOLUTION INTRAMUSCULAR; INTRAVENOUS; SUBCUTANEOUS at 15:30

## 2025-03-19 RX ADMIN — HYDROMORPHONE HYDROCHLORIDE 1 MG: 1 INJECTION, SOLUTION INTRAMUSCULAR; INTRAVENOUS; SUBCUTANEOUS at 06:32

## 2025-03-19 RX ADMIN — PANTOPRAZOLE SODIUM 40 MG: 40 INJECTION, POWDER, LYOPHILIZED, FOR SOLUTION INTRAVENOUS at 08:51

## 2025-03-19 RX ADMIN — DICYCLOMINE HYDROCHLORIDE 10 MG: 10 CAPSULE ORAL at 09:31

## 2025-03-19 RX ADMIN — DICYCLOMINE HYDROCHLORIDE 10 MG: 10 CAPSULE ORAL at 15:30

## 2025-03-19 RX ADMIN — MORPHINE SULFATE 2 MG: 2 INJECTION, SOLUTION INTRAMUSCULAR; INTRAVENOUS at 02:11

## 2025-03-19 RX ADMIN — BISACODYL 10 MG: 5 TABLET, COATED ORAL at 14:19

## 2025-03-19 RX ADMIN — HYDROMORPHONE HYDROCHLORIDE 1 MG: 1 INJECTION, SOLUTION INTRAMUSCULAR; INTRAVENOUS; SUBCUTANEOUS at 20:01

## 2025-03-19 RX ADMIN — FAMOTIDINE 20 MG: 20 TABLET, FILM COATED ORAL at 08:51

## 2025-03-19 RX ADMIN — DICYCLOMINE HYDROCHLORIDE 10 MG: 10 CAPSULE ORAL at 11:03

## 2025-03-19 RX ADMIN — UMECLIDINIUM 1 PUFF: 62.5 AEROSOL, POWDER ORAL at 09:31

## 2025-03-19 RX ADMIN — HYDROMORPHONE HYDROCHLORIDE 0.5 MG: 1 INJECTION, SOLUTION INTRAMUSCULAR; INTRAVENOUS; SUBCUTANEOUS at 04:33

## 2025-03-19 RX ADMIN — ONDANSETRON 4 MG: 2 INJECTION INTRAMUSCULAR; INTRAVENOUS at 04:16

## 2025-03-19 NOTE — PLAN OF CARE

## 2025-03-19 NOTE — PROGRESS NOTES
Progress Note - Gastroenterology   Name: Biran Butterfield 59 y.o. male I MRN: [id number]  Unit/Bed#: ICU 12 I Date of Admission: 3/15/2025   Date of Service: 3/19/2025 I Hospital Day: 3    Assessment & Plan  Diarrhea of presumed infectious origin  -sudden onset of abdominal pain Friday, diarrhea Saturday, had some vomiting prior to admission. Suspect infectious etiology. Afebrile. WBC count and electrolytes normal. Ct concerned for thickened colon versus under distention. Still with mushy stools, c diff negative, stool PCR negative. No diarrhea for last 24 hours but still with abdominal pain and chest pain   -clears, NPO after midnight  -plan for EGD and colonoscopy tomorrow to assess  -dulcolax and miralax bowel prep today  -anti-emetics as needed   Intractable abdominal pain  -as above  -bentyl for cramping   Dark stools  -likely secondary to infection, hgb 12.4>11.8>13.7  -EGD/colonoscopy as above  -monitor hgb, no obvious signs of blood loss at this time   Other chest pain  -unclear cause. Troponins normal.  -EGD as above  -PPI        Subjective   Patient had chest pain overnight. Also continues to have abdominal pain all over. Reports no Bm for last 24 hours. Last one was mushy. Continues to have intermittent vomiting.     Objective :  Temp:  [98.3 °F (36.8 °C)-98.9 °F (37.2 °C)] 98.6 °F (37 °C)  HR:  [59-95] 83  BP: (110-153)/(55-70) 110/55  Resp:  [15-17] 15  SpO2:  [94 %-98 %] 98 %  O2 Device: None (Room air)    Physical Exam  Constitutional:       General: He is not in acute distress.     Appearance: Normal appearance. He is well-developed.   HENT:      Head: Normocephalic and atraumatic.      Mouth/Throat:      Mouth: Mucous membranes are moist.   Eyes:      General: No scleral icterus.  Neck:      Trachea: No tracheal deviation.   Cardiovascular:      Rate and Rhythm: Normal rate and regular rhythm.      Heart sounds: Normal heart sounds. No murmur heard.  Pulmonary:      Effort: Pulmonary effort is  normal. No respiratory distress.      Breath sounds: Normal breath sounds. No wheezing or rales.   Chest:      Chest wall: No tenderness.   Abdominal:      General: Bowel sounds are normal. There is no distension.      Palpations: Abdomen is soft. There is no mass.      Tenderness: There is generalized abdominal tenderness. There is no guarding or rebound.   Musculoskeletal:         General: Normal range of motion.      Cervical back: Normal range of motion and neck supple.   Skin:     General: Skin is warm and dry.      Coloration: Skin is not pale.      Findings: No rash.   Neurological:      Mental Status: He is alert and oriented to person, place, and time. Mental status is at baseline.   Psychiatric:         Mood and Affect: Mood normal.         Behavior: Behavior normal.           Lab Results: I have reviewed the following results:

## 2025-03-19 NOTE — PROGRESS NOTES
Progress Note - Hospitalist   Name: Brian Butterfield 59 y.o. male I MRN: [id number]  Unit/Bed#: ICU 12 I Date of Admission: 3/15/2025   Date of Service: 3/19/2025 I Hospital Day: 3    Assessment & Plan  Intractable abdominal pain  59-year-old male with history of type 1 diabetes, coronary artery disease who initially presented with significant generalized abdominal pain and inability to tolerate p.o. intake  H/o gastroparesis   Suspect likely secondary to possible colitis as noted on CT scan to have mild colitis presented with 1 day of diarrhea/dark stools  No tolerating about 25% of meals, all liquids- DC IVF   GI consult, plan for EGD tomorrow- NPO at midnight, IVF, q6hr BG monitoring  May need to add IVF w/ dextrose if BG too low while on insulin pump   Continue IV pain control, prn bentyl - educated patient on effects of opioid use with gastroparesis, attempting to transition to po analgesics   Continue IV Flagyl, will d/c if stool enteric panel is negative  Protonix, Pepcid, Maalox  Advance diet as able  Zofran as needed  Aqua K-pad/heat pack for abdomen  Dark stools  Patient with 3 days of dark/burgundy colored loose stools  history of gastroparesis and diverticulosis seen on scope in 2021  CT A/P on admission noted diverticula, diffuse colonic thickening which may represent mild inflammation or colitis  Likely related to colitis which could be infectious, inflammatory or ischemic versus bleeding diverticula  Hemoglobin stable at 12 though this is lower than his baseline in the past which has been 13-14  Possibly hemodilution given IVF-now improved off of IVF  FOBT positive  C. difficile negative  Follow-up stool enteric panel  Continue IV Flagyl until stool enteric panel results   Stool record at bedside  GI consult-plan for colonoscopy tomorrow  COPD (chronic obstructive pulmonary disease) (HCC)  Not in acute exacerbation  Continue home inhalers  CAD S/P percutaneous coronary angioplasty  History of  coronary artery disease status post PCI 2020 and 2021  Continue aspirin, Plavix   Diabetic polyneuropathy associated with type 1 diabetes mellitus (HCC)    Lab Results   Component Value Date    HGBA1C 8.2 (H) 03/15/2025   Sugars elevated overnight in the higher 200s, per nursing his sensor is reading low sugar so he is not dosing his insulin appropriately.  Anion gap of 17 on morning labs  Patient to adjust pump according to fingersticks, if this is not effective we will discontinue pump and place on insulin drip.  Continue home insulin pump for now  Q6h BG checks  NPO at midnight, may need IVF w/ dextrose if BG are low   Hypoglycemia protocol   Other chest pain  3/18 with complaints of CP overnight, center of chest  Likely GI in etiology as patient was vomiting before and after  Currently CP free   Troponins 8 -> 7  EKG unchanged from prior, no TWI or ST segment changes  Discontinue telemetry  Stat repeat EKG for recurrent chest pain    VTE Pharmacologic Prophylaxis: VTE Score: 5 High Risk (Score >/= 5) - Pharmacological DVT Prophylaxis Ordered: heparin. Sequential Compression Devices Ordered.    Mobility:   Basic Mobility Inpatient Raw Score: 24  JH-HLM Goal: 8: Walk 250 feet or more  JH-HLM Achieved: 5: Stand (1 or more minutes)  JH-HLM Goal NOT achieved. Continue with multidisciplinary rounding and encourage appropriate mobility to improve upon JH-HLM goals.    Patient Centered Rounds: I performed bedside rounds with nursing staff today.   Discussions with Specialists or Other Care Team Provider: GI    Education and Discussions with Family / Patient: Patient declined call to .     Current Length of Stay: 3 day(s)  Current Patient Status: Inpatient   Certification Statement: The patient will continue to require additional inpatient hospital stay due to pending colonoscopy and EGD  Discharge Plan: Anticipate discharge in 24-48 hrs to home.    Code Status: Level 1 - Full Code    Subjective   Patient  notes frequent vomiting overnight with bile.  No hematemesis.  His chest pain has resolved since the retching has stopped.  Otherwise is still having some mild abdominal cramping and epigastric abdominal pain.  Did have a burgundy bowel movement yesterday.  Denies any fever, lightheadedness or dizziness.  I did discuss the inaccuracy of his glucose monitoring and he will be adjusting his insulin pump per our fingersticks.    Objective :  Temp:  [98.3 °F (36.8 °C)-98.9 °F (37.2 °C)] 98.6 °F (37 °C)  HR:  [59-95] 83  BP: (110-153)/(55-70) 110/55  Resp:  [15-17] 15  SpO2:  [94 %-98 %] 98 %  O2 Device: None (Room air)    Body mass index is 27.26 kg/m².     Input and Output Summary (last 24 hours):     Intake/Output Summary (Last 24 hours) at 3/19/2025 1007  Last data filed at 3/19/2025 0934  Gross per 24 hour   Intake 1900 ml   Output 425 ml   Net 1475 ml       Physical Exam  Vitals reviewed.   Constitutional:       General: He is not in acute distress.     Appearance: Normal appearance. He is not ill-appearing, toxic-appearing or diaphoretic.   HENT:      Mouth/Throat:      Mouth: Mucous membranes are moist.   Cardiovascular:      Rate and Rhythm: Normal rate and regular rhythm.      Heart sounds: Normal heart sounds.   Pulmonary:      Effort: Pulmonary effort is normal.      Breath sounds: Normal breath sounds.   Abdominal:      General: Abdomen is flat. Bowel sounds are normal. There is no distension.      Palpations: Abdomen is soft.      Tenderness: There is abdominal tenderness (epigastric).   Musculoskeletal:      Right lower leg: No edema.      Left lower leg: No edema.   Skin:     General: Skin is warm and dry.   Neurological:      Mental Status: He is alert and oriented to person, place, and time.           Lines/Drains:        Telemetry:  Telemetry Orders (From admission, onward)               24 Hour Telemetry Monitoring  Continuous x 24 Hours (Telem)        Expiring   Question:  Reason for 24 Hour Telemetry   Answer:  PCI/EP study (including pacer and ICD implementation), Cardiac surgery, MI, abnormal cardiac cath, and chest pain- rule out MI                     Telemetry Reviewed: Sinus Bradycardia  Indication for Continued Telemetry Use: Acute MI/Unstable Angina/Rule out ACS               Lab Results: I have reviewed the following results:   Results from last 7 days   Lab Units 03/19/25  0423 03/17/25  0904 03/16/25  1018   WBC Thousand/uL 9.08   < > 9.52   HEMOGLOBIN g/dL 13.7   < > 12.7   HEMATOCRIT % 40.8   < > 37.8   PLATELETS Thousands/uL 212   < > 194   SEGS PCT %  --   --  72   LYMPHO PCT %  --   --  20   MONO PCT %  --   --  8   EOS PCT %  --   --  0    < > = values in this interval not displayed.     Results from last 7 days   Lab Units 03/19/25  0423 03/16/25  1018 03/15/25  1555   SODIUM mmol/L 134*   < > 141   POTASSIUM mmol/L 4.0   < > 3.5   CHLORIDE mmol/L 96   < > 100   CO2 mmol/L 21   < > 30   BUN mg/dL 13   < > 14   CREATININE mg/dL 1.01   < > 0.92   ANION GAP mmol/L 17*   < > 11   CALCIUM mg/dL 9.5   < > 9.5   ALBUMIN g/dL  --   --  4.4   TOTAL BILIRUBIN mg/dL  --   --  0.60   ALK PHOS U/L  --   --  72   ALT U/L  --   --  14   AST U/L  --   --  30   GLUCOSE RANDOM mg/dL 268*   < > 120    < > = values in this interval not displayed.         Results from last 7 days   Lab Units 03/19/25  0720 03/19/25  0554 03/19/25  0344 03/19/25  0207 03/18/25  2123 03/18/25  1558 03/18/25  1059 03/18/25  0726 03/17/25  2148 03/17/25  1557 03/17/25  1112 03/17/25  0719   POC GLUCOSE mg/dl 276* 270* 221* 191* 211* 183* 174* 112 77 222* 239* 70     Results from last 7 days   Lab Units 03/15/25  1942   HEMOGLOBIN A1C % 8.2*           Recent Cultures (last 7 days):   Results from last 7 days   Lab Units 03/17/25  0855   C DIFF TOXIN B BY PCR  Negative             Last 24 Hours Medication List:     Current Facility-Administered Medications:     acetaminophen (TYLENOL) tablet 650 mg, Q6H PRN    aluminum-magnesium  hydroxide-simethicone (MAALOX) oral suspension 30 mL, Q4H PRN    aspirin chewable tablet 81 mg, Daily    clopidogrel (PLAVIX) tablet 75 mg, Daily    dicyclomine (BENTYL) capsule 10 mg, 4x Daily (AC & HS)    famotidine (PEPCID) tablet 20 mg, BID    heparin (porcine) subcutaneous injection 5,000 Units, Q8H GUEVARA    HYDROmorphone (DILAUDID) injection 1 mg, Q4H PRN    insulin aspart (NovoLOG) FOR PUMP REFILLS 1 Units, Daily PRN    insulin lispro (HumALOG/ADMELOG) 100 units/mL subcutaneous injection 1-5 Units, TID AC **AND** Fingerstick Glucose (POCT), TID AC    insulin lispro (HumALOG/ADMELOG) 100 units/mL subcutaneous injection 1-5 Units, HS    levothyroxine tablet 112 mcg, Early Morning    lisinopril (ZESTRIL) tablet 2.5 mg, Daily    metroNIDAZOLE (FLAGYL) IVPB (premix) 500 mg 100 mL, Q8H, Last Rate: 500 mg (03/19/25 0408)    morphine injection 2 mg, Q4H PRN    ondansetron (ZOFRAN) injection 4 mg, Q6H PRN    pantoprazole (PROTONIX) injection 40 mg, Q24H GUEVARA    PATIENT MAINTAINED INSULIN PUMP 1 each, Q8H    polyethylene glycol (MIRALAX) packet 17 g, Daily    umeclidinium 62.5 mcg/actuation inhaler AEPB 1 puff, Daily    Administrative Statements   Today, Patient Was Seen By: Shannon Joyce PA-C      **Please Note: This note may have been constructed using a voice recognition system.**

## 2025-03-19 NOTE — ASSESSMENT & PLAN NOTE
-likely secondary to infection, hgb 12.4>11.8>13.7  -EGD/colonoscopy as above  -monitor hgb, no obvious signs of blood loss at this time

## 2025-03-19 NOTE — ASSESSMENT & PLAN NOTE
Lab Results   Component Value Date    HGBA1C 8.2 (H) 03/15/2025   Sugars elevated overnight in the higher 200s, per nursing his sensor is reading low sugar so he is not dosing his insulin appropriately.  Anion gap of 17 on morning labs  Patient to adjust pump according to fingersticks, if this is not effective we will discontinue pump and place on insulin drip.  Continue home insulin pump for now  Q6h BG checks  NPO at midnight, may need IVF w/ dextrose if BG are low   Hypoglycemia protocol

## 2025-03-19 NOTE — PLAN OF CARE
Problem: Potential for Falls  Goal: Patient will remain free of falls  Description: INTERVENTIONS:  - Educate patient/family on patient safety including physical limitations  - Instruct patient to call for assistance with activity   - Consult OT/PT to assist with strengthening/mobility   - Keep Call bell within reach  - Keep bed low and locked with side rails adjusted as appropriate  - Keep care items and personal belongings within reach  - Initiate and maintain comfort rounds  - Make Fall Risk Sign visible to staff  - Offer Toileting every  Hours, in advance of need  - Initiate/Maintain alarm  - Obtain necessary fall risk management equipment:   - Apply yellow socks and bracelet for high fall risk patients  - Consider moving patient to room near nurses station  Outcome: Progressing     Problem: PAIN - ADULT  Goal: Verbalizes/displays adequate comfort level or baseline comfort level  Description: Interventions:  - Encourage patient to monitor pain and request assistance  - Assess pain using appropriate pain scale  - Administer analgesics based on type and severity of pain and evaluate response  - Implement non-pharmacological measures as appropriate and evaluate response  - Consider cultural and social influences on pain and pain management  - Notify physician/advanced practitioner if interventions unsuccessful or patient reports new pain  Outcome: Progressing     Problem: INFECTION - ADULT  Goal: Absence or prevention of progression during hospitalization  Description: INTERVENTIONS:  - Assess and monitor for signs and symptoms of infection  - Monitor lab/diagnostic results  - Monitor all insertion sites, i.e. indwelling lines, tubes, and drains  - Monitor endotracheal if appropriate and nasal secretions for changes in amount and color  - [place] appropriate cooling/warming therapies per order  - Administer medications as ordered  - Instruct and encourage patient and family to use good hand hygiene technique  -  Identify and instruct in appropriate isolation precautions for identified infection/condition  Outcome: Progressing  Goal: Absence of fever/infection during neutropenic period  Description: INTERVENTIONS:  - Monitor WBC    Outcome: Progressing     Problem: SAFETY ADULT  Goal: Patient will remain free of falls  Description: INTERVENTIONS:  - Educate patient/family on patient safety including physical limitations  - Instruct patient to call for assistance with activity   - Consult OT/PT to assist with strengthening/mobility   - Keep Call bell within reach  - Keep bed low and locked with side rails adjusted as appropriate  - Keep care items and personal belongings within reach  - Initiate and maintain comfort rounds  - Make Fall Risk Sign visible to staff  - Offer Toileting every Hours, in advance of need  - Initiate/Maintain alarm  - Obtain necessary fall risk management equipment:   - Apply yellow socks and bracelet for high fall risk patients  - Consider moving patient to room near nurses station  Outcome: Progressing  Goal: Maintain or return to baseline ADL function  Description: INTERVENTIONS:  -  Assess patient's ability to carry out ADLs; assess patient's baseline for ADL function and identify physical deficits which impact ability to perform ADLs (bathing, care of mouth/teeth, toileting, grooming, dressing, etc.)  - Assess/evaluate cause of self-care deficits   - Assess range of motion  - Assess patient's mobility; develop plan if impaired  - Assess patient's need for assistive devices and provide as appropriate  - Encourage maximum independence but intervene and supervise when necessary  - Involve family in performance of ADLs  - Assess for home care needs following discharge   - Consider OT consult to assist with ADL evaluation and planning for discharge  - Provide patient education as appropriate  Outcome: Progressing  Goal: Maintains/Returns to pre admission functional level  Description: INTERVENTIONS:  -  Perform AM-PAC 6 Click Basic Mobility/ Daily Activity assessment daily.  - Set and communicate daily mobility goal to care team and patient/family/caregiver.   - Collaborate with rehabilitation services on mobility goals if consulted  - Perform Range of Motion times a day.  - Reposition patient every  hours.  - Dangle patient  times a day  - Stand patient  times a day  - Ambulate patien times a day  - Out of bed to chair  times a day   - Out of bed for meals  times a day  - Out of bed for toileting  - Record patient progress and toleration of activity level   Outcome: Progressing     Problem: DISCHARGE PLANNING  Goal: Discharge to home or other facility with appropriate resources  Description: INTERVENTIONS:  - Identify barriers to discharge w/patient and caregiver  - Arrange for needed discharge resources and transportation as appropriate  - Identify discharge learning needs (meds, wound care, etc.)  - Arrange for interpretive services to assist at discharge as needed  - Refer to Case Management Department for coordinating discharge planning if the patient needs post-hospital services based on physician/advanced practitioner order or complex needs related to functional status, cognitive ability, or social support system  Outcome: Progressing     Problem: Knowledge Deficit  Goal: Patient/family/caregiver demonstrates understanding of disease process, treatment plan, medications, and discharge instructions  Description: Complete learning assessment and assess knowledge base.  Interventions:  - Provide teaching at level of understanding  - Provide teaching via preferred learning methods  Outcome: Progressing     Problem: Nutrition/Hydration-ADULT  Goal: Nutrient/Hydration intake appropriate for improving, restoring or maintaining nutritional needs  Description: Monitor and assess patient's nutrition/hydration status for malnutrition. Collaborate with interdisciplinary team and initiate plan and interventions as  ordered.  Monitor patient's weight and dietary intake as ordered or per policy. Utilize nutrition screening tool and intervene as necessary. Determine patient's food preferences and provide high-protein, high-caloric foods as appropriate.     INTERVENTIONS:  - Monitor oral intake, urinary output, labs, and treatment plans  - Assess nutrition and hydration status and recommend course of action  - Evaluate amount of meals eaten  - Assist patient with eating if necessary   - Allow adequate time for meals  - Recommend/ encourage appropriate diets, oral nutritional supplements, and vitamin/mineral supplements  - Order, calculate, and assess calorie counts as needed  - Recommend, monitor, and adjust tube feedings and TPN/PPN based on assessed needs  - Assess need for intravenous fluids  - Provide specific nutrition/hydration education as appropriate  - Include patient/family/caregiver in decisions related to nutrition  Outcome: Progressing

## 2025-03-19 NOTE — QUICK NOTE
Chronic pain  Resume oxycodone 15 mg every 6 hours prn   No further adjustments to be made overnight

## 2025-03-19 NOTE — ASSESSMENT & PLAN NOTE
3/18 with complaints of CP overnight, center of chest  Likely GI in etiology as patient was vomiting before and after  Currently CP free   Troponins 8 -> 7  EKG unchanged from prior, no TWI or ST segment changes  Discontinue telemetry  Stat repeat EKG for recurrent chest pain

## 2025-03-19 NOTE — ASSESSMENT & PLAN NOTE
-sudden onset of abdominal pain Friday, diarrhea Saturday, had some vomiting prior to admission. Suspect infectious etiology. Afebrile. WBC count and electrolytes normal. Ct concerned for thickened colon versus under distention. Still with mushy stools, c diff negative, stool PCR negative. No diarrhea for last 24 hours but still with abdominal pain and chest pain   -clears, NPO after midnight  -plan for EGD and colonoscopy tomorrow to assess  -dulcolax and miralax bowel prep today  -anti-emetics as needed

## 2025-03-19 NOTE — ASSESSMENT & PLAN NOTE
59-year-old male with history of type 1 diabetes, coronary artery disease who initially presented with significant generalized abdominal pain and inability to tolerate p.o. intake  H/o gastroparesis   Suspect likely secondary to possible colitis as noted on CT scan to have mild colitis presented with 1 day of diarrhea/dark stools  No tolerating about 25% of meals, all liquids- DC IVF   GI consult, plan for EGD tomorrow- NPO at midnight, IVF, q6hr BG monitoring  May need to add IVF w/ dextrose if BG too low while on insulin pump   Continue IV pain control, prn bentyl - educated patient on effects of opioid use with gastroparesis, attempting to transition to po analgesics   Continue IV Flagyl, will d/c if stool enteric panel is negative  Protonix, Pepcid, Maalox  Advance diet as able  Zofran as needed  Aqua K-pad/heat pack for abdomen

## 2025-03-19 NOTE — ASSESSMENT & PLAN NOTE
Patient with 3 days of dark/burgundy colored loose stools  history of gastroparesis and diverticulosis seen on scope in 2021  CT A/P on admission noted diverticula, diffuse colonic thickening which may represent mild inflammation or colitis  Likely related to colitis which could be infectious, inflammatory or ischemic versus bleeding diverticula  Hemoglobin stable at 12 though this is lower than his baseline in the past which has been 13-14  Possibly hemodilution given IVF-now improved off of IVF  FOBT positive  C. difficile negative  Follow-up stool enteric panel  Continue IV Flagyl until stool enteric panel results   Stool record at bedside  GI consult-plan for colonoscopy tomorrow

## 2025-03-19 NOTE — ASSESSMENT & PLAN NOTE
Lab Results   Component Value Date    HGBA1C 8.2 (H) 03/15/2025       Recent Labs     03/19/25  0207 03/19/25  0344 03/19/25  0554 03/19/25  0720   POCGLU 191* 221* 270* 276*       Blood Sugar Average: Last 72 hrs:  (P) [id number]

## 2025-03-20 ENCOUNTER — ANESTHESIA (INPATIENT)
Dept: GASTROENTEROLOGY | Facility: HOSPITAL | Age: 60
DRG: 074 | End: 2025-03-20
Payer: MEDICARE

## 2025-03-20 ENCOUNTER — ANESTHESIA (OUTPATIENT)
Dept: ANESTHESIOLOGY | Facility: HOSPITAL | Age: 60
End: 2025-03-20

## 2025-03-20 ENCOUNTER — ANESTHESIA EVENT (INPATIENT)
Dept: GASTROENTEROLOGY | Facility: HOSPITAL | Age: 60
DRG: 074 | End: 2025-03-20
Payer: MEDICARE

## 2025-03-20 ENCOUNTER — ANESTHESIA EVENT (OUTPATIENT)
Dept: ANESTHESIOLOGY | Facility: HOSPITAL | Age: 60
End: 2025-03-20

## 2025-03-20 ENCOUNTER — APPOINTMENT (INPATIENT)
Dept: GASTROENTEROLOGY | Facility: HOSPITAL | Age: 60
DRG: 074 | End: 2025-03-20
Payer: MEDICARE

## 2025-03-20 LAB
ANION GAP SERPL CALCULATED.3IONS-SCNC: 6 MMOL/L (ref 4–13)
BUN SERPL-MCNC: 9 MG/DL (ref 5–25)
CALCIUM SERPL-MCNC: 8.8 MG/DL (ref 8.4–10.2)
CHLORIDE SERPL-SCNC: 101 MMOL/L (ref 96–108)
CO2 SERPL-SCNC: 28 MMOL/L (ref 21–32)
CREAT SERPL-MCNC: 0.88 MG/DL (ref 0.6–1.3)
ERYTHROCYTE [DISTWIDTH] IN BLOOD BY AUTOMATED COUNT: 12.5 % (ref 11.6–15.1)
GFR SERPL CREATININE-BSD FRML MDRD: 95 ML/MIN/1.73SQ M
GLUCOSE SERPL-MCNC: 142 MG/DL (ref 65–140)
GLUCOSE SERPL-MCNC: 152 MG/DL (ref 65–140)
GLUCOSE SERPL-MCNC: 161 MG/DL (ref 65–140)
GLUCOSE SERPL-MCNC: 180 MG/DL (ref 65–140)
GLUCOSE SERPL-MCNC: 96 MG/DL (ref 65–140)
HCT VFR BLD AUTO: 34.4 % (ref 36.5–49.3)
HGB BLD-MCNC: 11.8 G/DL (ref 12–17)
MCH RBC QN AUTO: 32.1 PG (ref 26.8–34.3)
MCHC RBC AUTO-ENTMCNC: 34.3 G/DL (ref 31.4–37.4)
MCV RBC AUTO: 94 FL (ref 82–98)
PLATELET # BLD AUTO: 197 THOUSANDS/UL (ref 149–390)
PMV BLD AUTO: 10.5 FL (ref 8.9–12.7)
POTASSIUM SERPL-SCNC: 3.5 MMOL/L (ref 3.5–5.3)
RBC # BLD AUTO: 3.68 MILLION/UL (ref 3.88–5.62)
SODIUM SERPL-SCNC: 135 MMOL/L (ref 135–147)
WBC # BLD AUTO: 6.56 THOUSAND/UL (ref 4.31–10.16)

## 2025-03-20 PROCEDURE — 80048 BASIC METABOLIC PNL TOTAL CA: CPT

## 2025-03-20 PROCEDURE — 0DB78ZX EXCISION OF STOMACH, PYLORUS, VIA NATURAL OR ARTIFICIAL OPENING ENDOSCOPIC, DIAGNOSTIC: ICD-10-PCS | Performed by: STUDENT IN AN ORGANIZED HEALTH CARE EDUCATION/TRAINING PROGRAM

## 2025-03-20 PROCEDURE — 99232 SBSQ HOSP IP/OBS MODERATE 35: CPT

## 2025-03-20 PROCEDURE — 88305 TISSUE EXAM BY PATHOLOGIST: CPT | Performed by: PATHOLOGY

## 2025-03-20 PROCEDURE — 0DB98ZX EXCISION OF DUODENUM, VIA NATURAL OR ARTIFICIAL OPENING ENDOSCOPIC, DIAGNOSTIC: ICD-10-PCS | Performed by: STUDENT IN AN ORGANIZED HEALTH CARE EDUCATION/TRAINING PROGRAM

## 2025-03-20 PROCEDURE — 85027 COMPLETE CBC AUTOMATED: CPT

## 2025-03-20 PROCEDURE — 45378 DIAGNOSTIC COLONOSCOPY: CPT | Performed by: STUDENT IN AN ORGANIZED HEALTH CARE EDUCATION/TRAINING PROGRAM

## 2025-03-20 PROCEDURE — 82948 REAGENT STRIP/BLOOD GLUCOSE: CPT

## 2025-03-20 PROCEDURE — 0DJD8ZZ INSPECTION OF LOWER INTESTINAL TRACT, VIA NATURAL OR ARTIFICIAL OPENING ENDOSCOPIC: ICD-10-PCS | Performed by: STUDENT IN AN ORGANIZED HEALTH CARE EDUCATION/TRAINING PROGRAM

## 2025-03-20 PROCEDURE — 43239 EGD BIOPSY SINGLE/MULTIPLE: CPT | Performed by: STUDENT IN AN ORGANIZED HEALTH CARE EDUCATION/TRAINING PROGRAM

## 2025-03-20 RX ORDER — SODIUM CHLORIDE, SODIUM LACTATE, POTASSIUM CHLORIDE, CALCIUM CHLORIDE 600; 310; 30; 20 MG/100ML; MG/100ML; MG/100ML; MG/100ML
INJECTION, SOLUTION INTRAVENOUS CONTINUOUS PRN
Status: DISCONTINUED | OUTPATIENT
Start: 2025-03-20 | End: 2025-03-20

## 2025-03-20 RX ORDER — ONDANSETRON 2 MG/ML
4 INJECTION INTRAMUSCULAR; INTRAVENOUS EVERY 4 HOURS PRN
Status: DISCONTINUED | OUTPATIENT
Start: 2025-03-20 | End: 2025-03-21 | Stop reason: HOSPADM

## 2025-03-20 RX ORDER — PROPOFOL 10 MG/ML
INJECTION, EMULSION INTRAVENOUS AS NEEDED
Status: DISCONTINUED | OUTPATIENT
Start: 2025-03-20 | End: 2025-03-20

## 2025-03-20 RX ORDER — LIDOCAINE HYDROCHLORIDE 20 MG/ML
INJECTION, SOLUTION EPIDURAL; INFILTRATION; INTRACAUDAL; PERINEURAL AS NEEDED
Status: DISCONTINUED | OUTPATIENT
Start: 2025-03-20 | End: 2025-03-20

## 2025-03-20 RX ORDER — ONDANSETRON 2 MG/ML
INJECTION INTRAMUSCULAR; INTRAVENOUS AS NEEDED
Status: DISCONTINUED | OUTPATIENT
Start: 2025-03-20 | End: 2025-03-20

## 2025-03-20 RX ADMIN — HEPARIN SODIUM 5000 UNITS: 5000 INJECTION INTRAVENOUS; SUBCUTANEOUS at 16:23

## 2025-03-20 RX ADMIN — PROPOFOL 50 MG: 10 INJECTION, EMULSION INTRAVENOUS at 13:46

## 2025-03-20 RX ADMIN — HYDROMORPHONE HYDROCHLORIDE 1 MG: 1 INJECTION, SOLUTION INTRAMUSCULAR; INTRAVENOUS; SUBCUTANEOUS at 20:57

## 2025-03-20 RX ADMIN — HYDROMORPHONE HYDROCHLORIDE 1 MG: 1 INJECTION, SOLUTION INTRAMUSCULAR; INTRAVENOUS; SUBCUTANEOUS at 12:14

## 2025-03-20 RX ADMIN — HYDROMORPHONE HYDROCHLORIDE 1 MG: 1 INJECTION, SOLUTION INTRAMUSCULAR; INTRAVENOUS; SUBCUTANEOUS at 00:15

## 2025-03-20 RX ADMIN — HEPARIN SODIUM 5000 UNITS: 5000 INJECTION INTRAVENOUS; SUBCUTANEOUS at 06:18

## 2025-03-20 RX ADMIN — HYDROMORPHONE HYDROCHLORIDE 1 MG: 1 INJECTION, SOLUTION INTRAMUSCULAR; INTRAVENOUS; SUBCUTANEOUS at 16:23

## 2025-03-20 RX ADMIN — PROPOFOL 50 MG: 10 INJECTION, EMULSION INTRAVENOUS at 13:44

## 2025-03-20 RX ADMIN — DICYCLOMINE HYDROCHLORIDE 10 MG: 10 CAPSULE ORAL at 06:18

## 2025-03-20 RX ADMIN — SODIUM CHLORIDE 75 ML/HR: 0.9 INJECTION, SOLUTION INTRAVENOUS at 00:14

## 2025-03-20 RX ADMIN — DICYCLOMINE HYDROCHLORIDE 10 MG: 10 CAPSULE ORAL at 20:58

## 2025-03-20 RX ADMIN — CLOPIDOGREL BISULFATE 75 MG: 75 TABLET ORAL at 08:17

## 2025-03-20 RX ADMIN — DICYCLOMINE HYDROCHLORIDE 10 MG: 10 CAPSULE ORAL at 16:23

## 2025-03-20 RX ADMIN — ONDANSETRON 4 MG: 2 INJECTION INTRAMUSCULAR; INTRAVENOUS at 01:22

## 2025-03-20 RX ADMIN — SODIUM CHLORIDE, SODIUM LACTATE, POTASSIUM CHLORIDE, AND CALCIUM CHLORIDE: .6; .31; .03; .02 INJECTION, SOLUTION INTRAVENOUS at 13:20

## 2025-03-20 RX ADMIN — HYDROMORPHONE HYDROCHLORIDE 1 MG: 1 INJECTION, SOLUTION INTRAMUSCULAR; INTRAVENOUS; SUBCUTANEOUS at 04:15

## 2025-03-20 RX ADMIN — HYDROMORPHONE HYDROCHLORIDE 1 MG: 1 INJECTION, SOLUTION INTRAMUSCULAR; INTRAVENOUS; SUBCUTANEOUS at 08:12

## 2025-03-20 RX ADMIN — FAMOTIDINE 20 MG: 20 TABLET, FILM COATED ORAL at 08:17

## 2025-03-20 RX ADMIN — ONDANSETRON 4 MG: 2 INJECTION INTRAMUSCULAR; INTRAVENOUS at 13:38

## 2025-03-20 RX ADMIN — PANTOPRAZOLE SODIUM 40 MG: 40 INJECTION, POWDER, LYOPHILIZED, FOR SOLUTION INTRAVENOUS at 08:24

## 2025-03-20 RX ADMIN — Medication 40 MG: at 13:57

## 2025-03-20 RX ADMIN — FAMOTIDINE 20 MG: 20 TABLET, FILM COATED ORAL at 17:48

## 2025-03-20 RX ADMIN — LIDOCAINE HYDROCHLORIDE 100 MG: 20 INJECTION, SOLUTION EPIDURAL; INFILTRATION; INTRACAUDAL at 13:41

## 2025-03-20 RX ADMIN — HEPARIN SODIUM 5000 UNITS: 5000 INJECTION INTRAVENOUS; SUBCUTANEOUS at 20:58

## 2025-03-20 RX ADMIN — PROPOFOL 50 MG: 10 INJECTION, EMULSION INTRAVENOUS at 13:42

## 2025-03-20 RX ADMIN — SODIUM CHLORIDE 75 ML/HR: 0.9 INJECTION, SOLUTION INTRAVENOUS at 16:22

## 2025-03-20 RX ADMIN — ASPIRIN 81 MG CHEWABLE TABLET 81 MG: 81 TABLET CHEWABLE at 08:17

## 2025-03-20 RX ADMIN — PROPOFOL 150 MCG/KG/MIN: 10 INJECTION, EMULSION INTRAVENOUS at 13:53

## 2025-03-20 RX ADMIN — LEVOTHYROXINE SODIUM 112 MCG: 112 TABLET ORAL at 06:18

## 2025-03-20 RX ADMIN — PROPOFOL 150 MG: 10 INJECTION, EMULSION INTRAVENOUS at 13:41

## 2025-03-20 NOTE — CASE MANAGEMENT
Case Management Assessment    Patient name Brian Butterfield  Location W /W -01 MRN [id number]  : 1965 Date 3/20/2025       Current Admission Date: 3/15/2025  Current Admission Diagnosis:Intractable abdominal pain   Patient Active Problem List    Diagnosis Date Noted Date Diagnosed    Other chest pain 2025     Dark stools 2025     Diarrhea of presumed infectious origin 2025     Intractable abdominal pain 03/15/2025     Epigastric pain 2023     Chronic pain of left knee 2023     Bradycardia 2023     HTN (hypertension) 2023     Thyroid nodule 2023     Hypomagnesemia 2021     Status post angioplasty with stent 2021     Hypothyroidism 2021     Diverticulosis 2021     Elevated troponin level not due myocardial infarction 2021     COPD (chronic obstructive pulmonary disease) (HCC) 2021     Diabetic polyneuropathy associated with type 1 diabetes mellitus ([facility]) 2020     CAD S/P percutaneous coronary angioplasty 2020     Acute gastritis without hemorrhage 2020     Marijuana use 2020     Positive blood culture 2020     Type 1 diabetes mellitus (HCC) 2020     Intractable nausea and vomiting 2020     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    Primary Insurance: MEDICARE  Secondary Insurance: [employer] [facility]    ASSESSMENT:  Active Health Care  Proxies    There are no active Health Care Proxies on file.                 Readmission Root Cause  30 Day Readmission: No    Patient Information  Admitted from:: Home  Mental Status: Alert  During Assessment patient was accompanied by: Not accompanied during assessment  Assessment information provided by:: Patient  Primary Caregiver: Self  Support Systems: Family members, Spouse/significant other  Home entry access options. Select all that apply.: No steps to enter home  Type of Current Residence: 2 story home  Upon entering residence, is there a bedroom on the main floor (no further steps)?: No  A bedroom is located on the following floor levels of residence (select all that apply):: 2nd Floor  Upon entering residence, is there a bathroom on the main floor (no further steps)?: Yes  Number of steps to 2nd floor from main floor: One Flight  Living Arrangements: Lives w/ Family members    Activities of Daily Living Prior to Admission  Functional Status: Independent  Completes ADLs independently?: Yes  Ambulates independently?: Yes  Does patient use assisted devices?: No  Does patient currently own DME?: No  Does patient have a history of Outpatient Therapy (PT/OT)?: No  Does the patient have a history of Short-Term Rehab?: No  Does patient have a history of HHC?: No  Does patient currently have HHC?: No         Patient Information Continued  Income Source: SSI/SSD  Does patient have prescription coverage?: Yes  Can the patient afford their medications and any related supplies (such as glucometers or test strips)?: Yes  Does patient receive dialysis treatments?: No  Does patient have a history of substance abuse?: No         Means of Transportation  Means of Transport to [facility]:: Drives Self

## 2025-03-20 NOTE — ANESTHESIA POSTPROCEDURE EVALUATION
Post-Op Assessment Note    CV Status:  Stable    Pain management: adequate       Mental Status:  Alert and awake   Hydration Status:  Euvolemic   PONV Controlled:  Controlled   Airway Patency:  Patent     Post Op Vitals Reviewed: Yes    No anethesia notable event occurred.    Staff: Anesthesiologist           Last Filed PACU Vitals:  Vitals Value Taken Time   Temp 98.4 °F (36.9 °C) 03/20/25 1411   Pulse 67 03/20/25 1431   /79 03/20/25 1431   Resp 16 03/20/25 1431   SpO2 99 % 03/20/25 1431       Modified Madelyn:     Vitals Value Taken Time   Activity 2 03/20/25 1431   Respiration 2 03/20/25 1431   Circulation 2 03/20/25 1431   Consciousness 2 03/20/25 1431   Oxygen Saturation 2 03/20/25 1431     Modified Madelyn Score: 10

## 2025-03-20 NOTE — PLAN OF CARE
Problem: Potential for Falls  Goal: Patient will remain free of falls  Description: INTERVENTIONS:  - Educate patient/family on patient safety including physical limitations  - Instruct patient to call for assistance with activity   - Consult OT/PT to assist with strengthening/mobility   - Keep Call bell within reach  - Keep bed low and locked with side rails adjusted as appropriate  - Keep care items and personal belongings within reach  - Initiate and maintain comfort rounds  - Make Fall Risk Sign visible to staff  - Offer Toileting every  Hours, in advance of need  - Initiate/Maintain alarm  - Obtain necessary fall risk management equipment:   - Apply yellow socks and bracelet for high fall risk patients  - Consider moving patient to room near nurses station  Outcome: Progressing     Problem: PAIN - ADULT  Goal: Verbalizes/displays adequate comfort level or baseline comfort level  Description: Interventions:  - Encourage patient to monitor pain and request assistance  - Assess pain using appropriate pain scale  - Administer analgesics based on type and severity of pain and evaluate response  - Implement non-pharmacological measures as appropriate and evaluate response  - Consider cultural and social influences on pain and pain management  - Notify physician/advanced practitioner if interventions unsuccessful or patient reports new pain  Outcome: Progressing     Problem: INFECTION - ADULT  Goal: Absence or prevention of progression during hospitalization  Description: INTERVENTIONS:  - Assess and monitor for signs and symptoms of infection  - Monitor lab/diagnostic results  - Monitor all insertion sites, i.e. indwelling lines, tubes, and drains  - Monitor endotracheal if appropriate and nasal secretions for changes in amount and color  - [place] appropriate cooling/warming therapies per order  - Administer medications as ordered  - Instruct and encourage patient and family to use good hand hygiene technique  -  Identify and instruct in appropriate isolation precautions for identified infection/condition  Outcome: Progressing  Goal: Absence of fever/infection during neutropenic period  Description: INTERVENTIONS:  - Monitor WBC    Outcome: Progressing     Problem: SAFETY ADULT  Goal: Patient will remain free of falls  Description: INTERVENTIONS:  - Educate patient/family on patient safety including physical limitations  - Instruct patient to call for assistance with activity   - Consult OT/PT to assist with strengthening/mobility   - Keep Call bell within reach  - Keep bed low and locked with side rails adjusted as appropriate  - Keep care items and personal belongings within reach  - Initiate and maintain comfort rounds  - Make Fall Risk Sign visible to staff  - Offer Toileting every  Hours, in advance of need  - Initiate/Maintain alarm  - Obtain necessary fall risk management equipment:   - Apply yellow socks and bracelet for high fall risk patients  - Consider moving patient to room near nurses station  Outcome: Progressing  Goal: Maintain or return to baseline ADL function  Description: INTERVENTIONS:  -  Assess patient's ability to carry out ADLs; assess patient's baseline for ADL function and identify physical deficits which impact ability to perform ADLs (bathing, care of mouth/teeth, toileting, grooming, dressing, etc.)  - Assess/evaluate cause of self-care deficits   - Assess range of motion  - Assess patient's mobility; develop plan if impaired  - Assess patient's need for assistive devices and provide as appropriate  - Encourage maximum independence but intervene and supervise when necessary  - Involve family in performance of ADLs  - Assess for home care needs following discharge   - Consider OT consult to assist with ADL evaluation and planning for discharge  - Provide patient education as appropriate  Outcome: Progressing  Goal: Maintains/Returns to pre admission functional level  Description:  INTERVENTIONS:  - Perform AM-PAC 6 Click Basic Mobility/ Daily Activity assessment daily.  - Set and communicate daily mobility goal to care team and patient/family/caregiver.   - Collaborate with rehabilitation services on mobility goals if consulted  - Perform Range of Motion  times a day.  - Reposition patient every  hours.  - Dangle patient  times a day  - Stand patient  times a day  - Ambulate patient  times a day  - Out of bed to chair  times a day   - Out of bed for meals  times a day  - Out of bed for toileting  - Record patient progress and toleration of activity level   Outcome: Progressing     Problem: DISCHARGE PLANNING  Goal: Discharge to home or other facility with appropriate resources  Description: INTERVENTIONS:  - Identify barriers to discharge w/patient and caregiver  - Arrange for needed discharge resources and transportation as appropriate  - Identify discharge learning needs (meds, wound care, etc.)  - Arrange for interpretive services to assist at discharge as needed  - Refer to Case Management Department for coordinating discharge planning if the patient needs post-hospital services based on physician/advanced practitioner order or complex needs related to functional status, cognitive ability, or social support system  Outcome: Progressing     Problem: Knowledge Deficit  Goal: Patient/family/caregiver demonstrates understanding of disease process, treatment plan, medications, and discharge instructions  Description: Complete learning assessment and assess knowledge base.  Interventions:  - Provide teaching at level of understanding  - Provide teaching via preferred learning methods  Outcome: Progressing     Problem: Nutrition/Hydration-ADULT  Goal: Nutrient/Hydration intake appropriate for improving, restoring or maintaining nutritional needs  Description: Monitor and assess patient's nutrition/hydration status for malnutrition. Collaborate with interdisciplinary team and initiate plan and  interventions as ordered.  Monitor patient's weight and dietary intake as ordered or per policy. Utilize nutrition screening tool and intervene as necessary. Determine patient's food preferences and provide high-protein, high-caloric foods as appropriate.     INTERVENTIONS:  - Monitor oral intake, urinary output, labs, and treatment plans  - Assess nutrition and hydration status and recommend course of action  - Evaluate amount of meals eaten  - Assist patient with eating if necessary   - Allow adequate time for meals  - Recommend/ encourage appropriate diets, oral nutritional supplements, and vitamin/mineral supplements  - Order, calculate, and assess calorie counts as needed  - Recommend, monitor, and adjust tube feedings and TPN/PPN based on assessed needs  - Assess need for intravenous fluids  - Provide specific nutrition/hydration education as appropriate  - Include patient/family/caregiver in decisions related to nutrition  Outcome: Progressing

## 2025-03-20 NOTE — ASSESSMENT & PLAN NOTE
3/18 with complaints of CP overnight, center of chest  Likely GI in etiology as patient was vomiting before and after  3/20 continues to remain CP free   Troponins 8 -> 7  EKG unchanged from prior, no TWI or ST segment changes  Discontinue telemetry  Stat repeat EKG for recurrent chest pain

## 2025-03-20 NOTE — ANESTHESIA PREPROCEDURE EVALUATION
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 Systolic function is   low normal with an ejection fraction of 50-55%. Wall motion is within   normal limits.     Right Ventricle: Systolic function is normal.     Mitral Valve: There is mild regurgitation.     Lab Results   Component Value Date    WBC 6.56 03/20/2025    HGB 11.8 (L) 03/20/2025    HCT 34.4 (L) 03/20/2025    MCV 94 03/20/2025     03/20/2025     Lab Results   Component Value Date    SODIUM 135 03/20/2025    K 3.5 03/20/2025     03/20/2025    CO2 28 03/20/2025    BUN 9 03/20/2025    CREATININE 0.88 03/20/2025    GLUC 180 (H) 03/20/2025    CALCIUM 8.8 03/20/2025     Lab Results   Component Value Date    INR 1.06 12/30/2023    INR 1.2 01/23/2022    INR 1.5 09/29/2021    PROTIME 14.4 12/30/2023    PROTIME 13.8 09/17/2020     Lab Results   Component Value Date    HGBA1C 8.2 (H) 03/15/2025                 Anesthesia Plan  ASA Score- 3     Anesthesia Type- IV sedation with anesthesia with ASA Monitors.         Additional Monitors:     Airway Plan:            Plan Factors-    Chart reviewed. EKG reviewed.  Existing labs reviewed. Patient summary reviewed.                  Induction- intravenous.    Postoperative Plan-     Perioperative Resuscitation Plan - Level 1 - Full Code.       Informed Consent-       NPO  Status:  No vitals data found for the desired time range.

## 2025-03-20 NOTE — ASSESSMENT & PLAN NOTE
Patient with 3 days of dark/burgundy colored loose stools  history of gastroparesis and diverticulosis seen on scope in 2021  CT A/P on admission noted diverticula, diffuse colonic thickening which may represent mild inflammation or colitis  Likely related to colitis which could be infectious, inflammatory or ischemic versus bleeding diverticula  Hemoglobin 11.8, baseline hgb 13-14  FOBT positive  C. difficile negative  stool enteric panel- negative   D/C flagyl   GI consult-plan for colonoscopy today 3/20- tolerated bowel prep

## 2025-03-20 NOTE — ANESTHESIA PREPROCEDURE EVALUATION
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 Systolic function is   low normal with an ejection fraction of 50-55%. Wall motion is within   normal limits.     Right Ventricle: Systolic function is normal.     Mitral Valve: There is mild regurgitation.     Lab Results   Component Value Date    WBC 6.56 03/20/2025    HGB 11.8 (L) 03/20/2025    HCT 34.4 (L) 03/20/2025    MCV 94 03/20/2025     03/20/2025     Lab Results   Component Value Date    SODIUM 135 03/20/2025    K 3.5 03/20/2025     03/20/2025    CO2 28 03/20/2025    BUN 9 03/20/2025    CREATININE 0.88 03/20/2025    GLUC 180 (H) 03/20/2025    CALCIUM 8.8 03/20/2025     Lab Results   Component Value Date    INR 1.06 12/30/2023    INR 1.2 01/23/2022    INR 1.5 09/29/2021    PROTIME 14.4 12/30/2023    PROTIME 13.8 09/17/2020     Lab Results   Component Value Date    HGBA1C 8.2 (H) 03/15/2025            Physical Exam    Airway    Mallampati score: II  TM Distance: >3 FB  Neck ROM: full     Dental       Cardiovascular      Pulmonary      Other Findings        Anesthesia Plan  ASA Score- 3     Anesthesia Type- IV sedation with anesthesia with  ASA Monitors.         Additional Monitors:     Airway Plan:     Comment: No solids since 3/15; clear liquid diet since 3/16, NPO at 12am.       Plan Factors-Exercise tolerance (METS): >4 METS.    Chart reviewed. EKG reviewed.  Existing labs reviewed. Patient summary reviewed.                  Induction- intravenous.    Postoperative Plan-     Perioperative Resuscitation Plan - Level 1 - Full Code.       Informed Consent- Anesthetic plan and risks discussed with patient.  I personally reviewed this patient with the CRNA. Discussed and agreed on the Anesthesia Plan with the CRNA..      NPO Status:  Vitals Value Taken Time   Date of last liquid 03/20/25 03/20/25 1255   Time of last liquid 0100 03/20/25 1255   Date of last solid 03/19/25 03/20/25 1255   Time of last solid 0900 03/20/25 1255

## 2025-03-20 NOTE — PROGRESS NOTES
Progress Note - Hospitalist   Name: Brian Butterfield 59 y.o. male I MRN: [id number]  Unit/Bed#: W -01 I Date of Admission: 3/15/2025   Date of Service: 3/20/2025 I Hospital Day: 4    Assessment & Plan  Intractable abdominal pain  59-year-old male with history of type 1 diabetes, coronary artery disease who initially presented with significant generalized abdominal pain and inability to tolerate p.o. intake  H/o gastroparesis   Suspect likely secondary to possible colitis as noted on CT scan to have mild colitis presented with 1 day of diarrhea/dark stools  Now tolerating about 25% of meals, all liquids- DC IVF   GI consult:   EGD and Colonoscopy to be completed on 3/20/25   Continue IV pain control, prn bentyl - educated patient on effects of opioid use with gastroparesis, attempting to transition to po analgesics   D/C Flagyl- stool enteric panel is negative  Protonix, Pepcid, Maalox  Advance diet as able  Zofran as needed  Aqua K-pad/heat pack for abdomen  Dark stools  Patient with 3 days of dark/burgundy colored loose stools  history of gastroparesis and diverticulosis seen on scope in 2021  CT A/P on admission noted diverticula, diffuse colonic thickening which may represent mild inflammation or colitis  Likely related to colitis which could be infectious, inflammatory or ischemic versus bleeding diverticula  Hemoglobin 11.8, baseline hgb 13-14  FOBT positive  C. difficile negative  stool enteric panel- negative   D/C flagyl   GI consult-plan for colonoscopy today 3/20- tolerated bowel prep  COPD (chronic obstructive pulmonary disease) (HCC)  Not in acute exacerbation  Continue home inhalers  CAD S/P percutaneous coronary angioplasty  History of coronary artery disease status post PCI 2020 and 2021  Continue aspirin, Plavix   Diabetic polyneuropathy associated with type 1 diabetes mellitus (HCC)    Lab Results   Component Value Date    HGBA1C 8.2 (H) 03/15/2025     Patient to adjust pump according to  fingersticks, if this is not effective we will discontinue pump and place on insulin drip.  Continue home insulin pump for now  Q6h BG checks  Hypoglycemia protocol   Other chest pain  3/18 with complaints of CP overnight, center of chest  Likely GI in etiology as patient was vomiting before and after  3/20 continues to remain CP free   Troponins 8 -> 7  EKG unchanged from prior, no TWI or ST segment changes  Discontinue telemetry  Stat repeat EKG for recurrent chest pain  Diarrhea of presumed infectious origin      VTE Pharmacologic Prophylaxis: VTE Score: 5 High Risk (Score >/= 5) - Pharmacological DVT Prophylaxis Contraindicated. Sequential Compression Devices Ordered.    Mobility:   Basic Mobility Inpatient Raw Score: 24  JH-HLM Goal: 8: Walk 250 feet or more  JH-HLM Achieved: 7: Walk 25 feet or more  JH-HLM Goal achieved. Continue to encourage appropriate mobility.    Patient Centered Rounds: I performed bedside rounds with nursing staff today.   Discussions with Specialists or Other Care Team Provider: GI    Education and Discussions with Family / Patient: Patient declined call to .     Current Length of Stay: 4 day(s)  Current Patient Status: Inpatient   Certification Statement: The patient will continue to require additional inpatient hospital stay due to EGD and Colonoscopy on 3/20, pain control and advancement of diet   Discharge Plan: Anticipate discharge in 48 hrs to home.    Code Status: Level 1 - Full Code    Subjective   Patient seen and examined at bedside. He still is endorsing abdominal pain. States pain is currently 6/10 after just receiving IV medication. Patient describes the pain as a burning sensation. He understands that he will be undergoing an EGD and colonoscopy this afternoon.     Denies any chest pian, shortness of breath. Reports that he was able to tolerate the bowel prep last evening still burgandy in color. Denies further episodes of vomiting last evening.     Objective  :  Temp:  [98.1 °F (36.7 °C)-98.9 °F (37.2 °C)] 98.1 °F (36.7 °C)  HR:  [59-65] 61  BP: (116-150)/(51-67) 116/51  Resp:  [22] 22  SpO2:  [96 %-97 %] 96 %    Body mass index is 27.26 kg/m².     Input and Output Summary (last 24 hours):     Intake/Output Summary (Last 24 hours) at 3/20/2025 1000  Last data filed at 3/20/2025 0712  Gross per 24 hour   Intake 130 ml   Output --   Net 130 ml       Physical Exam  Vitals reviewed.   Constitutional:       General: He is not in acute distress.     Appearance: He is not diaphoretic.   Cardiovascular:      Rate and Rhythm: Normal rate and regular rhythm.      Heart sounds: No murmur heard.  Pulmonary:      Effort: No respiratory distress.      Breath sounds: Normal breath sounds.   Abdominal:      Tenderness: There is abdominal tenderness.   Musculoskeletal:      Right lower leg: No edema.      Left lower leg: No edema.   Skin:     General: Skin is warm and moist.   Neurological:      Mental Status: He is alert and oriented to person, place, and time.   Psychiatric:         Mood and Affect: Mood is not anxious.           Lines/Drains:              Lab Results: I have reviewed the following results:   Results from last 7 days   Lab Units 03/20/25  0444 03/17/25  0904 03/16/25  1018   WBC Thousand/uL 6.56   < > 9.52   HEMOGLOBIN g/dL 11.8*   < > 12.7   HEMATOCRIT % 34.4*   < > 37.8   PLATELETS Thousands/uL 197   < > 194   SEGS PCT %  --   --  72   LYMPHO PCT %  --   --  20   MONO PCT %  --   --  8   EOS PCT %  --   --  0    < > = values in this interval not displayed.     Results from last 7 days   Lab Units 03/20/25  0444 03/16/25  1018 03/15/25  1555   SODIUM mmol/L 135   < > 141   POTASSIUM mmol/L 3.5   < > 3.5   CHLORIDE mmol/L 101   < > 100   CO2 mmol/L 28   < > 30   BUN mg/dL 9   < > 14   CREATININE mg/dL 0.88   < > 0.92   ANION GAP mmol/L 6   < > 11   CALCIUM mg/dL 8.8   < > 9.5   ALBUMIN g/dL  --   --  4.4   TOTAL BILIRUBIN mg/dL  --   --  0.60   ALK PHOS U/L  --   --   72   ALT U/L  --   --  14   AST U/L  --   --  30   GLUCOSE RANDOM mg/dL 180*   < > 120    < > = values in this interval not displayed.         Results from last 7 days   Lab Units 03/20/25  0624 03/20/25  0002 03/19/25  2013 03/19/25  1616 03/19/25  1043 03/19/25  0720 03/19/25  0554 03/19/25  0344 03/19/25  0207 03/18/25  2123 03/18/25  1558 03/18/25  1059   POC GLUCOSE mg/dl 142* 161* 238* 112 178* 276* 270* 221* 191* 211* 183* 174*     Results from last 7 days   Lab Units 03/15/25  1942   HEMOGLOBIN A1C % 8.2*           Recent Cultures (last 7 days):   Results from last 7 days   Lab Units 03/17/25  0855   C DIFF TOXIN B BY PCR  Negative       Imaging Results Review: No pertinent imaging studies reviewed.  Other Study Results Review: No additional pertinent studies reviewed.    Last 24 Hours Medication List:     Current Facility-Administered Medications:     acetaminophen (TYLENOL) tablet 650 mg, Q6H PRN    aluminum-magnesium hydroxide-simethicone (MAALOX) oral suspension 30 mL, Q4H PRN    aspirin chewable tablet 81 mg, Daily    clopidogrel (PLAVIX) tablet 75 mg, Daily    dicyclomine (BENTYL) capsule 10 mg, 4x Daily (AC & HS)    famotidine (PEPCID) tablet 20 mg, BID    heparin (porcine) subcutaneous injection 5,000 Units, Q8H GUEVARA    HYDROmorphone (DILAUDID) injection 1 mg, Q4H PRN    insulin aspart (NovoLOG) FOR PUMP REFILLS 1 Units, Daily PRN    levothyroxine tablet 112 mcg, Early Morning    lisinopril (ZESTRIL) tablet 2.5 mg, Daily    morphine injection 2 mg, Q4H PRN    ondansetron (ZOFRAN) injection 4 mg, Q4H PRN    pantoprazole (PROTONIX) injection 40 mg, Q24H Count includes the [facility]    PATIENT MAINTAINED INSULIN PUMP 1 each, Q8H    polyethylene glycol (MIRALAX) packet 17 g, Daily    sodium chloride 0.9 % infusion, Continuous, Last Rate: 75 mL/hr (03/20/25 0014)    umeclidinium 62.5 mcg/actuation inhaler AEPB 1 puff, Daily    Administrative Statements   Today, Patient Was Seen By: Emily Potts PA-C      **Please Note:  This note may have been constructed using a voice recognition system.**

## 2025-03-20 NOTE — PLAN OF CARE
Problem: Potential for Falls  Goal: Patient will remain free of falls  Description: INTERVENTIONS:  - Educate patient/family on patient safety including physical limitations  - Instruct patient to call for assistance with activity   - Consult OT/PT to assist with strengthening/mobility   - Keep Call bell within reach  - Keep bed low and locked with side rails adjusted as appropriate  - Keep care items and personal belongings within reach  - Initiate and maintain comfort rounds  - Make Fall Risk Sign visible to staff  - Offer Toileting every Hours, in advance of need   Initiate/Maintain alarm  - Obtain necessary fall risk management equipment:   - Apply yellow socks and bracelet for high fall risk patients  - Consider moving patient to room near nurses station  Outcome: Progressing     Problem: PAIN - ADULT  Goal: Verbalizes/displays adequate comfort level or baseline comfort level  Description: Interventions:  - Encourage patient to monitor pain and request assistance  - Assess pain using appropriate pain scale  - Administer analgesics based on type and severity of pain and evaluate response  - Implement non-pharmacological measures as appropriate and evaluate response  - Consider cultural and social influences on pain and pain management  - Notify physician/advanced practitioner if interventions unsuccessful or patient reports new pain  Outcome: Progressing     Problem: INFECTION - ADULT  Goal: Absence or prevention of progression during hospitalization  Description: INTERVENTIONS:  - Assess and monitor for signs and symptoms of infection  - Monitor lab/diagnostic results  - Monitor all insertion sites, i.e. indwelling lines, tubes, and drains  - Monitor endotracheal if appropriate and nasal secretions for changes in amount and color  - [place] appropriate cooling/warming therapies per order  - Administer medications as ordered  - Instruct and encourage patient and family to use good hand hygiene technique  -  Identify and instruct in appropriate isolation precautions for identified infection/condition  Outcome: Progressing  Goal: Absence of fever/infection during neutropenic period  Description: INTERVENTIONS:  - Monitor WBC    Outcome: Progressing     Problem: SAFETY ADULT  Goal: Patient will remain free of falls  Description: INTERVENTIONS:  - Educate patient/family on patient safety including physical limitations  - Instruct patient to call for assistance with activity   - Consult OT/PT to assist with strengthening/mobility   - Keep Call bell within reach  - Keep bed low and locked with side rails adjusted as appropriate  - Keep care items and personal belongings within reach  - Initiate and maintain comfort rounds  - Make Fall Risk Sign visible to staff  - Offer Toileting every  Hours, in advance of need  - Initiate/Maintain alarm  - Obtain necessary fall risk management equipment:   - Apply yellow socks and bracelet for high fall risk patients  - Consider moving patient to room near nurses station  Outcome: Progressing  Goal: Maintain or return to baseline ADL function  Description: INTERVENTIONS:  -  Assess patient's ability to carry out ADLs; assess patient's baseline for ADL function and identify physical deficits which impact ability to perform ADLs (bathing, care of mouth/teeth, toileting, grooming, dressing, etc.)  - Assess/evaluate cause of self-care deficits   - Assess range of motion  - Assess patient's mobility; develop plan if impaired  - Assess patient's need for assistive devices and provide as appropriate  - Encourage maximum independence but intervene and supervise when necessary  - Involve family in performance of ADLs  - Assess for home care needs following discharge   - Consider OT consult to assist with ADL evaluation and planning for discharge  - Provide patient education as appropriate  Outcome: Progressing  Goal: Maintains/Returns to pre admission functional level  Description:  INTERVENTIONS:  - Perform AM-PAC 6 Click Basic Mobility/ Daily Activity assessment daily.  - Set and communicate daily mobility goal to care team and patient/family/caregiver.   - Collaborate with rehabilitation services on mobility goals if consulted  - Perform Range of Motion  times a day.  - Reposition patient every  hours.  - Dangle patient  times a day  - Stand patient  times a day  - Ambulate patient  times a day  - Out of bed to chair  times a day   - Out of bed for meals  times a day  - Out of bed for toileting  - Record patient progress and toleration of activity level   Outcome: Progressing     Problem: DISCHARGE PLANNING  Goal: Discharge to home or other facility with appropriate resources  Description: INTERVENTIONS:  - Identify barriers to discharge w/patient and caregiver  - Arrange for needed discharge resources and transportation as appropriate  - Identify discharge learning needs (meds, wound care, etc.)  - Arrange for interpretive services to assist at discharge as needed  - Refer to Case Management Department for coordinating discharge planning if the patient needs post-hospital services based on physician/advanced practitioner order or complex needs related to functional status, cognitive ability, or social support system  Outcome: Progressing

## 2025-03-20 NOTE — ASSESSMENT & PLAN NOTE
59-year-old male with history of type 1 diabetes, coronary artery disease who initially presented with significant generalized abdominal pain and inability to tolerate p.o. intake  H/o gastroparesis   Suspect likely secondary to possible colitis as noted on CT scan to have mild colitis presented with 1 day of diarrhea/dark stools  Now tolerating about 25% of meals, all liquids- DC IVF   GI consult:   EGD and Colonoscopy to be completed on 3/20/25   Continue IV pain control, prn bentyl - educated patient on effects of opioid use with gastroparesis, attempting to transition to po analgesics   D/C Flagyl- stool enteric panel is negative  Protonix, Pepcid, Maalox  Advance diet as able  Zofran as needed  Aqua K-pad/heat pack for abdomen

## 2025-03-20 NOTE — ASSESSMENT & PLAN NOTE
Lab Results   Component Value Date    HGBA1C 8.2 (H) 03/15/2025     Patient to adjust pump according to fingersticks, if this is not effective we will discontinue pump and place on insulin drip.  Continue home insulin pump for now  Q6h BG checks  Hypoglycemia protocol

## 2025-03-21 VITALS
SYSTOLIC BLOOD PRESSURE: 126 MMHG | BODY MASS INDEX: 27.26 KG/M2 | OXYGEN SATURATION: 97 % | TEMPERATURE: 98.5 F | RESPIRATION RATE: 16 BRPM | WEIGHT: 163.8 LBS | HEART RATE: 60 BPM | DIASTOLIC BLOOD PRESSURE: 60 MMHG

## 2025-03-21 LAB
ANION GAP SERPL CALCULATED.3IONS-SCNC: 5 MMOL/L (ref 4–13)
BUN SERPL-MCNC: 8 MG/DL (ref 5–25)
CALCIUM SERPL-MCNC: 8.4 MG/DL (ref 8.4–10.2)
CHLORIDE SERPL-SCNC: 103 MMOL/L (ref 96–108)
CO2 SERPL-SCNC: 30 MMOL/L (ref 21–32)
CREAT SERPL-MCNC: 0.88 MG/DL (ref 0.6–1.3)
ERYTHROCYTE [DISTWIDTH] IN BLOOD BY AUTOMATED COUNT: 12.4 % (ref 11.6–15.1)
GFR SERPL CREATININE-BSD FRML MDRD: 95 ML/MIN/1.73SQ M
GLUCOSE SERPL-MCNC: 109 MG/DL (ref 65–140)
GLUCOSE SERPL-MCNC: 146 MG/DL (ref 65–140)
GLUCOSE SERPL-MCNC: 196 MG/DL (ref 65–140)
GLUCOSE SERPL-MCNC: 82 MG/DL (ref 65–140)
HCT VFR BLD AUTO: 33.8 % (ref 36.5–49.3)
HGB BLD-MCNC: 11.9 G/DL (ref 12–17)
MCH RBC QN AUTO: 32.7 PG (ref 26.8–34.3)
MCHC RBC AUTO-ENTMCNC: 35.2 G/DL (ref 31.4–37.4)
MCV RBC AUTO: 93 FL (ref 82–98)
PLATELET # BLD AUTO: 179 THOUSANDS/UL (ref 149–390)
PMV BLD AUTO: 10.4 FL (ref 8.9–12.7)
POTASSIUM SERPL-SCNC: 3.6 MMOL/L (ref 3.5–5.3)
RBC # BLD AUTO: 3.64 MILLION/UL (ref 3.88–5.62)
SODIUM SERPL-SCNC: 138 MMOL/L (ref 135–147)
WBC # BLD AUTO: 6.48 THOUSAND/UL (ref 4.31–10.16)

## 2025-03-21 PROCEDURE — 80048 BASIC METABOLIC PNL TOTAL CA: CPT

## 2025-03-21 PROCEDURE — 85027 COMPLETE CBC AUTOMATED: CPT

## 2025-03-21 PROCEDURE — 82948 REAGENT STRIP/BLOOD GLUCOSE: CPT

## 2025-03-21 PROCEDURE — 99239 HOSP IP/OBS DSCHRG MGMT >30: CPT

## 2025-03-21 RX ORDER — DICYCLOMINE HYDROCHLORIDE 10 MG/1
10 CAPSULE ORAL
Qty: 56 CAPSULE | Refills: 0 | Status: SHIPPED | OUTPATIENT
Start: 2025-03-21 | End: 2025-04-04

## 2025-03-21 RX ORDER — MAGNESIUM HYDROXIDE/ALUMINUM HYDROXICE/SIMETHICONE 120; 1200; 1200 MG/30ML; MG/30ML; MG/30ML
15 SUSPENSION ORAL EVERY 4 HOURS PRN
Qty: 355 ML | Refills: 0 | Status: SHIPPED | OUTPATIENT
Start: 2025-03-21

## 2025-03-21 RX ORDER — ONDANSETRON 4 MG/1
4 TABLET, FILM COATED ORAL EVERY 8 HOURS PRN
Qty: 20 TABLET | Refills: 0 | Status: SHIPPED | OUTPATIENT
Start: 2025-03-21

## 2025-03-21 RX ORDER — OXYCODONE HYDROCHLORIDE 5 MG/1
5 TABLET ORAL EVERY 6 HOURS PRN
Refills: 0 | Status: DISCONTINUED | OUTPATIENT
Start: 2025-03-21 | End: 2025-03-21 | Stop reason: HOSPADM

## 2025-03-21 RX ORDER — PANTOPRAZOLE SODIUM 40 MG/1
40 TABLET, DELAYED RELEASE ORAL DAILY
Qty: 30 TABLET | Refills: 0 | Status: SHIPPED | OUTPATIENT
Start: 2025-03-21

## 2025-03-21 RX ADMIN — CLOPIDOGREL BISULFATE 75 MG: 75 TABLET ORAL at 08:22

## 2025-03-21 RX ADMIN — LISINOPRIL 2.5 MG: 2.5 TABLET ORAL at 08:22

## 2025-03-21 RX ADMIN — HYDROMORPHONE HYDROCHLORIDE 1 MG: 1 INJECTION, SOLUTION INTRAMUSCULAR; INTRAVENOUS; SUBCUTANEOUS at 05:35

## 2025-03-21 RX ADMIN — POLYETHYLENE GLYCOL 3350 17 G: 17 POWDER, FOR SOLUTION ORAL at 08:22

## 2025-03-21 RX ADMIN — ASPIRIN 81 MG CHEWABLE TABLET 81 MG: 81 TABLET CHEWABLE at 08:22

## 2025-03-21 RX ADMIN — HYDROMORPHONE HYDROCHLORIDE 1 MG: 1 INJECTION, SOLUTION INTRAMUSCULAR; INTRAVENOUS; SUBCUTANEOUS at 01:11

## 2025-03-21 RX ADMIN — HEPARIN SODIUM 5000 UNITS: 5000 INJECTION INTRAVENOUS; SUBCUTANEOUS at 06:36

## 2025-03-21 RX ADMIN — PANTOPRAZOLE SODIUM 40 MG: 40 INJECTION, POWDER, LYOPHILIZED, FOR SOLUTION INTRAVENOUS at 08:33

## 2025-03-21 RX ADMIN — UMECLIDINIUM 1 PUFF: 62.5 AEROSOL, POWDER ORAL at 08:22

## 2025-03-21 RX ADMIN — SODIUM CHLORIDE 75 ML/HR: 0.9 INJECTION, SOLUTION INTRAVENOUS at 05:36

## 2025-03-21 RX ADMIN — DICYCLOMINE HYDROCHLORIDE 10 MG: 10 CAPSULE ORAL at 11:36

## 2025-03-21 RX ADMIN — OXYCODONE HYDROCHLORIDE 5 MG: 5 TABLET ORAL at 08:29

## 2025-03-21 RX ADMIN — DICYCLOMINE HYDROCHLORIDE 10 MG: 10 CAPSULE ORAL at 06:36

## 2025-03-21 RX ADMIN — LEVOTHYROXINE SODIUM 112 MCG: 112 TABLET ORAL at 06:36

## 2025-03-21 RX ADMIN — FAMOTIDINE 20 MG: 20 TABLET, FILM COATED ORAL at 08:22

## 2025-03-21 NOTE — ASSESSMENT & PLAN NOTE
Lab Results   Component Value Date    HGBA1C 8.2 (H) 03/15/2025     Discharge on home insulin pump

## 2025-03-21 NOTE — ASSESSMENT & PLAN NOTE
Patient with 3 days of dark/burgundy colored loose stools  history of gastroparesis and diverticulosis seen on scope in 2021  CT A/P on admission noted diverticula, diffuse colonic thickening which may represent mild inflammation or colitis  Likely related to colitis which could be infectious, inflammatory or ischemic versus bleeding diverticula  Hemoglobin 11.8, baseline hgb 13-14  FOBT positive  C. difficile negative  stool enteric panel- negative   D/C flagyl   GI following, 3/20 EGD and Colonoscopy normal

## 2025-03-21 NOTE — ASSESSMENT & PLAN NOTE
59-year-old male with history of type 1 diabetes, coronary artery disease who initially presented with significant generalized abdominal pain and inability to tolerate p.o. intake  H/o gastroparesis   Suspect likely secondary to possible colitis as noted on CT scan to have mild colitis presented with 1 day of diarrhea/dark stools  Now tolerating about 25% of meals, all liquids- DC IVF   Stool studies negative, d/c flagyl   GI consult:   3/20 colonoscopy and EGD normal, biopsies taken of stomach to r/o H. Pylori and Celiac disease  Protonix, Pepcid, Maalox  Advance diet as able  Zofran as needed  Aqua K-pad/heat pack for abdomen

## 2025-03-21 NOTE — PLAN OF CARE
Problem: Potential for Falls  Goal: Patient will remain free of falls  Description: INTERVENTIONS:  - Educate patient/family on patient safety including physical limitations  - Instruct patient to call for assistance with activity   - Consult OT/PT to assist with strengthening/mobility   - Keep Call bell within reach  - Keep bed low and locked with side rails adjusted as appropriate  - Keep care items and personal belongings within reach  - Initiate and maintain comfort rounds  - Make Fall Risk Sign visible to staff  - Offer Toileting every  Hours, in advance of need  - Initiate/Maintain alarm  - Obtain necessary fall risk management equipment:   - Apply yellow socks and bracelet for high fall risk patients  - Consider moving patient to room near nurses station  Outcome: Progressing     Problem: PAIN - ADULT  Goal: Verbalizes/displays adequate comfort level or baseline comfort level  Description: Interventions:  - Encourage patient to monitor pain and request assistance  - Assess pain using appropriate pain scale  - Administer analgesics based on type and severity of pain and evaluate response  - Implement non-pharmacological measures as appropriate and evaluate response  - Consider cultural and social influences on pain and pain management  - Notify physician/advanced practitioner if interventions unsuccessful or patient reports new pain  Outcome: Progressing     Problem: INFECTION - ADULT  Goal: Absence or prevention of progression during hospitalization  Description: INTERVENTIONS:  - Assess and monitor for signs and symptoms of infection  - Monitor lab/diagnostic results  - Monitor all insertion sites, i.e. indwelling lines, tubes, and drains  - Monitor endotracheal if appropriate and nasal secretions for changes in amount and color  - [place] appropriate cooling/warming therapies per order  - Administer medications as ordered  - Instruct and encourage patient and family to use good hand hygiene technique  -  Identify and instruct in appropriate isolation precautions for identified infection/condition  Outcome: Progressing  Goal: Absence of fever/infection during neutropenic period  Description: INTERVENTIONS:  - Monitor WBC    Outcome: Progressing     Problem: SAFETY ADULT  Goal: Patient will remain free of falls  Description: INTERVENTIONS:  - Educate patient/family on patient safety including physical limitations  - Instruct patient to call for assistance with activity   - Consult OT/PT to assist with strengthening/mobility   - Keep Call bell within reach  - Keep bed low and locked with side rails adjusted as appropriate  - Keep care items and personal belongings within reach  - Initiate and maintain comfort rounds  - Make Fall Risk Sign visible to staff  - Offer Toileting every  Hours, in advance of need  - Initiate/Maintain alarm  - Obtain necessary fall risk management equipment:   - Apply yellow socks and bracelet for high fall risk patients  - Consider moving patient to room near nurses station  Outcome: Progressing  Goal: Maintain or return to baseline ADL function  Description: INTERVENTIONS:  -  Assess patient's ability to carry out ADLs; assess patient's baseline for ADL function and identify physical deficits which impact ability to perform ADLs (bathing, care of mouth/teeth, toileting, grooming, dressing, etc.)  - Assess/evaluate cause of self-care deficits   - Assess range of motion  - Assess patient's mobility; develop plan if impaired  - Assess patient's need for assistive devices and provide as appropriate  - Encourage maximum independence but intervene and supervise when necessary  - Involve family in performance of ADLs  - Assess for home care needs following discharge   - Consider OT consult to assist with ADL evaluation and planning for discharge  - Provide patient education as appropriate  Outcome: Progressing  Goal: Maintains/Returns to pre admission functional level  Description:  INTERVENTIONS:  - Perform AM-PAC 6 Click Basic Mobility/ Daily Activity assessment daily.  - Set and communicate daily mobility goal to care team and patient/family/caregiver.   - Collaborate with rehabilitation services on mobility goals if consulted  - Perform Range of Motion  times a day.  - Reposition patient every  hours.  - Dangle patient  times a day  - Stand patient  times a day  - Ambulate patient  times a day  - Out of bed to chair  times a day   - Out of bed for meals  times a day  - Out of bed for toileting  - Record patient progress and toleration of activity level   Outcome: Progressing     Problem: DISCHARGE PLANNING  Goal: Discharge to home or other facility with appropriate resources  Description: INTERVENTIONS:  - Identify barriers to discharge w/patient and caregiver  - Arrange for needed discharge resources and transportation as appropriate  - Identify discharge learning needs (meds, wound care, etc.)  - Arrange for interpretive services to assist at discharge as needed  - Refer to Case Management Department for coordinating discharge planning if the patient needs post-hospital services based on physician/advanced practitioner order or complex needs related to functional status, cognitive ability, or social support system  Outcome: Progressing     Problem: Knowledge Deficit  Goal: Patient/family/caregiver demonstrates understanding of disease process, treatment plan, medications, and discharge instructions  Description: Complete learning assessment and assess knowledge base.  Interventions:  - Provide teaching at level of understanding  - Provide teaching via preferred learning methods  Outcome: Progressing     Problem: Nutrition/Hydration-ADULT  Goal: Nutrient/Hydration intake appropriate for improving, restoring or maintaining nutritional needs  Description: Monitor and assess patient's nutrition/hydration status for malnutrition. Collaborate with interdisciplinary team and initiate plan and  interventions as ordered.  Monitor patient's weight and dietary intake as ordered or per policy. Utilize nutrition screening tool and intervene as necessary. Determine patient's food preferences and provide high-protein, high-caloric foods as appropriate.     INTERVENTIONS:  - Monitor oral intake, urinary output, labs, and treatment plans  - Assess nutrition and hydration status and recommend course of action  - Evaluate amount of meals eaten  - Assist patient with eating if necessary   - Allow adequate time for meals  - Recommend/ encourage appropriate diets, oral nutritional supplements, and vitamin/mineral supplements  - Order, calculate, and assess calorie counts as needed  - Recommend, monitor, and adjust tube feedings and TPN/PPN based on assessed needs  - Assess need for intravenous fluids  - Provide specific nutrition/hydration education as appropriate  - Include patient/family/caregiver in decisions related to nutrition  Outcome: Progressing

## 2025-03-21 NOTE — PLAN OF CARE
Problem: Potential for Falls  Goal: Patient will remain free of falls  Description: INTERVENTIONS:  - Educate patient/family on patient safety including physical limitations  - Instruct patient to call for assistance with activity   - Consult OT/PT to assist with strengthening/mobility   - Keep Call bell within reach  - Keep bed low and locked with side rails adjusted as appropriate  - Keep care items and personal belongings within reach  - Initiate and maintain comfort rounds  - Make Fall Risk Sign visible to staff  - Offer Toileting every  Hours, in advance of need  - Initiate/Maintain alarm  - Obtain necessary fall risk management equipment:   - Apply yellow socks and bracelet for high fall risk patients  - Consider moving patient to room near nurses station  3/21/2025 0127 by Iqra La RN  Outcome: Progressing  3/21/2025 0127 by Iqra La RN  Outcome: Progressing     Problem: PAIN - ADULT  Goal: Verbalizes/displays adequate comfort level or baseline comfort level  Description: Interventions:  - Encourage patient to monitor pain and request assistance  - Assess pain using appropriate pain scale  - Administer analgesics based on type and severity of pain and evaluate response  - Implement non-pharmacological measures as appropriate and evaluate response  - Consider cultural and social influences on pain and pain management  - Notify physician/advanced practitioner if interventions unsuccessful or patient reports new pain  3/21/2025 0127 by Iqra La RN  Outcome: Progressing  3/21/2025 0127 by Iqra La RN  Outcome: Progressing     Problem: INFECTION - ADULT  Goal: Absence or prevention of progression during hospitalization  Description: INTERVENTIONS:  - Assess and monitor for signs and symptoms of infection  - Monitor lab/diagnostic results  - Monitor all insertion sites, i.e. indwelling lines, tubes, and drains  - Monitor endotracheal if appropriate and nasal secretions for changes in amount and  color  - [place] appropriate cooling/warming therapies per order  - Administer medications as ordered  - Instruct and encourage patient and family to use good hand hygiene technique  - Identify and instruct in appropriate isolation precautions for identified infection/condition  3/21/2025 0127 by Iqra La RN  Outcome: Progressing  3/21/2025 0127 by Iqra La RN  Outcome: Progressing  Goal: Absence of fever/infection during neutropenic period  Description: INTERVENTIONS:  - Monitor WBC    3/21/2025 0127 by Iqra La RN  Outcome: Progressing  3/21/2025 0127 by Iqra La RN  Outcome: Progressing     Problem: SAFETY ADULT  Goal: Patient will remain free of falls  Description: INTERVENTIONS:  - Educate patient/family on patient safety including physical limitations  - Instruct patient to call for assistance with activity   - Consult OT/PT to assist with strengthening/mobility   - Keep Call bell within reach  - Keep bed low and locked with side rails adjusted as appropriate  - Keep care items and personal belongings within reach  - Initiate and maintain comfort rounds  - Make Fall Risk Sign visible to staff  - Offer Toileting every  Hours, in advance of need  - Initiate/Maintain alarm  - Obtain necessary fall risk management equipment:   - Apply yellow socks and bracelet for high fall risk patients  - Consider moving patient to room near nurses station  3/21/2025 0127 by Iqra La RN  Outcome: Progressing  3/21/2025 0127 by Iqra La RN  Outcome: Progressing  Goal: Maintain or return to baseline ADL function  Description: INTERVENTIONS:  -  Assess patient's ability to carry out ADLs; assess patient's baseline for ADL function and identify physical deficits which impact ability to perform ADLs (bathing, care of mouth/teeth, toileting, grooming, dressing, etc.)  - Assess/evaluate cause of self-care deficits   - Assess range of motion  - Assess patient's mobility; develop plan if impaired  - Assess  patient's need for assistive devices and provide as appropriate  - Encourage maximum independence but intervene and supervise when necessary  - Involve family in performance of ADLs  - Assess for home care needs following discharge   - Consider OT consult to assist with ADL evaluation and planning for discharge  - Provide patient education as appropriate  3/21/2025 0127 by Iqra La RN  Outcome: Progressing  3/21/2025 0127 by Iqra La RN  Outcome: Progressing  Goal: Maintains/Returns to pre admission functional level  Description: INTERVENTIONS:  - Perform AM-PAC 6 Click Basic Mobility/ Daily Activity assessment daily.  - Set and communicate daily mobility goal to care team and patient/family/caregiver.   - Collaborate with rehabilitation services on mobility goals if consulted  - Perform Range of Motion  times a day.  - Reposition patient every  hours.  - Dangle patient  times a day  - Stand patient  times a day  - Ambulate patient  times a day  - Out of bed to chair  times a day   - Out of bed for meals  times a day  - Out of bed for toileting  - Record patient progress and toleration of activity level   3/21/2025 0127 by Iqra La RN  Outcome: Progressing  3/21/2025 0127 by Iqra La RN  Outcome: Progressing     Problem: DISCHARGE PLANNING  Goal: Discharge to home or other facility with appropriate resources  Description: INTERVENTIONS:  - Identify barriers to discharge w/patient and caregiver  - Arrange for needed discharge resources and transportation as appropriate  - Identify discharge learning needs (meds, wound care, etc.)  - Arrange for interpretive services to assist at discharge as needed  - Refer to Case Management Department for coordinating discharge planning if the patient needs post-hospital services based on physician/advanced practitioner order or complex needs related to functional status, cognitive ability, or social support system  3/21/2025 0127 by Iqra La RN  Outcome:  Progressing  3/21/2025 0127 by Iqra La RN  Outcome: Progressing     Problem: Knowledge Deficit  Goal: Patient/family/caregiver demonstrates understanding of disease process, treatment plan, medications, and discharge instructions  Description: Complete learning assessment and assess knowledge base.  Interventions:  - Provide teaching at level of understanding  - Provide teaching via preferred learning methods  3/21/2025 0127 by Iqra La RN  Outcome: Progressing  3/21/2025 0127 by Iqra La RN  Outcome: Progressing     Problem: Nutrition/Hydration-ADULT  Goal: Nutrient/Hydration intake appropriate for improving, restoring or maintaining nutritional needs  Description: Monitor and assess patient's nutrition/hydration status for malnutrition. Collaborate with interdisciplinary team and initiate plan and interventions as ordered.  Monitor patient's weight and dietary intake as ordered or per policy. Utilize nutrition screening tool and intervene as necessary. Determine patient's food preferences and provide high-protein, high-caloric foods as appropriate.     INTERVENTIONS:  - Monitor oral intake, urinary output, labs, and treatment plans  - Assess nutrition and hydration status and recommend course of action  - Evaluate amount of meals eaten  - Assist patient with eating if necessary   - Allow adequate time for meals  - Recommend/ encourage appropriate diets, oral nutritional supplements, and vitamin/mineral supplements  - Order, calculate, and assess calorie counts as needed  - Recommend, monitor, and adjust tube feedings and TPN/PPN based on assessed needs  - Assess need for intravenous fluids  - Provide specific nutrition/hydration education as appropriate  - Include patient/family/caregiver in decisions related to nutrition  3/21/2025 0127 by Iqra La RN  Outcome: Progressing  3/21/2025 0127 by Iqra La RN  Outcome: Progressing

## 2025-03-21 NOTE — DISCHARGE INSTR - AVS FIRST PAGE
Dear Brian Butterfield,     It was our pleasure to care for you here at [facility].  It is our hope that we were always able to exceed the expected standards for your care during your stay.  You were hospitalized due to intractable abdominal pain.  You were cared for on the MedSurg floor by Shannon Joyce PA-C under the service of Kenna Sheridan,  with the [facility] who covers for your primary care physician (PCP), No primary care provider on file., while you were hospitalized.  If you have any questions or concerns related to this hospitalization, you may contact us at .  For follow up as well as any medication refills, we recommend that you follow up with your primary care physician.  A registered nurse will reach out to you by phone within a few days after your discharge to answer any additional questions that you may have after going home.  However, at this time we provide for you here, the most important instructions / recommendations at discharge:     Notable Medication Adjustments -   Start taking Maalox 15 mL by mouth every 4 hours as needed for indigestion or heartburn  Start taking Bentyl 10 mg by mouth 4 times a day before meals and at bedtime for abdominal cramping  Start taking Protonix 40 mg by mouth daily for indigestion/reflux  Take Zofran 4 mg every 8 hours as needed for nausea or vomiting  Testing Required after Discharge -   Follow-up EGD biopsies with GI team  ** Please contact your PCP to request testing orders for any of the testing recommended here **  Important follow up information -   Please follow-up with the gastroenterology team closely as an outpatient, they will call to schedule this appointment for you  Please follow-up with your primary care provider in 1 week for continuity of care  Please continue to eat foods as tolerated and hydrate yourself  Other Instructions -   Please return to the emergency  department if you develop new or worsening, persistent abdominal pain, abdominal distention/bloating, blood in your stool, fainting, chest pain or shortness of breath.  Please review this entire after visit summary as additional general instructions including medication list, appointments, activity, diet, any pertinent wound care, and other additional recommendations from your care team that may be provided for you.      Sincerely,     Shannon Joyce PA-C

## 2025-03-21 NOTE — PLAN OF CARE
Problem: Potential for Falls  Goal: Patient will remain free of falls  Description: INTERVENTIONS:  - Educate patient/family on patient safety including physical limitations  - Instruct patient to call for assistance with activity   - Consult OT/PT to assist with strengthening/mobility   - Keep Call bell within reach  - Keep bed low and locked with side rails adjusted as appropriate  - Keep care items and personal belongings within reach  - Initiate and maintain comfort rounds  - Make Fall Risk Sign visible to staff  - Offer Toileting every  Hours, in advance of need  - Initiate/Maintain alarm  - Obtain necessary fall risk management equipment:   - Apply yellow socks and bracelet for high fall risk patients  - Consider moving patient to room near nurses station  Outcome: Progressing     Problem: PAIN - ADULT  Goal: Verbalizes/displays adequate comfort level or baseline comfort level  Description: Interventions:  - Encourage patient to monitor pain and request assistance  - Assess pain using appropriate pain scale  - Administer analgesics based on type and severity of pain and evaluate response  - Implement non-pharmacological measures as appropriate and evaluate response  - Consider cultural and social influences on pain and pain management  - Notify physician/advanced practitioner if interventions unsuccessful or patient reports new pain  Outcome: Progressing     Problem: INFECTION - ADULT  Goal: Absence or prevention of progression during hospitalization  Description: INTERVENTIONS:  - Assess and monitor for signs and symptoms of infection  - Monitor lab/diagnostic results  - Monitor all insertion sites, i.e. indwelling lines, tubes, and drains  - Monitor endotracheal if appropriate and nasal secretions for changes in amount and color  - [place] appropriate cooling/warming therapies per order  - Administer medications as ordered  - Instruct and encourage patient and family to use good hand hygiene technique  -  Identify and instruct in appropriate isolation precautions for identified infection/condition  Outcome: Progressing  Goal: Absence of fever/infection during neutropenic period  Description: INTERVENTIONS:  - Monitor WBC    Outcome: Progressing     Problem: SAFETY ADULT  Goal: Patient will remain free of falls  Description: INTERVENTIONS:  - Educate patient/family on patient safety including physical limitations  - Instruct patient to call for assistance with activity   - Consult OT/PT to assist with strengthening/mobility   - Keep Call bell within reach  - Keep bed low and locked with side rails adjusted as appropriate  - Keep care items and personal belongings within reach  - Initiate and maintain comfort rounds  - Make Fall Risk Sign visible to staff  - Offer Toileting every  Hours, in advance of need  - Initiate/Maintain alarm  - Obtain necessary fall risk management equipment:  - Apply yellow socks and bracelet for high fall risk patients  - Consider moving patient to room near nurses station  Outcome: Progressing  Goal: Maintain or return to baseline ADL function  Description: INTERVENTIONS:  -  Assess patient's ability to carry out ADLs; assess patient's baseline for ADL function and identify physical deficits which impact ability to perform ADLs (bathing, care of mouth/teeth, toileting, grooming, dressing, etc.)  - Assess/evaluate cause of self-care deficits   - Assess range of motion  - Assess patient's mobility; develop plan if impaired  - Assess patient's need for assistive devices and provide as appropriate  - Encourage maximum independence but intervene and supervise when necessary  - Involve family in performance of ADLs  - Assess for home care needs following discharge   - Consider OT consult to assist with ADL evaluation and planning for discharge  - Provide patient education as appropriate  Outcome: Progressing  Goal: Maintains/Returns to pre admission functional level  Description: INTERVENTIONS:  -  Perform AM-PAC 6 Click Basic Mobility/ Daily Activity assessment daily.  - Set and communicate daily mobility goal to care team and patient/family/caregiver.   - Collaborate with rehabilitation services on mobility goals if consulted  - Perform Range of Motion  times a day.  - Reposition patient every  hours.  - Dangle patient  times a day  - Stand patient  times a day  - Ambulate patient times a day  - Out of bed to chair  times a day   - Out of bed for meals  times a day  - Out of bed for toileting  - Record patient progress and toleration of activity level   Outcome: Progressing     Problem: DISCHARGE PLANNING  Goal: Discharge to home or other facility with appropriate resources  Description: INTERVENTIONS:  - Identify barriers to discharge w/patient and caregiver  - Arrange for needed discharge resources and transportation as appropriate  - Identify discharge learning needs (meds, wound care, etc.)  - Arrange for interpretive services to assist at discharge as needed  - Refer to Case Management Department for coordinating discharge planning if the patient needs post-hospital services based on physician/advanced practitioner order or complex needs related to functional status, cognitive ability, or social support system  Outcome: Progressing     Problem: Knowledge Deficit  Goal: Patient/family/caregiver demonstrates understanding of disease process, treatment plan, medications, and discharge instructions  Description: Complete learning assessment and assess knowledge base.  Interventions:  - Provide teaching at level of understanding  - Provide teaching via preferred learning methods  Outcome: Progressing     Problem: Nutrition/Hydration-ADULT  Goal: Nutrient/Hydration intake appropriate for improving, restoring or maintaining nutritional needs  Description: Monitor and assess patient's nutrition/hydration status for malnutrition. Collaborate with interdisciplinary team and initiate plan and interventions as  ordered.  Monitor patient's weight and dietary intake as ordered or per policy. Utilize nutrition screening tool and intervene as necessary. Determine patient's food preferences and provide high-protein, high-caloric foods as appropriate.     INTERVENTIONS:  - Monitor oral intake, urinary output, labs, and treatment plans  - Assess nutrition and hydration status and recommend course of action  - Evaluate amount of meals eaten  - Assist patient with eating if necessary   - Allow adequate time for meals  - Recommend/ encourage appropriate diets, oral nutritional supplements, and vitamin/mineral supplements  - Order, calculate, and assess calorie counts as needed  - Recommend, monitor, and adjust tube feedings and TPN/PPN based on assessed needs  - Assess need for intravenous fluids  - Provide specific nutrition/hydration education as appropriate  - Include patient/family/caregiver in decisions related to nutrition  Outcome: Progressing

## 2025-03-21 NOTE — DISCHARGE SUMMARY
Discharge Summary - Hospitalist   Name: Brian Butterfield 59 y.o. male I MRN: [id number]  Unit/Bed#: W -01 I Date of Admission: 3/15/2025   Date of Service: 3/21/2025 I Hospital Day: 5     Assessment & Plan  Intractable abdominal pain  59-year-old male with history of type 1 diabetes, coronary artery disease who initially presented with significant generalized abdominal pain and inability to tolerate p.o. intake  H/o gastroparesis   Suspect likely secondary to possible colitis as noted on CT scan to have mild colitis presented with 1 day of diarrhea/dark stools  Now tolerating about 25% of meals, all liquids- DC IVF   Stool studies negative, d/c flagyl   GI consult:   3/20 colonoscopy and EGD normal, biopsies taken of stomach to r/o H. Pylori and Celiac disease  Protonix, Pepcid, Maalox  Advance diet as able  Zofran as needed  Aqua K-pad/heat pack for abdomen  Dark stools  Patient with 3 days of dark/burgundy colored loose stools  history of gastroparesis and diverticulosis seen on scope in 2021  CT A/P on admission noted diverticula, diffuse colonic thickening which may represent mild inflammation or colitis  Likely related to colitis which could be infectious, inflammatory or ischemic versus bleeding diverticula  Hemoglobin 11.8, baseline hgb 13-14  FOBT positive  C. difficile negative  stool enteric panel- negative   D/C flagyl   GI following, 3/20 EGD and Colonoscopy normal   COPD (chronic obstructive pulmonary disease) (HCC)  Not in acute exacerbation  Continue home inhalers  CAD S/P percutaneous coronary angioplasty  History of coronary artery disease status post PCI 2020 and 2021  Continue aspirin, Plavix   Diabetic polyneuropathy associated with type 1 diabetes mellitus (HCC)    Lab Results   Component Value Date    HGBA1C 8.2 (H) 03/15/2025     Discharge on home insulin pump  Other chest pain  3/18 with complaints of CP overnight, center of chest  Likely GI in etiology as patient was vomiting before  and after  3/20 continues to remain CP free   Troponins 8 -> 7  EKG unchanged from prior, no TWI or ST segment changes  Discontinue telemetry  Stat repeat EKG for recurrent chest pain     Medical Problems       Resolved Problems  Date Reviewed: 5/4/2021   None       Discharging Physician / Practitioner: Shannon Joyce PA-C  PCP: No primary care provider on file.  Admission Date:   Admission Orders (From admission, onward)       Ordered        03/16/25 0945  INPATIENT ADMISSION  Once            03/15/25 1903  Place in Observation  Once                          Discharge Date: 03/21/25    Consultations During Hospital Stay:  Gastroenterology    Procedures Performed:   Colonoscopy and EGD    Significant Findings / Test Results:   C. difficile panel negative  Stool enteric panel negative  iFOBT positive for occult blood  CXR: No acute cardiopulmonary disease  CTA dissection study: No evidence of aneurysm or dissection.  Arthrosclerotic changes in the aorta.  Small hiatus hernia.  Apparent thickening of the colon which may be related to underdistention versus mild inflammation/colitis.  There is slight haziness of the pericolonic fat.  This should be correlated with any GI symptomology.  CBC with low hemoglobin of 11.9  Colonoscopy: The entire colon appeared normal  EGD: The esophagus, stomach and duodenum appeared normal  Lipase 18    Incidental Findings:   None    Test Results Pending at Discharge (will require follow up):   Tissue exam from colonoscopy     Outpatient Tests Requested:  Repeat colonoscopy in 2 years    Complications: None    Reason for Admission: Intractable abdominal pain    Hospital Course:   Brian Butterfield is a 59 y.o. male patient who originally presented to the hospital on 3/15/2025 due to intractable abdominal pain with, nausea, vomiting and burgundy stools.  CT imaging for colitis, patient was started on empiric antibiotics.  Stool studies were sent out and returned as negative,  antibiotics were discontinued.  Patient persistently had burgundy colored stools and abdominal pain with notable gradual drop in hemoglobin therefore GI was consulted for GI bleed evaluation.  Patient underwent endoscopy and colonoscopy which were both unremarkable.  Samples were taken to rule out H. pylori and celiac disease, the patient will follow-up with the outpatient GI office in regards to these biopsies.  Patient did have an episode of chest pain which was likely GI in nature as telemetry monitoring and EKGs were unremarkable.  Over the course of his admission here he had general improvement with PPIs, H2 blockers, other analgesics for abdominal cramping and indigestion.  He is now able to tolerate a diet.  Given his overall clinical improvement and stable labs and vital signs he is medically appropriate for discharge at this time.  Discharge instructions and return precautions were discussed with the patient in detail.    Please see above list of diagnoses and related plan for additional information.     Condition at Discharge: good    Discharge Day Visit / Exam:   Subjective: Patient doing well, notes mild left upper quadrant pain 5/10.  Denies any nausea or vomiting, fever or chills, diarrhea.  Vitals: Blood Pressure: 122/58 (03/21/25 0700)  Pulse: 63 (03/20/25 2334)  Temperature: 98.9 °F (37.2 °C) (03/21/25 0700)  Temp Source: Oral (03/21/25 0700)  Respirations: 16 (03/21/25 0700)  Weight - Scale: 74.3 kg (163 lb 12.8 oz) (03/15/25 1639)  SpO2: 96 % (03/20/25 2334)  Physical Exam  Vitals reviewed.   Constitutional:       General: He is not in acute distress.     Appearance: Normal appearance. He is not ill-appearing.   HENT:      Mouth/Throat:      Mouth: Mucous membranes are moist.   Cardiovascular:      Rate and Rhythm: Normal rate and regular rhythm.      Heart sounds: Normal heart sounds.   Pulmonary:      Effort: Pulmonary effort is normal.      Breath sounds: Normal breath sounds.   Abdominal:       General: Abdomen is flat. Bowel sounds are normal.      Palpations: Abdomen is soft.      Tenderness: There is abdominal tenderness (Left upper quadrant). There is no guarding or rebound.   Musculoskeletal:      Right lower leg: No edema.      Left lower leg: No edema.   Skin:     General: Skin is warm and dry.   Neurological:      Mental Status: He is alert and oriented to person, place, and time.          Discussion with Family: Patient declined call to .     Discharge instructions/Information to patient and family:   See after visit summary for information provided to patient and family.      Provisions for Follow-Up Care:  See after visit summary for information related to follow-up care and any pertinent home health orders.      Mobility at time of Discharge:   Basic Mobility Inpatient Raw Score: 24  JH-HLM Goal: 8: Walk 250 feet or more  JH-HLM Achieved: 7: Walk 25 feet or more  HLM Goal achieved. Continue to encourage appropriate mobility.     Disposition:   Home    Planned Readmission: no    Discharge Medications:  See after visit summary for reconciled discharge medications provided to patient and/or family.      Administrative Statements   Discharge Statement:  I have spent a total time of 65 minutes in caring for this patient on the day of the visit/encounter. >30 minutes of time was spent on: Diagnostic results, Prognosis, Risks and benefits of tx options, Instructions for management, Documenting in the medical record, Reviewing / ordering tests, medicine, procedures  , and Communicating with other healthcare professionals .    **Please Note: This note may have been constructed using a voice recognition system**

## 2025-03-26 PROCEDURE — 88305 TISSUE EXAM BY PATHOLOGIST: CPT | Performed by: PATHOLOGY

## 2025-03-26 NOTE — QUICK NOTE
Addendum to Discharge Summary on 3/21/25:   Patient with abdominal pain secondary to gastroparesis, complicated by acute viral colitis. Treated with IV antibiotics, endoscopy and colonoscopy, degrading  diet and pain management.

## 2025-03-27 ENCOUNTER — RESULTS FOLLOW-UP (OUTPATIENT)
Age: 60
End: 2025-03-27

## 2025-04-16 PROBLEM — R19.7 DIARRHEA OF PRESUMED INFECTIOUS ORIGIN: Status: RESOLVED | Noted: 2025-03-17 | Resolved: 2025-04-16
